# Patient Record
Sex: FEMALE | Race: BLACK OR AFRICAN AMERICAN | NOT HISPANIC OR LATINO | Employment: UNEMPLOYED | URBAN - METROPOLITAN AREA
[De-identification: names, ages, dates, MRNs, and addresses within clinical notes are randomized per-mention and may not be internally consistent; named-entity substitution may affect disease eponyms.]

---

## 2018-07-17 ENCOUNTER — HISTORICAL (OUTPATIENT)
Dept: INTERNAL MEDICINE | Facility: CLINIC | Age: 63
End: 2018-07-17

## 2018-07-17 LAB
ABS NEUT (OLG): 1.2 X10(3)/MCL (ref 2.1–9.2)
ALBUMIN SERPL-MCNC: 2.3 GM/DL (ref 3.4–5)
ALBUMIN/GLOB SERPL: 0 RATIO (ref 1–2)
ALP SERPL-CCNC: 233 UNIT/L (ref 45–117)
ALT SERPL-CCNC: 61 UNIT/L (ref 12–78)
APPEARANCE, UA: ABNORMAL
AST SERPL-CCNC: 78 UNIT/L (ref 15–37)
BACTERIA #/AREA URNS AUTO: ABNORMAL /[HPF]
BASOPHILS # BLD AUTO: 0.03 X10(3)/MCL
BASOPHILS NFR BLD AUTO: 1 %
BILIRUB SERPL-MCNC: 1.2 MG/DL (ref 0.2–1)
BILIRUB UR QL STRIP: 0.5 MG/DL
BILIRUBIN DIRECT+TOT PNL SERPL-MCNC: 0.6 MG/DL
BILIRUBIN DIRECT+TOT PNL SERPL-MCNC: 0.6 MG/DL
BUN SERPL-MCNC: 10 MG/DL (ref 7–18)
BUN SERPL-MCNC: 10 MG/DL (ref 7–18)
CALCIUM SERPL-MCNC: 7.8 MG/DL (ref 8.5–10.1)
CALCIUM SERPL-MCNC: 7.8 MG/DL (ref 8.5–10.1)
CHLORIDE SERPL-SCNC: 109 MMOL/L (ref 98–107)
CHLORIDE SERPL-SCNC: 109 MMOL/L (ref 98–107)
CHOLEST SERPL-MCNC: 122 MG/DL
CHOLEST/HDLC SERPL: 7.2 {RATIO} (ref 0–4.4)
CO2 SERPL-SCNC: 25 MMOL/L (ref 21–32)
CO2 SERPL-SCNC: 25 MMOL/L (ref 21–32)
COLOR UR: ABNORMAL
CREAT SERPL-MCNC: 0.7 MG/DL (ref 0.6–1.3)
CREAT SERPL-MCNC: 0.7 MG/DL (ref 0.6–1.3)
CREAT UR-MCNC: 286 MG/DL
CREAT/UREA NIT SERPL: 14
EOSINOPHIL # BLD AUTO: 0.17 10*3/UL
EOSINOPHIL NFR BLD AUTO: 6 %
ERYTHROCYTE [DISTWIDTH] IN BLOOD BY AUTOMATED COUNT: 18.3 % (ref 11.5–14.5)
EST. AVERAGE GLUCOSE BLD GHB EST-MCNC: 74 MG/DL
GLOBULIN SER-MCNC: 5.2 GM/ML (ref 2.3–3.5)
GLUCOSE (UA): NORMAL
GLUCOSE SERPL-MCNC: 82 MG/DL (ref 74–106)
GLUCOSE SERPL-MCNC: 82 MG/DL (ref 74–106)
HAV IGM SERPL QL IA: NONREACTIVE
HBA1C MFR BLD: 4.2 % (ref 4.2–6.3)
HBV CORE IGM SERPL QL IA: NONREACTIVE
HBV SURFACE AG SERPL QL IA: NEGATIVE
HCT VFR BLD AUTO: 33.4 % (ref 35–46)
HCV AB SERPL QL IA: REACTIVE
HDLC SERPL-MCNC: 17 MG/DL
HGB BLD-MCNC: 11.4 GM/DL (ref 12–16)
HGB UR QL STRIP: ABNORMAL MG/DL
HYALINE CASTS #/AREA URNS LPF: ABNORMAL /[LPF]
IMM GRANULOCYTES # BLD AUTO: 0.01 10*3/UL
IMM GRANULOCYTES NFR BLD AUTO: 0 %
KETONES UR QL STRIP: ABNORMAL
LDLC SERPL CALC-MCNC: 86 MG/DL (ref 0–130)
LEUKOCYTE ESTERASE UR QL STRIP: 25 LEU/UL
LYMPHOCYTES # BLD AUTO: 1.23 X10(3)/MCL
LYMPHOCYTES NFR BLD AUTO: 42 % (ref 13–40)
MCH RBC QN AUTO: 30.7 PG (ref 26–34)
MCHC RBC AUTO-ENTMCNC: 34.1 GM/DL (ref 31–37)
MCV RBC AUTO: 90 FL (ref 80–100)
MICROALBUMIN UR-MCNC: 83 MG/L (ref 0–19)
MICROALBUMIN/CREAT RATIO PNL UR: 29 MCG/MG CR (ref 0–29)
MONOCYTES # BLD AUTO: 0.3 X10(3)/MCL
MONOCYTES NFR BLD AUTO: 10 % (ref 4–12)
MUCOUS THREADS URNS QL MICRO: SLIGHT
NEUTROPHILS # BLD AUTO: 1.2 X10(3)/MCL
NEUTROPHILS NFR BLD AUTO: 41 X10(3)/MCL
NITRITE UR QL STRIP: NEGATIVE
PH UR STRIP: 7 [PH] (ref 4.5–8)
PLATELET # BLD AUTO: 47 X10(3)/MCL (ref 130–400)
PMV BLD AUTO: 10.7 FL (ref 7.4–10.4)
POTASSIUM SERPL-SCNC: 4 MMOL/L (ref 3.5–5.1)
POTASSIUM SERPL-SCNC: 4 MMOL/L (ref 3.5–5.1)
PROT SERPL-MCNC: 7.5 GM/DL (ref 6.4–8.2)
PROT UR QL STRIP: 70 MG/DL
RBC # BLD AUTO: 3.71 X10(6)/MCL (ref 4–5.2)
RBC #/AREA URNS AUTO: >=100 /[HPF]
SODIUM SERPL-SCNC: 141 MMOL/L (ref 136–145)
SODIUM SERPL-SCNC: 141 MMOL/L (ref 136–145)
SP GR UR STRIP: 1.02 (ref 1–1.03)
SQUAMOUS #/AREA URNS LPF: ABNORMAL /[LPF]
T4 FREE SERPL-MCNC: 0.9 NG/DL (ref 0.76–1.46)
TRIGL SERPL-MCNC: 94 MG/DL
TSH SERPL-ACNC: 5.84 MIU/L (ref 0.36–3.74)
UROBILINOGEN UR STRIP-ACNC: 12 MG/DL
VLDLC SERPL CALC-MCNC: 19 MG/DL
WBC # SPEC AUTO: 2.9 X10(3)/MCL (ref 4.5–11)
WBC #/AREA URNS AUTO: ABNORMAL /HPF

## 2018-07-26 ENCOUNTER — HISTORICAL (OUTPATIENT)
Dept: ADMINISTRATIVE | Facility: HOSPITAL | Age: 63
End: 2018-07-26

## 2018-07-26 LAB
APTT PPP: 34 SECOND(S) (ref 23.3–37)
FERRITIN SERPL-MCNC: 468.4 NG/ML (ref 8–388)
GGT SERPL-CCNC: 127 UNIT/L (ref 5–85)
INR PPP: 1.38 (ref 0.9–1.2)
LDH SERPL-CCNC: 236 UNIT/L (ref 84–246)
PROT SERPL-MCNC: 7.7 GM/DL
PROTHROMBIN TIME: 16.1 SECOND(S) (ref 11.9–14.4)

## 2018-08-06 ENCOUNTER — HISTORICAL (OUTPATIENT)
Dept: RADIOLOGY | Facility: HOSPITAL | Age: 63
End: 2018-08-06

## 2018-09-26 ENCOUNTER — HISTORICAL (OUTPATIENT)
Dept: ADMINISTRATIVE | Facility: HOSPITAL | Age: 63
End: 2018-09-26

## 2018-10-15 ENCOUNTER — HISTORICAL (OUTPATIENT)
Dept: LAB | Facility: HOSPITAL | Age: 63
End: 2018-10-15

## 2018-10-25 ENCOUNTER — HISTORICAL (OUTPATIENT)
Dept: LAB | Facility: HOSPITAL | Age: 63
End: 2018-10-25

## 2019-02-11 ENCOUNTER — HISTORICAL (OUTPATIENT)
Dept: RADIOLOGY | Facility: HOSPITAL | Age: 64
End: 2019-02-11

## 2019-03-06 ENCOUNTER — HISTORICAL (OUTPATIENT)
Dept: RADIOLOGY | Facility: HOSPITAL | Age: 64
End: 2019-03-06

## 2019-03-21 ENCOUNTER — HISTORICAL (OUTPATIENT)
Dept: RADIOLOGY | Facility: HOSPITAL | Age: 64
End: 2019-03-21

## 2019-03-26 ENCOUNTER — TELEPHONE (OUTPATIENT)
Dept: TRANSPLANT | Facility: CLINIC | Age: 64
End: 2019-03-26

## 2019-03-26 NOTE — TELEPHONE ENCOUNTER
----- Message from Kahlil Cassidy sent at 3/26/2019  5:53 PM CDT -----    We have the pt records and they are now pending review by the referral nurse.  By:Kahlil Cassidy

## 2019-04-01 ENCOUNTER — DOCUMENTATION ONLY (OUTPATIENT)
Dept: TRANSPLANT | Facility: CLINIC | Age: 64
End: 2019-04-01

## 2019-04-01 NOTE — NURSING
Pt records reviewed.  Pt will be referred to [facility] due to HEP C. Pt with MELD of  11. Too early for Txp. Will see HCV clinic for treatment recommendations.   Initial referral received from Ivett Lara NP.   Referral letter sent to provider and patient.

## 2019-04-01 NOTE — LETTER
April 1, 2019    Paula Vilchis  [address] [address]      Dear Paula Vilchis:    Your doctor has referred you to the [facility]. We are sending this letter to remind you to make an appointment with us to complete the referral process.     Please call us at [phone] to schedule an appointment. We look forward to seeing you soon.     If you received a call and have been scheduled, please disregard this letter.       Sincerely,        [facility]   [address] [place] [address]  [phone]

## 2019-04-01 NOTE — LETTER
April 1, 2019    Paula Vilchis  [address] [facility] [address]      Dear Paula Vilchis:    Your doctor has referred you to the [facility]. We are sending this letter to remind you to make an appointment with us to complete the referral process.     Please call us at [phone] to schedule an appointment. We look forward to seeing you soon.     If you received a call and have been scheduled, please disregard this letter.       Sincerely,        [facility]   [address] [place] [id number]  [phone]

## 2019-04-01 NOTE — LETTER
April 1, 2019    Ivett Lara, JOVANA  [address] [address]      Dear Dr. Lara    Patient: Paula Vilchis   MR Number: [id number]   YOB: 1955     Thank you for the referral of Paula Vilchis to the [facility] program. An initial appointment will be scheduled for your patient with one of our Hepatologists.      Thank you again for your trust in our program.  If there is anything we can do for you or your staff, please feel free to contact us.        Sincerely,        [facility]  [address] [place] [id number]  [phone]

## 2019-04-03 ENCOUNTER — TELEPHONE (OUTPATIENT)
Dept: HEPATOLOGY | Facility: CLINIC | Age: 64
End: 2019-04-03

## 2019-07-30 ENCOUNTER — TELEPHONE (OUTPATIENT)
Dept: HEPATOLOGY | Facility: CLINIC | Age: 64
End: 2019-07-30

## 2019-07-30 NOTE — TELEPHONE ENCOUNTER
----- Message from Kahlil Cassidy sent at 7/26/2019  2:43 PM CDT -----    Please call pt to [facility] 1st appt.

## 2019-11-25 ENCOUNTER — HISTORICAL (OUTPATIENT)
Dept: RADIOLOGY | Facility: HOSPITAL | Age: 64
End: 2019-11-25

## 2020-02-07 ENCOUNTER — HISTORICAL (OUTPATIENT)
Dept: INTERNAL MEDICINE | Facility: CLINIC | Age: 65
End: 2020-02-07

## 2020-02-17 ENCOUNTER — HISTORICAL (OUTPATIENT)
Dept: RADIOLOGY | Facility: HOSPITAL | Age: 65
End: 2020-02-17

## 2020-04-15 ENCOUNTER — HISTORICAL (OUTPATIENT)
Dept: LAB | Facility: HOSPITAL | Age: 65
End: 2020-04-15

## 2020-05-12 ENCOUNTER — HISTORICAL (OUTPATIENT)
Dept: RADIOLOGY | Facility: HOSPITAL | Age: 65
End: 2020-05-12

## 2020-05-21 ENCOUNTER — HISTORICAL (OUTPATIENT)
Dept: INTERNAL MEDICINE | Facility: CLINIC | Age: 65
End: 2020-05-21

## 2020-06-18 ENCOUNTER — HISTORICAL (OUTPATIENT)
Dept: LAB | Facility: HOSPITAL | Age: 65
End: 2020-06-18

## 2020-07-28 ENCOUNTER — HISTORICAL (OUTPATIENT)
Dept: LAB | Facility: HOSPITAL | Age: 65
End: 2020-07-28

## 2020-08-24 ENCOUNTER — HISTORICAL (OUTPATIENT)
Dept: LAB | Facility: HOSPITAL | Age: 65
End: 2020-08-24

## 2020-08-31 ENCOUNTER — HISTORICAL (OUTPATIENT)
Dept: LAB | Facility: HOSPITAL | Age: 65
End: 2020-08-31

## 2020-10-16 ENCOUNTER — HISTORICAL (OUTPATIENT)
Dept: GASTROENTEROLOGY | Facility: CLINIC | Age: 65
End: 2020-10-16

## 2020-12-22 ENCOUNTER — HISTORICAL (OUTPATIENT)
Dept: LAB | Facility: HOSPITAL | Age: 65
End: 2020-12-22

## 2021-02-26 ENCOUNTER — HISTORICAL (OUTPATIENT)
Dept: RADIOLOGY | Facility: HOSPITAL | Age: 66
End: 2021-02-26

## 2021-04-05 LAB — HEMOCCULT STL QL IA: NEGATIVE

## 2021-09-14 ENCOUNTER — HISTORICAL (OUTPATIENT)
Dept: LAB | Facility: HOSPITAL | Age: 66
End: 2021-09-14

## 2021-09-16 ENCOUNTER — HISTORICAL (OUTPATIENT)
Dept: RADIOLOGY | Facility: HOSPITAL | Age: 66
End: 2021-09-16

## 2021-11-08 ENCOUNTER — HISTORICAL (OUTPATIENT)
Dept: RADIOLOGY | Facility: HOSPITAL | Age: 66
End: 2021-11-08

## 2021-11-10 ENCOUNTER — HISTORICAL (OUTPATIENT)
Dept: RADIOLOGY | Facility: HOSPITAL | Age: 66
End: 2021-11-10

## 2022-01-03 ENCOUNTER — HISTORICAL (OUTPATIENT)
Dept: GASTROENTEROLOGY | Facility: CLINIC | Age: 67
End: 2022-01-03

## 2022-01-05 ENCOUNTER — HISTORICAL (OUTPATIENT)
Dept: ENDOSCOPY | Facility: HOSPITAL | Age: 67
End: 2022-01-05

## 2022-01-28 ENCOUNTER — HISTORICAL (OUTPATIENT)
Dept: LAB | Facility: HOSPITAL | Age: 67
End: 2022-01-28

## 2022-01-28 LAB
ABS NEUT (OLG): 1.1 (ref 1.5–6.9)
ALBUMIN SERPL-MCNC: 3.3 G/DL (ref 3.4–4.8)
ALBUMIN/GLOB SERPL: 0.7 {RATIO} (ref 1.1–2)
ALP SERPL-CCNC: 154 U/L (ref 40–150)
ALT SERPL-CCNC: 30 U/L (ref 0–55)
AST SERPL-CCNC: 58 U/L (ref 5–34)
BASOPHILS NFR BLD MANUAL: 0 % (ref 0–1)
BILIRUB SERPL-MCNC: 1.3 MG/DL
BILIRUBIN DIRECT+TOT PNL SERPL-MCNC: 0.6 (ref 0–0.5)
BILIRUBIN DIRECT+TOT PNL SERPL-MCNC: 0.7 (ref 0–0.8)
BUN SERPL-MCNC: 9 MG/DL (ref 9.8–20.1)
CALCIUM SERPL-MCNC: 9.7 MG/DL (ref 8.7–10.5)
CHLORIDE SERPL-SCNC: 105 MMOL/L (ref 98–107)
CHOLEST SERPL-MCNC: 135 MG/DL
CHOLEST/HDLC SERPL: 4 {RATIO} (ref 0–5)
CO2 SERPL-SCNC: 26 MMOL/L (ref 23–31)
CREAT SERPL-MCNC: 0.71 MG/DL (ref 0.55–1.02)
EOSINOPHIL NFR BLD MANUAL: 7 % (ref 0–5)
ERYTHROCYTE [DISTWIDTH] IN BLOOD BY AUTOMATED COUNT: 14.7 % (ref 11.5–17)
GLOBULIN SER-MCNC: 5 G/DL (ref 2.4–3.5)
GLUCOSE SERPL-MCNC: 81 MG/DL (ref 82–115)
GRANULOCYTES NFR BLD MANUAL: 32 % (ref 47–80)
HCT VFR BLD AUTO: 38.8 % (ref 36–48)
HDLC SERPL-MCNC: 38 MG/DL (ref 35–60)
HEMOLYSIS INTERF INDEX SERPL-ACNC: 2
HGB BLD-MCNC: 12.4 G/DL (ref 12–16)
ICTERIC INTERF INDEX SERPL-ACNC: 1
LDLC SERPL CALC-MCNC: 82 MG/DL (ref 50–140)
LIPEMIC INTERF INDEX SERPL-ACNC: <0
LYMPHOCYTES NFR BLD MANUAL: 57 % (ref 15–40)
MANUAL DIFF? (OHS): NO
MCH RBC QN AUTO: 27 PG (ref 27–34)
MCHC RBC AUTO-ENTMCNC: 32 G/DL (ref 31–36)
MCV RBC AUTO: 86 FL (ref 80–99)
MONOCYTES NFR BLD MANUAL: 3 % (ref 4–12)
NEUTS BAND NFR BLD MANUAL: 1 % (ref 1–5)
PLATELET # BLD AUTO: 130 10*3/UL (ref 140–400)
PLATELET # BLD EST: NORMAL 10*3/UL
PMV BLD AUTO: 10.5 FL (ref 6.8–10)
POTASSIUM SERPL-SCNC: 4.3 MMOL/L (ref 3.5–5.1)
PROT SERPL-MCNC: 8.3 G/DL (ref 5.8–7.6)
RBC # BLD AUTO: 4.52 10*6/UL (ref 4.2–5.4)
SODIUM SERPL-SCNC: 139 MMOL/L (ref 136–145)
TRIGL SERPL-MCNC: 75 MG/DL (ref 37–140)
TSH SERPL-ACNC: 2.26 M[IU]/L (ref 0.35–4.94)
VLDLC SERPL CALC-MCNC: 15 MG/DL
WBC # SPEC AUTO: 4 10*3/UL (ref 4.5–11.5)

## 2022-01-29 LAB — DEPRECATED CALCIDIOL+CALCIFEROL SERPL-MC: 55.1 NG/ML (ref 30–80)

## 2022-02-28 ENCOUNTER — HISTORICAL (OUTPATIENT)
Dept: RADIOLOGY | Facility: HOSPITAL | Age: 67
End: 2022-02-28

## 2022-02-28 ENCOUNTER — HISTORICAL (OUTPATIENT)
Dept: ADMINISTRATIVE | Facility: HOSPITAL | Age: 67
End: 2022-02-28

## 2022-03-14 ENCOUNTER — HISTORICAL (OUTPATIENT)
Dept: ADMINISTRATIVE | Facility: HOSPITAL | Age: 67
End: 2022-03-14

## 2022-04-07 ENCOUNTER — HISTORICAL (OUTPATIENT)
Dept: ADMINISTRATIVE | Facility: HOSPITAL | Age: 67
End: 2022-04-07
Payer: MEDICAID

## 2022-04-23 VITALS
SYSTOLIC BLOOD PRESSURE: 138 MMHG | BODY MASS INDEX: 25.88 KG/M2 | HEIGHT: 62 IN | WEIGHT: 140.63 LBS | DIASTOLIC BLOOD PRESSURE: 83 MMHG | OXYGEN SATURATION: 98 %

## 2022-05-02 NOTE — HISTORICAL [facility] CERNER
This is a historical note converted from [employer]. Formatting and pictures may have been removed.  Please reference [employer] for original formatting and attached multimedia. Chief Complaint  RESUME CARE,HX OF HTN,DEPRESSION.SWELLING TO LEFT CHEST AREA X 3 DAYS .INSOMNIA.NEUTROPENIA  History of Present Illness  62 year old AAF, here to re-establish PCP in [facility]. PMH HTN, pedal edema, tobacco user, HEP C, depression. Stopped taking?HCTZ x5 days due to recall letter from Insurance company. c/o sharp pains to Rt upper arm and Rt leg?x 4 days, states red rash to chest wall with left chest wall nodule noted 3-4 days ago.  Continued neutropenia, peripheral smear shows pancytopenia, RBC morphology WNL. Plt 47; hep c +. Pt denies easy bruising, bleeding,? epistaxis, gum bleeding, GI/ bleeding, bone pain, night sweats, N/V, fatigue, weakness, SOB abd pain, fever, icterus. Pt reports hx blood transfusion ~ 20-30 years ago, unsure of reason. Also reports recent death of spouse and father close together ~ 4 mo ago and is drinking 2- 16 oz wine?every other day d/t depression. Was started on fluoxetine 10mg qd at last [facility] OV with minimal relief. Denies HI/SI. Denies fever, chills, night sweats, N/V, HA, blurry vision, dizziness, CP, SOB, or any other problems or concerns this visit.  Review of Systems  Constitutional: negative except as stated in HPI  Eye: negative except as stated in HPI  ENMT: negative except as stated in HPI  Respiratory: negative except as stated in HPI  Cardiovascular: negative except as stated in HPI  Gastrointestinal: negative except as stated in HPI  Genitourinary: negative except as stated in HPI  Hema/Lymph: negative except as stated in HPI  Endocrine: negative except as stated in HPI  Immunologic: negative except as stated in HPI  Musculoskeletal: negative except as stated in HPI  Integumentary: negative except as stated in HPI  Neurologic: negative except as stated in HPI  ?   All Other ROS_ ?negative  except as stated in HPI  Physical Exam  Vitals & Measurements  T:?36.6? ?C (Oral)? HR:?66(Peripheral)? RR:?16? BP:?156/78?  HT:?158?cm? HT:?158?cm? WT:?68.9?kg? WT:?68.9?kg? BMI:?27.6?  General: Alert and oriented, No acute distress.  Eye: Pupils are equal, round and reactive to light, Extraocular movements are intact, Normal conjunctiva.  HENT: Normocephalic, Normal hearing, Oral mucosa is moist, No pharyngeal erythema.  Neck: Supple, Non-tender, No lymphadenopathy, No thyromegaly.  Respiratory: Lungs are clear to auscultation, Respirations are non-labored, Breath sounds are equal, Symmetrical chest wall expansion.  Cardiovascular: Normal rate, Regular rhythm, No murmur, Normal peripheral perfusion, No edema.  Gastrointestinal: Soft, Non-tender, Non-distended, Normal bowel sounds, No organomegaly.  Genitourinary: No costovertebral angle tenderness, No inguinal tenderness.  Lymphatics: No lymphadenopathy neck, axilla, groin.  Musculoskeletal: Normal range of motion, Normal strength, No tenderness, Normal gait.  Neurologic: No focal deficits, Cranial Nerves II-XII are grossly intact.  Integumentary: Warm, Dry, Intact.Nonblanching purple/red rash to chest wall, with nontender palpable 1cm oval area to left chest wall  Feet: 2+ pedal pulses bilaterally.  ?  Assessment/Plan  Breast cancer screening  ?MG  Ordered:  Clinic Follow up, *Est. 03/26/19 3:00:00 CDT, Order for future visit, Hepatitis C  Cirrhosis  HTN - Hypertension  Situational depression  Pancytopenia  Elevated TSH  Tobacco user  Colon cancer screening  Breast cancer screening  Wellness examination, [facility]...  MG Screening, Routine, *Est. 10/26/18 3:00:00 CDT, Screening, None, Ambulatory, Rad Type, Order for future visit, Breast cancer screening, Schedule this test, [facility], *Est. 10/26/18 3:00:00 CDT  Office/Outpatient Visit Level 4 Established [id number] PC, HTN - Hypertension  Situational depression  Cirrhosis   Pancytopenia  Elevated TSH  Hepatitis C  Tobacco user  Colon cancer screening  Breast cancer screening  Wellness examination, [facility], 09/26/18 13:46:00 CDT  ?  Cirrhosis  ?US reviewed with pt  Ordered:  Clinic Follow up, *Est. 03/26/19 3:00:00 CDT, Order for future visit, Hepatitis C  Cirrhosis  HTN - Hypertension  Situational depression  Pancytopenia  Elevated TSH  Tobacco user  Colon cancer screening  Breast cancer screening  Wellness examination, [facility]...  Office/Outpatient Visit Level 4 Established [id number] PC, HTN - Hypertension  Situational depression  Cirrhosis  Pancytopenia  Elevated TSH  Hepatitis C  Tobacco user  Colon cancer screening  Breast cancer screening  Wellness examination, [facility], 09/26/18 13:46:00 CDT  ?  Colon cancer screening  FIT  Ordered:  Clinic Follow up, *Est. 03/26/19 3:00:00 CDT, Order for future visit, Hepatitis C  Cirrhosis  HTN - Hypertension  Situational depression  Pancytopenia  Elevated TSH  Tobacco user  Colon cancer screening  Breast cancer screening  Wellness examination, [facility]...  Occult Blood Fecal Immunoassay, Routine collect, Stool, Order for future visit, *Est. 09/26/18 3:00:00 CDT, *Est. Stop date 09/26/18 3:00:00 CDT, Nurse collect, Colon cancer screening  Office/Outpatient Visit Level 4 Established [id number] PC, HTN - Hypertension  Situational depression  Cirrhosis  Pancytopenia  Elevated TSH  Hepatitis C  Tobacco user  Colon cancer screening  Breast cancer screening  Wellness examination, [facility], 09/26/18 13:46:00 CDT  ?  Elevated TSH  ?TSH 5.8 in 7/2018, will monitor, repeat level.  Ordered:  Clinic Follow up, *Est. 03/26/19 3:00:00 CDT, Order for future visit, Hepatitis C  Cirrhosis  HTN - Hypertension  Situational depression  Pancytopenia  Elevated TSH  Tobacco user  Colon cancer screening  Breast cancer screening  Wellness examination, [facility]...  Office/Outpatient Visit Level 4 Established  [id number] PC, HTN - Hypertension  Situational depression  Cirrhosis  Pancytopenia  Elevated TSH  Hepatitis C  Tobacco user  Colon cancer screening  Breast cancer screening  Wellness examination, [facility], 09/26/18 13:46:00 CDT  Thyroid Stimulating Hormone, Routine collect, *Est. 09/26/18 3:00:00 CDT, Blood, Order for future visit, *Est. Stop date 09/26/18 3:00:00 CDT, Lab Collect, Elevated TSH, 09/26/18 13:46:00 CDT  ?  Hepatitis C  ?Referral form for ID completed today, Order was in already by Dr. Jones.  Ordered:  CBC w/ Auto Diff, Routine collect, *Est. 09/26/18 3:00:00 CDT, Blood, Order for future visit, *Est. Stop date 09/26/18 3:00:00 CDT, Lab Collect, Pancytopenia  Hepatitis C, 09/26/18 13:46:00 CDT  Clinic Follow up, *Est. 03/26/19 3:00:00 CDT, Order for future visit, Hepatitis C  Cirrhosis  HTN - Hypertension  Situational depression  Pancytopenia  Elevated TSH  Tobacco user  Colon cancer screening  Breast cancer screening  Wellness examination, [facility] Cl...  Comprehensive Metabolic Panel, Routine collect, *Est. 09/26/18 3:00:00 CDT, Blood, Order for future visit, *Est. Stop date 09/26/18 3:00:00 CDT, Lab Collect, Pancytopenia  Hepatitis C, 09/26/18 13:46:00 CDT  Office/Outpatient Visit Level 4 Established 22456 PC, HTN - Hypertension  Situational depression  Cirrhosis  Pancytopenia  Elevated TSH  Hepatitis C  Tobacco user  Colon cancer screening  Breast cancer screening  Wellness examination, [facility], 09/26/18 13:46:00 CDT  ?  HTN - Hypertension  ?elevated today, not taking HCTZ for 5 days, advised to resume med. DASH diet: Eat more fruits, vegetables, and low fat dairy foods. Low sodium diet.  Ordered:  Clinic Follow up, *Est. 03/26/19 3:00:00 CDT, Order for future visit, Hepatitis C  Cirrhosis  HTN - Hypertension  Situational depression  Pancytopenia  Elevated TSH  Tobacco user  Colon cancer screening  Breast cancer screening  Wellness examination, [facility]  Cl...  Office/Outpatient Visit Level 4 Established [id number] PC, HTN - Hypertension  Situational depression  Cirrhosis  Pancytopenia  Elevated TSH  Hepatitis C  Tobacco user  Colon cancer screening  Breast cancer screening  Wellness examination, [facility], 09/26/18 13:46:00 CDT  Urinalysis with Microscopic if Indicated, Routine collect, Urine, Order for future visit, *Est. 09/26/18 3:00:00 CDT, *Est. Stop date 09/26/18 3:00:00 CDT, Nurse collect, HTN - Hypertension, 09/26/18 13:46:00 CDT  ?  Leg edema  ?HCTZ continued  Ordered:  hydrochlorothiazide, 12.5 mg = 1 tab(s), Oral, Daily, # 30 tab(s), 6 Refill(s), Pharmacy: [facility] [id number]  ?  Pancytopenia  ?CBC reordered. Referral to Hematology  Ordered:  CBC w/ Auto Diff, Routine collect, *Est. 09/26/18 3:00:00 CDT, Blood, Order for future visit, *Est. Stop date 09/26/18 3:00:00 CDT, Lab Collect, Pancytopenia  Hepatitis C, 09/26/18 13:46:00 CDT  Clinic Follow up, *Est. 03/26/19 3:00:00 CDT, Order for future visit, Hepatitis C  Cirrhosis  HTN - Hypertension  Situational depression  Pancytopenia  Elevated TSH  Tobacco user  Colon cancer screening  Breast cancer screening  Wellness examination, [facility] Cl...  Comprehensive Metabolic Panel, Routine collect, *Est. 09/26/18 3:00:00 CDT, Blood, Order for future visit, *Est. Stop date 09/26/18 3:00:00 CDT, Lab Collect, Pancytopenia  Hepatitis C, 09/26/18 13:46:00 CDT  Office/Outpatient Visit Level 4 Established [id number] PC, HTN - Hypertension  Situational depression  Cirrhosis  Pancytopenia  Elevated TSH  Hepatitis C  Tobacco user  Colon cancer screening  Breast cancer screening  Wellness examination, [facility], 09/26/18 13:46:00 CDT  ?  Single skin nodule  ?CXR, CBC, CMP  ?  Situational depression  ?increase fluoxetine to 20mg qd  Ordered:  Clinic Follow up, *Est. 03/26/19 3:00:00 CDT, Order for future visit, Hepatitis C  Cirrhosis  HTN - Hypertension  Situational depression   Pancytopenia  Elevated TSH  Tobacco user  Colon cancer screening  Breast cancer screening  Wellness examination, [facility] Cl...  Office/Outpatient Visit Level 4 Established [id number] PC, HTN - Hypertension  Situational depression  Cirrhosis  Pancytopenia  Elevated TSH  Hepatitis C  Tobacco user  Colon cancer screening  Breast cancer screening  Wellness examination, [facility], 09/26/18 13:46:00 CDT  ?  Tobacco user  ?smoking cessation advised  Ordered:  Clinic Follow up, *Est. 03/26/19 3:00:00 CDT, Order for future visit, Hepatitis C  Cirrhosis  HTN - Hypertension  Situational depression  Pancytopenia  Elevated TSH  Tobacco user  Colon cancer screening  Breast cancer screening  Wellness examination, [facility]...  Office/Outpatient Visit Level 4 Established 76554 PC, HTN - Hypertension  Situational depression  Cirrhosis  Pancytopenia  Elevated TSH  Hepatitis C  Tobacco user  Colon cancer screening  Breast cancer screening  Wellness examination, [facility], 09/26/18 13:46:00 CDT  ?  Wellness examination  ?labs today and will notify, GYN, FIT?and MG  Ordered:  Clinic Follow up, *Est. 03/26/19 3:00:00 CDT, Order for future visit, Hepatitis C  Cirrhosis  HTN - Hypertension  Situational depression  Pancytopenia  Elevated TSH  Tobacco user  Colon cancer screening  Breast cancer screening  Wellness examination, [facility]...  Internal Referral to Gynecology, wellness, *Est. 10/26/18 3:00:00 CDT, Future Visit?, Wellness examination  Office/Outpatient Visit Level 4 Established 06602 PC, HTN - Hypertension  Situational depression  Cirrhosis  Pancytopenia  Elevated TSH  Hepatitis C  Tobacco user  Colon cancer screening  Breast cancer screening  Wellness examination, [facility], 09/26/18 13:46:00 CDT  ?  Orders:  FLUoxetine, 20 mg = 1 cap(s), Oral, Daily, # 30 cap(s), 6 Refill(s), Pharmacy: [facility] [id number]  XR Chest 2 Views, Routine, *Est. 09/26/18 3:00:00 CDT, Other  (please specify), Left chest wall nodule, None, Ambulatory, Rad Type, Order for future visit, Nodule of chest wall, Not Scheduled, *Est. 18 3:00:00 CDT  RTC prn and NA (6 months)   Problem List/Past Medical History  Ongoing  Abscess of foot  Hepatitis C  HTN - Hypertension  Neutropenia  Situational depression  Thrombocytopenia  Tobacco user  Historical  No qualifying data  Procedure/Surgical History  Drainage of Right Foot Skin, External Approach (2017)  Incision and drainage of abscess (eg, carbuncle, suppurative hidradenitis, cutaneous or subcutaneous abscess, cyst, furuncle, or paronychia); complicated or multiple (2017)  Drainage of Right Foot Skin, External Approach (2017)  Incision and drainage of hematoma, seroma or fluid collection (2017)   section   section   section   section  hernia repair   Medications  hydrochlorothiazide 12.5 mg oral tablet, 12.5 mg= 1 tab(s), Oral, Daily, 3 refills  Prozac 10 mg oral capsule, 10 mg= 1 cap(s), Oral, Daily, 2 refills  Allergies  No Known Medication Allergies  Social History  Alcohol  Current, Wine, 1-2 times per week, 2017  Home/Environment  Lives with son. Living situation: Home/Independent. Alcohol abuse in household: No. Substance abuse in household: No. Smoker in household: Yes. Injuries/Abuse/Neglect in household: No. Feels unsafe at home: No. Safe place to go: Yes. Agency(s)/Others notified: No. Family/Friends available for support: Yes. Concern for family members at home: No. Major illness in household: No. Financial concerns: Yes. TV/Computer concerns: No., 2018  Nutrition/Health  Regular, 2017  Substance Abuse  Never, 2017  Tobacco  Current every day smoker Use:. Cigars Type:. 3 per day. Started age 5 Years., 2018  Family History  Diabetes mellitus type 2: Father.  Hypertension.: Mother and Father.  Stroke: Father.  Health Maintenance  Health Maintenance  ???Pending?(in  the next year)  ??? ??OverDue  ??? ? ? ?Diabetes Screening due??and every?  ??? ??Due?  ??? ? ? ?ADL Screening due??09/26/18??and every 1??year(s)  ??? ? ? ?Alcohol Misuse Screening due??09/26/18??and every 1??year(s)  ??? ? ? ?Aspirin Therapy for CVD Prevention due??09/26/18??and every 1??year(s)  ??? ? ? ?Breast Cancer Screening due??09/26/18??and every?  ??? ? ? ?Cervical Cancer Screening due??09/26/18??and every?  ??? ? ? ?Colorectal Screening due??09/26/18??and every?  ??? ? ? ?Hypertension Management-Education due??09/26/18??and every 1??year(s)  ??? ? ? ?Influenza Vaccine due??09/26/18??and every?  ??? ? ? ?Lung Cancer Screening due??09/26/18??and every 1??year(s)  ??? ? ? ?Tetanus Vaccine due??09/26/18??and every 10??year(s)  ??? ? ? ?Zoster Vaccine due??09/26/18??and every 100??year(s)  ??? ??Due In Future?  ??? ? ? ?Hypertension Management-BMP not due until??07/17/19??and every 1??year(s)  ???Satisfied?(in the past 1 year)  ??? ??Satisfied?  ??? ? ? ?Blood Pressure Screening on??09/26/18.??Satisfied by Pauly JONESN, Cheryl Raiza  ??? ? ? ?Body Mass Index Check on??09/26/18.??Satisfied by Pauly LPN, Cheryl Raiza  ??? ? ? ?Depression Screening on??09/26/18.??Satisfied by Pauly LPN, Cheryl Raiza  ??? ? ? ?Diabetes Screening on??07/17/18.??Satisfied by Teresa Horner NP  ??? ? ? ?Hypertension Management-Blood Pressure on??09/26/18.??Satisfied by Cheryl Coats LPN  ??? ? ? ?Influenza Vaccine on??09/26/18.??Satisfied by Cheryl Coats LPN  ??? ? ? ?Lipid Screening on??07/17/18.??Satisfied by Lidia Titus  ??? ? ? ?Obesity Screening on??09/26/18.??Satisfied by Cheryl Coats LPN  ??? ? ? ?Smoking Cessation on??09/26/18.??Satisfied by Cheryl Coats LPN  ?  ?

## 2022-05-02 NOTE — HISTORICAL [facility] CERNER
This is a historical note converted from [place]. Formatting and pictures may have been removed.  Please reference [place] for original formatting and attached multimedia. Chief Complaint  follow up (recent ED visit)  History of Present Illness  63 yo AA female presents today for ER f/up. Pt seen and tx in ER for right heel abscess. States wound was previously drained by [facility]. States is currently taking clindamycin and reports some improvement; swelling, pain reduced; no drainage. Pt states she thinks she was bitten by something. ER labs revealed neutropenia. HIV negative. Pt denies fever, chills, night sweats; Pt avoiding subject; does not want to talk about lab results.  ?   07/26/18 - Pt presents today for routine f/up appt. NS last 2 appts. At last visit labs were discussed but pt refused to listen. Reviewed labs today. Continued neutropenia, peripheral smear shows pancytopenia, RBC morphology WNL. Plt 47; hep c +. Pt denies easy bruising, bleeding, petechiae,? epitaxis, gum bleeding, GI/ bleeding, bone pain, night sweats, N/V, fatigue, weakness, SOB abd pain, fever, icterus. Pt reports hx blood transfusion ~ 20-30 years ago, unsure of reason. Also reports recent death of spouse and father close together ~ 4 mo ago and is drinking 2- 16 oz wine?every other day d/t depression. Denies HI/SI. Denies fever, chills, night sweats, N/V, HA, blurry vision, dizziness, CP, SOB, or any other problems or concerns this visit.  Review of Systems  Constitutional: Negative.  Ear/Nose/Mouth/Throat: Negative.  Respiratory: No shortness of breath, No cough, No sputum production, No hemoptysis, No wheezing.  Cardiovascular: No chest pain, No palpitations, No peripheral edema, No syncope.  Breast: Negative.  Gastrointestinal: No nausea, No vomiting, No diarrhea, No constipation, No hematemesis Abdominal pain: All quadrants.  Genitourinary: Negative.  Hematology/Lymphatics: Negative.  Endocrine: Negative.  Immunologic:  Negative.  Musculoskeletal: No back pain, No neck pain, No joint pain, No muscle pain, No decreased range of motion.  Integumentary: Negative.  Neurologic: Negative except as documented in history of present illness.  Physical Exam  Vitals & Measurements  T:?36.8? ?C (Oral)? HR:?76(Peripheral)? RR:?20? BP:?130/79?  HT:?158?cm? HT:?158?cm? WT:?67.9?kg? WT:?67.9?kg? BMI:?27.2?  General: Alert and oriented, No acute distress._  Eye: Pupils are equal, round and reactive to light, Extraocular movements are intact.  HENT: Normocephalic.  Neck: Supple, Non-tender, No carotid bruit, No lymphadenopathy.  Respiratory: Lungs are clear to auscultation, Respirations are non-labored, Breath sounds are equal, Symmetrical chest wall expansion.  Cardiovascular: Normal rate, Regular rhythm, No murmur. + 2 pedal pulses. No edema  Gastrointestinal: Soft, Non-tender, Non-distended, Normal bowel sounds. No hepatosplenomegaly or lymphadenopathy.  Musculoskeletal: Normal range of motion.  Integumentary: Warm, Dry, Intact. Small area erythematous patch to chest.  Neurologic: No focal deficits, Cranial Nerves II-XII are grossly intact.  Assessment/Plan  1.?Neutropenia  ? Labs ordered  Ordered:  Antinuclear Antibodies by [facility] [id number], Routine collect, 07/26/18 15:09:00 CDT, Blood, Order for future visit, Stop date 07/26/18 15:09:00 CDT, Lab Collect, Hepatitis C  Neutropenia  Thrombocytopenia, 07/26/18 15:09:00 CDT  Clinic Follow up, *Est. 09/26/18 12:00:00 CDT, in 2 mos with ANTHONY Horner, Order for future visit, Leg edema, [facility]  Ferritin, Routine collect, 07/26/18 15:09:00 CDT, Blood, Order for future visit, Stop date 07/26/18 15:09:00 CDT, Lab Collect, Hepatitis C  Neutropenia  Thrombocytopenia, 07/26/18 15:09:00 CDT  GGT, Routine collect, 07/26/18 15:09:00 CDT, Blood, Order for future visit, Stop date 07/26/18 15:09:00 CDT, Lab Collect, Hepatitis C  Neutropenia  Thrombocytopenia, 07/26/18 15:09:00 CDT  JAY[facility] [place]  Quantitative Urine-[facility] [id number], Routine collect, Urine, Order for future visit, 07/26/18 15:07:00 CDT, Stop date 07/26/18 15:07:00 CDT, Nurse collect, Neutropenia  Lactate Dehydrogenase, Routine collect, 07/26/18 15:08:00 CDT, Blood, Order for future visit, Stop date 07/26/18 15:08:00 CDT, Lab Collect, Hepatitis C  Neutropenia, 07/26/18 15:08:00 CDT  Protein Electrophoresis-[facility] [id number], Routine collect, *Est. 07/26/18 3:00:00 CDT, Blood, Order for future visit, *Est. Stop date 07/26/18 3:00:00 CDT, Lab Collect, Neutropenia, 07/26/18 14:22:00 CDT  PT, Routine collect, 07/26/18 15:08:00 CDT, Blood, Order for future visit, Stop date 07/26/18 15:08:00 CDT, Lab Collect, Hepatitis C  Neutropenia, 07/26/18 15:08:00 CDT  PTT, Routine collect, *Est. 07/26/18 3:00:00 CDT, Blood, Order for future visit, *Est. Stop date 07/26/18 3:00:00 CDT, Lab Collect, No Charge, Hepatitis C  Neutropenia, 07/26/18 15:08:00 CDT  ?  2.?HTN - Hypertension  RX: Hctz 12.5?  Enc low sodium/DASH diet.  ?  3.?Tobacco user  Enc tobacco cessation. Not ready for TCI at this point  ?  4.?Hepatitis C  ?HCV RNA quant  US liver ordered  Ordered:  Antinuclear Antibodies by [facility] [id number], Routine collect, 07/26/18 15:09:00 CDT, Blood, Order for future visit, Stop date 07/26/18 15:09:00 CDT, Lab Collect, Hepatitis C  Neutropenia  Thrombocytopenia, 07/26/18 15:09:00 CDT  Clinic Follow up, *Est. 09/26/18 12:00:00 CDT, in 2 mos with ANTHONY Horner, Order for future visit, Leg edema, [facility]  Ferritin, Routine collect, 07/26/18 15:09:00 CDT, Blood, Order for future visit, Stop date 07/26/18 15:09:00 CDT, Lab Collect, Hepatitis C  Neutropenia  Thrombocytopenia, 07/26/18 15:09:00 CDT  GGT, Routine collect, 07/26/18 15:09:00 CDT, Blood, Order for future visit, Stop date 07/26/18 15:09:00 CDT, Lab Collect, Hepatitis C  Neutropenia  Thrombocytopenia, 07/26/18 15:09:00 CDT  HCV RNA PAL Qual rfx to Quant LC [id number], Routine collect, 07/26/18 14:31:00  CDT, Blood, Order for future visit, Stop date 07/26/18 14:31:00 CDT, Lab Collect, Hepatitis C, 07/26/18 14:31:00 CDT  Lactate Dehydrogenase, Routine collect, 07/26/18 15:08:00 CDT, Blood, Order for future visit, Stop date 07/26/18 15:08:00 CDT, Lab Collect, Hepatitis C  Neutropenia, 07/26/18 15:08:00 CDT  PT, Routine collect, 07/26/18 15:08:00 CDT, Blood, Order for future visit, Stop date 07/26/18 15:08:00 CDT, Lab Collect, Hepatitis C  Neutropenia, 07/26/18 15:08:00 CDT  PT, Routine collect, 07/26/18 14:32:00 CDT, Blood, Order for future visit, Stop date 07/26/18 14:32:00 CDT, Lab Collect, Hepatitis C, 07/26/18 14:32:00 CDT  PTT, Routine collect, *Est. 07/26/18 3:00:00 CDT, Blood, Order for future visit, *Est. Stop date 07/26/18 3:00:00 CDT, Lab Collect, No Charge, Hepatitis C  Neutropenia, 07/26/18 15:08:00 CDT  US Abdomen Limited, Routine, 07/26/18 14:31:00 CDT, Hepatitis, None, Ambulatory, Rad Type, Order for future visit, Hepatitis C, Schedule this test, [facility], 07/26/18 14:31:00 CDT  ?  5.?Situational depression  ?RX: Fluoxetine  RTC in 2 mo to evaluate  Ordered:  FLUoxetine, 10 mg = 1 cap(s), Oral, Daily, # 30 cap(s), 2 Refill(s), Pharmacy: [id number]  Clinic Follow up, *Est. 09/26/18 12:00:00 CDT, in 2 mos with ANTHONY Horner, Order for future visit, Leg edema, [facility]  ?  6.?Thrombocytopenia  ?ER precautions discussed with pt. Will c/s with Dr. Galdamez.  ?  Ordered:  Antinuclear Antibodies by [facility] [id number], Routine collect, 07/26/18 15:09:00 CDT, Blood, Order for future visit, Stop date 07/26/18 15:09:00 CDT, Lab Collect, Hepatitis C  Neutropenia  Thrombocytopenia, 07/26/18 15:09:00 CDT  Ferritin, Routine collect, 07/26/18 15:09:00 CDT, Blood, Order for future visit, Stop date 07/26/18 15:09:00 CDT, Lab Collect, Hepatitis C  Neutropenia  Thrombocytopenia, 07/26/18 15:09:00 CDT  GGT, Routine collect, 07/26/18 15:09:00 CDT, Blood, Order for future visit, Stop  date 07/26/18 15:09:00 CDT, Lab Collect, Hepatitis C  Neutropenia  Thrombocytopenia, 07/26/18 15:09:00 CDT  ?  Leg edema  RX: HCTZ 12.5  Ordered:  hydrochlorothiazide, 12.5 mg = 1 tab(s), Oral, Daily, # 30 tab(s), 3 Refill(s), Pharmacy: [id number]  Clinic Follow up, *Est. 09/26/18 12:00:00 CDT, in 2 mos with ANTHONY Horner, Order for future visit, Leg edema, [facility]  ?  RTC with PCP in?2 mo to evaluate, review labs and depression.   Problem List/Past Medical History  Ongoing  Abscess of foot  HTN - Hypertension  Neutropenia  Tobacco user  Historical  No qualifying data  Procedure/Surgical History  Drainage of Right Foot Skin, External Approach (12/08/2017)  Incision and drainage of abscess (eg, carbuncle, suppurative hidradenitis, cutaneous or subcutaneous abscess, cyst, furuncle, or paronychia); complicated or multiple (12/08/2017)  Drainage of Right Foot Skin, External Approach (11/28/2017)  Incision and drainage of hematoma, seroma or fluid collection (11/28/2017)  hernia repair   Medications  diclofenac sodium 75 mg oral delayed release tablet, 75 mg= 1 tab(s), Oral, BID, PRN  TRIAMT/HCTZ TAB 75-50MG, 1 tab(s), Oral, Daily,? ?Not taking  ZOLPIDEM TAB 10MG, 10 mg= 1 tab(s), Oral, qPM,? ?Not taking  Allergies  No Known Medication Allergies  Social History  Alcohol  Current, Wine, 1-2 times per week, 11/22/2017  Home/Environment  Lives with son. Living situation: Home/Independent. Alcohol abuse in household: No. Substance abuse in household: No. Smoker in household: Yes. Injuries/Abuse/Neglect in household: No. Feels unsafe at home: No. Safe place to go: Yes. Agency(s)/Others notified: No. Family/Friends available for support: Yes. Concern for family members at home: No. Major illness in household: No. Financial concerns: Yes. TV/Computer concerns: No., 07/26/2018  Nutrition/Health  Regular, 12/13/2017  Substance Abuse  Never, 11/22/2017  Tobacco  Current every day smoker Use:. Cigars Type:. 2 per  day., 07/26/2018  Family History  Diabetes mellitus type 2: Mother.  Hypertension.: Mother.  Health Maintenance  Health Maintenance  ???Pending?(in the next year)  ??? ??OverDue  ??? ? ? ?Diabetes Screening due??and every?  ??? ??Due?  ??? ? ? ?Alcohol Misuse Screening due??07/26/18??and every 1??year(s)  ??? ? ? ?Aspirin Therapy for CVD Prevention due??07/26/18??and every 1??year(s)  ??? ? ? ?Breast Cancer Screening due??07/26/18??and every?  ??? ? ? ?Cervical Cancer Screening due??07/26/18??and every?  ??? ? ? ?Colorectal Screening due??07/26/18??and every?  ??? ? ? ?Depression Screening due??07/26/18??and every?  ??? ? ? ?Hypertension Management-Education due??07/26/18??and every 1??year(s)  ??? ? ? ?Hypertension Maintenance-Medication Prescribed due??07/26/18??and every 1??year(s)  ??? ? ? ?Influenza Vaccine due??07/26/18??and every?  ??? ? ? ?Tetanus Vaccine due??07/26/18??and every 10??year(s)  ??? ? ? ?Zoster Vaccine due??07/26/18??and every 100??year(s)  ??? ??Due In Future?  ??? ? ? ?Blood Pressure Screening not due until??12/13/18??and every 1??year(s)  ??? ? ? ?Hypertension Management-Blood Pressure not due until??12/13/18??and every 1??year(s)  ??? ? ? ?Smoking Cessation not due until??12/13/18??and every 1??year(s)  ??? ? ? ?Body Mass Index Check not due until??07/10/19??and every 1??year(s)  ??? ? ? ?Obesity Screening not due until??07/10/19??and every 1??year(s)  ??? ? ? ?Hypertension Management-BMP not due until??07/17/19??and every 1??year(s)  ???Satisfied?(in the past 1 year)  ??? ??Satisfied?  ??? ? ? ?Blood Pressure Screening on??07/10/18.??Satisfied by Jose Antonio Landers Jr., RN  ??? ? ? ?Body Mass Index Check on??07/10/18.??Satisfied by Jose Antonio Landers Jr., RN  ??? ? ? ?Diabetes Screening on??07/17/18.??Satisfied by Teresa Horner NP  ??? ? ? ?Hypertension Management-BMP on??07/17/18.??Satisfied by Lidia Titus  ??? ? ? ?Lipid Screening on??07/17/18.??Satisfied by Lidia Titus  ??? ? ?  ?Obesity Screening on??07/10/18.??Satisfied by Anshul Alatorre RN, Jose Antonio  ??? ? ? ?Smoking Cessation on??12/13/17.??Satisfied by Chrissy Pena LPN  ?  ?

## 2022-05-03 DIAGNOSIS — E55.9 VITAMIN D DEFICIENCY: ICD-10-CM

## 2022-05-03 DIAGNOSIS — I10 HYPERTENSION, UNSPECIFIED TYPE: Primary | ICD-10-CM

## 2022-05-12 ENCOUNTER — OFFICE VISIT (OUTPATIENT)
Dept: INTERNAL MEDICINE | Facility: CLINIC | Age: 67
End: 2022-05-12
Payer: MEDICAID

## 2022-05-12 DIAGNOSIS — Z72.0 TOBACCO USER: Primary | ICD-10-CM

## 2022-05-12 DIAGNOSIS — F32.A DEPRESSION, UNSPECIFIED DEPRESSION TYPE: ICD-10-CM

## 2022-05-12 DIAGNOSIS — D69.6 THROMBOCYTOPENIA, UNSPECIFIED: ICD-10-CM

## 2022-05-12 DIAGNOSIS — I10 HYPERTENSION, UNSPECIFIED TYPE: ICD-10-CM

## 2022-05-12 PROBLEM — B19.20 COMPENSATED HCV CIRRHOSIS: Status: ACTIVE | Noted: 2022-05-12

## 2022-05-12 PROBLEM — K74.69 COMPENSATED HCV CIRRHOSIS: Status: ACTIVE | Noted: 2022-05-12

## 2022-05-12 PROCEDURE — 99213 OFFICE O/P EST LOW 20 MIN: CPT | Mod: 95,,, | Performed by: NURSE PRACTITIONER

## 2022-05-12 PROCEDURE — 1159F PR MEDICATION LIST DOCUMENTED IN MEDICAL RECORD: ICD-10-PCS | Mod: CPTII,95,, | Performed by: NURSE PRACTITIONER

## 2022-05-12 PROCEDURE — 1160F RVW MEDS BY RX/DR IN RCRD: CPT | Mod: CPTII,95,, | Performed by: NURSE PRACTITIONER

## 2022-05-12 PROCEDURE — 1160F PR REVIEW ALL MEDS BY PRESCRIBER/CLIN PHARMACIST DOCUMENTED: ICD-10-PCS | Mod: CPTII,95,, | Performed by: NURSE PRACTITIONER

## 2022-05-12 PROCEDURE — 1159F MED LIST DOCD IN RCRD: CPT | Mod: CPTII,95,, | Performed by: NURSE PRACTITIONER

## 2022-05-12 PROCEDURE — 99213 PR OFFICE/OUTPT VISIT, EST, LEVL III, 20-29 MIN: ICD-10-PCS | Mod: 95,,, | Performed by: NURSE PRACTITIONER

## 2022-05-12 RX ORDER — DICLOFENAC SODIUM 10 MG/G
2 GEL TOPICAL
COMMUNITY
Start: 2022-01-31 | End: 2022-08-29

## 2022-05-12 RX ORDER — POTASSIUM CHLORIDE 750 MG/1
10 TABLET, EXTENDED RELEASE ORAL DAILY
Qty: 90 TABLET | Refills: 0 | Status: SHIPPED | OUTPATIENT
Start: 2022-05-12 | End: 2022-08-03 | Stop reason: SDUPTHER

## 2022-05-12 RX ORDER — FLUOXETINE HYDROCHLORIDE 40 MG/1
40 CAPSULE ORAL DAILY
Qty: 90 CAPSULE | Refills: 0 | Status: SHIPPED | OUTPATIENT
Start: 2022-05-12 | End: 2022-08-03 | Stop reason: SDUPTHER

## 2022-05-12 RX ORDER — HYDROCHLOROTHIAZIDE 12.5 MG/1
12.5 TABLET ORAL
COMMUNITY
Start: 2022-01-31 | End: 2022-05-12

## 2022-05-12 RX ORDER — HYDROCHLOROTHIAZIDE 12.5 MG/1
12.5 TABLET ORAL DAILY
Qty: 90 TABLET | Refills: 0 | Status: SHIPPED | OUTPATIENT
Start: 2022-05-12 | End: 2022-08-03 | Stop reason: SDUPTHER

## 2022-05-12 NOTE — PROGRESS NOTES
The patient location is: home  The chief complaint leading to consultation is: routine visit    Visit type: audio only      30+ minutes of total time spent on the encounter, which includes face to face time and non-face to face time preparing to see the patient (eg, review of tests), Obtaining and/or reviewing separately obtained history, Documenting clinical information in the electronic or other health record, Independently interpreting results (not separately reported) and communicating results to the patient/family/caregiver, or Care coordination (not separately reported).         Each patient to whom he or she provides medical services by telemedicine is:  (1) informed of the relationship between the physician and patient and the respective role of any other health care provider with respect to management of the patient; and (2) notified that he or she may decline to receive medical services by telemedicine and may withdraw from such care at any time.    Notes:   Internal Medicine Clinic  [facility]ATHARINE Cooney     Patient Name: Paula Vilchis   : 1955  MRN:[id number]     CC:  Chief Complaint   Patient presents with    Follow-up        HPI  66 year old AAF, presents TM for lab review but unfortunately she did not have blood work done prior to the apt. PMH HTN, HEP C Treated , Liver Cirrhosis, depression with anxiety, Neutropenia, thrombocytopenia (likely from hep Cirrhosis), tobacco user (Smokes 2-4 cigars per day), past ETOH use, daily marijuana use, stuttering. Previously with ID clinic, finished 12 week course of Epclusa on 2020, undetectable viral load. Following [facility] GI clinic for MELD-Na score 12  Child-Layton Class B (score 7). Pt is completely asymptomatic at this time. ABD US 2021; Heterogenicity and nodular contour to the liver parenchyma  suggestive of cirrhotic changes. Status post cholecystectomy.    Prior ECHO (abd US 2020; displayed Prominent inferior vena cava  measuring up to 2.9 cm in AP diameter at the level of the liver.  Repeat ECHO performed on 9/16/2021, and displayed EF 55%, grade 1 diastolic dysfunction, Aortic valve sclerosis without stenosis, mild to moderate concentric left ventricular hypertrophy, mild mitral and tricuspid regurgitation. Referred to [facility] clinic 10/26/2021, apt pending. Referred to Dr. Nuñez Rheum for +RF, +CCP and multiple joint pain, apt pending; referral faxed 11/4/2021.  Denies CP, SOB, HA, easy bruising, bleeding, epistaxis, gum bleeding, GI/ bleeding, bone pain, night sweats, Vomiting, diarrhea, constipation, tammy colored stools, fatigue, weakness, abd pain, fever, icterus.    EGD normal 1/5/2022 Dr. Cavazos.  Following [facility] GI clinic, Dr. Segovia, next apt scheduled 7/2022  Stopped alcohol use since Feb 2020.  Reports occasionally using Tylenol and IBU.  HEP C treatment; 12 weeks of Epclusa. She completed tx 5/11/2020.   DEXA 11/10/2021 normal.  MMG 2/26/2021; BIRADS 1; f/u mmg scheduled 2/28/2022  FIT neg 4/5/2021  [facility] 4/5/2021; PAP; NIL; scheduled 7/6/2022          ROS  Review of Systems   Constitutional: Negative.    HENT: Negative.    Eyes: Negative.    Respiratory: Negative.    Cardiovascular: Negative.    Gastrointestinal: Negative.    Endocrine: Negative.    Genitourinary: Negative.    Musculoskeletal: Negative.    Integumentary:  Negative.   Allergic/Immunologic: Negative.    Neurological: Negative.    Hematological: Negative.    Psychiatric/Behavioral: Negative.    All other systems reviewed and are negative.       Physical Examination:  There were no vitals filed for this visit.       Physical Exam  Nursing note reviewed.   Neurological:      Mental Status: She is alert.   Psychiatric:         Mood and Affect: Mood normal.         Behavior: Behavior normal.            Labs/Imaging:  Reviewed    Assessment/Plan:  1. Tobacco user  Smoking cessation advised. Instructed on smoking cessation program through [facility] and  pharmacological interventions to aid in cessation.    2. Thrombocytopenia, unspecified  R/T Cirrhosis of liver   Following [facility] GI clinic.  EGD normal 1/5/2022  ABD US 11/8/2021; Heterogenicity and nodular contour to the liver parenchyma  suggestive of cirrhotic changes. Status post cholecystectomy.      3. Hypertension, unspecified type  DASH diet: Eat more fruits, vegetables, and low fat dairy foods.  (Less than 2 grams of sodium per day).  Maintain healthy weight with goal BMI <30.   Exercise 30 minutes per day 5 days per week.  Home medications refilled and continued.   Home BP monitoring encouraged with BP parameters given.     4. Depression, unspecified depression type  Prozac 40 qd continued.  Practice deep breathing or abdominal breathing exercises when anxiety occurs.  Exercise daily. Get sunlight daily.  Avoid caffeine, alcohol and stimulants.  Practice positive phrases and repeat throughout the day, yoga, lavender scents or Chamomile tea will help anxiety.  Set healthy boundaries, avoid people and conversations that increase stress.  Reports any symptoms of suicidal or homicidal ideations immediately, if clinic is closed go to nearest emergency room.    Medication List with Changes/Refills   Current Medications    ALBUTEROL (PROVENTIL/VENTOLIN HFA) 90 MCG/ACTUATION INHALER    Inhale 2 puffs into the lungs every 4 (four) hours.    CETIRIZINE (ZYRTEC) 10 MG TABLET    Take 10 mg by mouth once daily. For 10 days    DICLOFENAC SODIUM (VOLTAREN) 1 % GEL    Apply 2 g topically.    DOXYCYCLINE (VIBRAMYCIN) 100 MG CAP    TAKE 1 CAPSULE BY MOUTH TWICE DAILY FOR 10 DAYS FOR INFECTION.    FAMOTIDINE (PEPCID) 20 MG TABLET    TAKE ONE TABLET BY MOUTH ONCE A DAY AT BEDTIME.    FLUTICASONE PROPIONATE (FLONASE) 50 MCG/ACTUATION NASAL SPRAY    USE 2 SPRAYS IN EACH NOSTRIL ONCE A DAY FOR ALLERGIES    HYDROXYZINE PAMOATE (VISTARIL) 25 MG CAP    TAKE ONE CAPSULE BY MOUTH 4 TIMES A DAY AS NEEDED FOR ANXIETY    LEVOCETIRIZINE  (XYZAL) 5 MG TABLET    Take 5 mg by mouth once daily.    MELOXICAM (MOBIC) 7.5 MG TABLET    TAKE 1 TABLET BY MOUTH ONCE DAILY AS NEEDED FOR INFLAMMATION AND PAIN WITH FOOD.    NEOMYCIN-POLYMYXIN-HYDROCORTISONE (CORTISPORIN) 3.5-10,000-1 MG/ML-UNIT/ML-% OTIC SUSPENSION    PUT 2 DROPS IN AFFECTED EAR(S) 4 TIMES A DAY.    OMEPRAZOLE (PRILOSEC) 40 MG CAPSULE    TAKE 1 CAPSULE BY MOUTH ONCE DAY FOR STOMACH    ONDANSETRON (ZOFRAN-ODT) 8 MG TBDL    DISSOLVE 1 TABLET ON THE TONGUE EVERY 6 HOURS AS NEEDED FOR NAUSEA OR VOMITING    PANTOPRAZOLE (PROTONIX) 40 MG TABLET    Take 40 mg by mouth.    PREDNISONE (DELTASONE) 20 MG TABLET    TAKE 1 TABLET BY MOUTH TWICE DAILY WITH FOOD FOR 5 DAYS.    PREDNISONE (DELTASONE) 50 MG TAB    Take 50 mg by mouth once daily.   Changed and/or Refilled Medications    Modified Medication Previous Medication    FLUOXETINE 40 MG CAPSULE FLUoxetine 40 MG capsule       Take 1 capsule (40 mg total) by mouth once daily.    Take 40 mg by mouth.    HYDROCHLOROTHIAZIDE (HYDRODIURIL) 12.5 MG TAB hydroCHLOROthiazide (HYDRODIURIL) 12.5 MG Tab       Take 1 tablet (12.5 mg total) by mouth once daily.    Take 12.5 mg by mouth.    POTASSIUM CHLORIDE SA (K-DUR,KLOR-CON) 10 MEQ TABLET potassium chloride SA (K-DUR,KLOR-CON) 10 MEQ tablet       Take 1 tablet (10 mEq total) by mouth once daily.    Take 10 mEq by mouth once daily.   Discontinued Medications    AMOXICILLIN (AMOXIL) 875 MG TABLET    Take 1 tablet by mouth 2 (two) times a day. For 10 days    AZITHROMYCIN (Z-OSKAR) 250 MG TABLET    TK 2 TS PO ON DAY 1, THEN TK 1 T PO D FOR 4 DAYS    BENZONATATE (TESSALON) 100 MG CAPSULE    Take 1 capsule by mouth 3 (three) times daily. For 7 days    DICLOFENAC SODIUM (VOLTAREN) 1 % GEL    APPLY 2 GRAMS TOPICALLY 4 TIMES DAILY AS NEEDED FOR PAIN    HYDROCHLOROTHIAZIDE (HYDRODIURIL) 12.5 MG TAB    TAKE 1 TABLET BY MOUTH ONCE A DAY FOR FLUID AND BLOOD PRESSURE.     Future Appointments   Date Time Provider Department Center    7/6/2022 10:10 AM JEREMY [facility] G[facility] [place] [facility]   7/11/2022  1:30 PM Aime Segovia MD [facility]STR[facility]            Labs thoroughly reviewed with patient. Medication refills addressed today.  RTC prn and 2 months, with labs 1 week prior to the apt.  COVID 19 precautions given to patient.  Patient voices understanding of all discharge instructions.

## 2022-05-16 ENCOUNTER — HOSPITAL ENCOUNTER (EMERGENCY)
Facility: HOSPITAL | Age: 67
Discharge: HOME | End: 2022-05-16
Attending: EMERGENCY MEDICINE
Payer: MEDICAID

## 2022-05-16 VITALS
SYSTOLIC BLOOD PRESSURE: 122 MMHG | RESPIRATION RATE: 16 BRPM | HEIGHT: 62 IN | OXYGEN SATURATION: 97 % | BODY MASS INDEX: 24.3 KG/M2 | DIASTOLIC BLOOD PRESSURE: 71 MMHG | HEART RATE: 77 BPM | TEMPERATURE: 98 F | WEIGHT: 132.06 LBS

## 2022-05-16 DIAGNOSIS — G89.29 CHRONIC RIGHT SHOULDER PAIN: Primary | ICD-10-CM

## 2022-05-16 DIAGNOSIS — M25.511 CHRONIC RIGHT SHOULDER PAIN: Primary | ICD-10-CM

## 2022-05-16 LAB
APPEARANCE UR: CLEAR
BACTERIA #/AREA URNS AUTO: ABNORMAL /HPF
BILIRUB UR QL STRIP.AUTO: ABNORMAL MG/DL
CAOX CRY URNS QL MICRO: ABNORMAL /HPF
COLOR UR AUTO: ABNORMAL
GLUCOSE UR QL STRIP.AUTO: NORMAL MG/DL
HYALINE CASTS #/AREA URNS LPF: ABNORMAL /LPF
KETONES UR QL STRIP.AUTO: NEGATIVE MG/DL
LEUKOCYTE ESTERASE UR QL STRIP.AUTO: NEGATIVE UNIT/L
NITRITE UR QL STRIP.AUTO: NEGATIVE
PH UR STRIP.AUTO: 6.5 [PH]
PROT UR QL STRIP.AUTO: ABNORMAL MG/DL
RBC #/AREA URNS AUTO: ABNORMAL /HPF
RBC UR QL AUTO: NEGATIVE UNIT/L
SP GR UR STRIP.AUTO: 1.03
UROBILINOGEN UR STRIP-ACNC: ABNORMAL MG/DL
WBC #/AREA URNS AUTO: ABNORMAL /HPF

## 2022-05-16 PROCEDURE — 99283 EMERGENCY DEPT VISIT LOW MDM: CPT | Mod: 25

## 2022-05-16 RX ORDER — ACETAMINOPHEN AND CODEINE PHOSPHATE 300; 30 MG/1; MG/1
1 TABLET ORAL EVERY 6 HOURS PRN
Qty: 12 TABLET | Refills: 0 | Status: SHIPPED | OUTPATIENT
Start: 2022-05-16 | End: 2022-05-26

## 2022-05-16 NOTE — ED PROVIDER NOTES
Encounter Date: 2022       History     Chief Complaint   Patient presents with    Shoulder Pain     R. Shoulder pain since Friday. Denies injury, hx arthritis     R shoulder pain for 3 days, no known injury but freq lifting at work, hx of chronic shoulder pain, increased pain with movement and decreased pain with immobilization        Review of patient's allergies indicates:  No Known Allergies  Past Medical History:   Diagnosis Date    Compensated HCV cirrhosis     Depression     HTN (hypertension)     Hx of colonic polyp     Situational depression     Thrombocytopenia, unspecified     Tobacco user      Past Surgical History:   Procedure Laterality Date     SECTION       SECTION       SECTION       SECTION      Drainage of Foot Skin, External Approach Right 2017    Draingage of Foot Skin, External Approach Right 2017    HERNIA REPAIR      Incision and Drainage of Abscess; complicated or multiple N/A 2017    Incision and Drainage of Hematoma, seroma or fluid collection N/A 2017     History reviewed. No pertinent family history.  Social History     Tobacco Use    Smoking status: Current Every Day Smoker     Types: Cigars    Smokeless tobacco: Never Used    Tobacco comment: 3 Black N Mild cigar a day   Substance Use Topics    Alcohol use: Yes     Comment: wine 2 times a month    Drug use: Yes     Frequency: 4.0 times per week     Types: Marijuana     Review of Systems   Constitutional: Negative for fever.   HENT: Negative for sore throat.    Respiratory: Negative for shortness of breath.    Cardiovascular: Negative for chest pain.   Gastrointestinal: Negative for nausea.   Genitourinary: Negative for dysuria.   Musculoskeletal: Negative for back pain.   Skin: Negative for rash.   Neurological: Negative for weakness.   Hematological: Does not bruise/bleed easily.   All other systems reviewed and are negative.      Physical Exam      Initial Vitals [05/16/22 1325]   BP Pulse Resp Temp SpO2   122/71 77 16 98.4 °F (36.9 °C) 97 %      MAP       --         Physical Exam    Nursing note and vitals reviewed.  Constitutional: She appears well-developed and well-nourished.   HENT:   Head: Normocephalic and atraumatic.   Neck: Neck supple.   Normal range of motion.  Cardiovascular: Normal rate and regular rhythm.   Pulmonary/Chest: Breath sounds normal.   Abdominal: Abdomen is soft. Bowel sounds are normal.   Musculoskeletal:      Cervical back: Normal range of motion and neck supple.      Comments: Mild ttp R shoulder, decreased rom d/t pain, good distal pulses, NVI     Neurological: She is alert and oriented to person, place, and time. She has normal strength.   Skin: Skin is warm and dry.   Psychiatric: She has a normal mood and affect.         ED Course   Procedures  Labs Reviewed - No data to display       Imaging Results          X-ray Shoulder 2 or More Views Right (Final result)  Result time 05/16/22 15:26:50    Final result by Don Arias MD (05/16/22 15:26:50)                 Impression:      No acute findings.      Electronically signed by: Don Arias  Date:    05/16/2022  Time:    15:26             Narrative:    EXAMINATION:  XR SHOULDER COMPLETE 2 OR MORE VIEWS RIGHT    CLINICAL HISTORY:  shoulder pain;    COMPARISON:  26 November 2019    FINDINGS:  Three views of the right shoulder demonstrate no fracture or dislocation.  Mild glenohumeral degenerative change.                                 Medications - No data to display  Medical Decision Making:   History:   Old Records Summarized: records from clinic visits and records from previous admission(s).                      Clinical Impression:   Final diagnoses:  [M25.511, G89.29] Chronic right shoulder pain (Primary)          ED Disposition Condition    Discharge Stable        ED Prescriptions     Medication Sig Dispense Start Date End Date Auth. Provider    acetaminophen-codeine  300-30mg (TYLENOL #3) 300-30 mg Tab Take 1 tablet by mouth every 6 (six) hours as needed (pain). 12 tablet 5/16/2022 5/26/2022 KEVIN Navarro        Follow-up Information     Follow up With Specialties Details Why Contact Info    follow up with pcp in 3-5 days        [facility] - Emergency Dept Emergency Medicine  If symptoms worsen [address] [place] [address] [phone]           KEVIN Navarro  05/16/22 3946

## 2022-05-16 NOTE — Clinical Note
"Paula"Ivonne Vilchis was seen and treated in our emergency department on 5/16/2022.  She may return to work on 05/18/2022.       If you have any questions or concerns, please don't hesitate to call.      USAMA NavarroP"

## 2022-06-20 ENCOUNTER — TELEPHONE (OUTPATIENT)
Dept: INTERNAL MEDICINE | Facility: CLINIC | Age: 67
End: 2022-06-20

## 2022-07-06 ENCOUNTER — OFFICE VISIT (OUTPATIENT)
Dept: GYNECOLOGY | Facility: CLINIC | Age: 67
End: 2022-07-06
Payer: MEDICAID

## 2022-07-06 VITALS
HEIGHT: 62 IN | TEMPERATURE: 98 F | OXYGEN SATURATION: 100 % | RESPIRATION RATE: 18 BRPM | BODY MASS INDEX: 24.48 KG/M2 | HEART RATE: 70 BPM | WEIGHT: 133 LBS | SYSTOLIC BLOOD PRESSURE: 127 MMHG | DIASTOLIC BLOOD PRESSURE: 75 MMHG

## 2022-07-06 DIAGNOSIS — Z01.419 ENCOUNTER FOR ANNUAL ROUTINE GYNECOLOGICAL EXAMINATION: Primary | ICD-10-CM

## 2022-07-06 DIAGNOSIS — Z72.0 TOBACCO USE: ICD-10-CM

## 2022-07-06 PROCEDURE — 1159F PR MEDICATION LIST DOCUMENTED IN MEDICAL RECORD: ICD-10-PCS | Mod: CPTII,,, | Performed by: NURSE PRACTITIONER

## 2022-07-06 PROCEDURE — 1160F PR REVIEW ALL MEDS BY PRESCRIBER/CLIN PHARMACIST DOCUMENTED: ICD-10-PCS | Mod: CPTII,,, | Performed by: NURSE PRACTITIONER

## 2022-07-06 PROCEDURE — 3008F PR BODY MASS INDEX (BMI) DOCUMENTED: ICD-10-PCS | Mod: CPTII,,, | Performed by: NURSE PRACTITIONER

## 2022-07-06 PROCEDURE — 3078F PR MOST RECENT DIASTOLIC BLOOD PRESSURE < 80 MM HG: ICD-10-PCS | Mod: CPTII,,, | Performed by: NURSE PRACTITIONER

## 2022-07-06 PROCEDURE — 1159F MED LIST DOCD IN RCRD: CPT | Mod: CPTII,,, | Performed by: NURSE PRACTITIONER

## 2022-07-06 PROCEDURE — 99214 OFFICE O/P EST MOD 30 MIN: CPT | Mod: PBBFAC | Performed by: NURSE PRACTITIONER

## 2022-07-06 PROCEDURE — 99396 PREV VISIT EST AGE 40-64: CPT | Mod: S$PBB,,, | Performed by: NURSE PRACTITIONER

## 2022-07-06 PROCEDURE — 3078F DIAST BP <80 MM HG: CPT | Mod: CPTII,,, | Performed by: NURSE PRACTITIONER

## 2022-07-06 PROCEDURE — 3008F BODY MASS INDEX DOCD: CPT | Mod: CPTII,,, | Performed by: NURSE PRACTITIONER

## 2022-07-06 PROCEDURE — 3074F PR MOST RECENT SYSTOLIC BLOOD PRESSURE < 130 MM HG: ICD-10-PCS | Mod: CPTII,,, | Performed by: NURSE PRACTITIONER

## 2022-07-06 PROCEDURE — 1160F RVW MEDS BY RX/DR IN RCRD: CPT | Mod: CPTII,,, | Performed by: NURSE PRACTITIONER

## 2022-07-06 PROCEDURE — 3074F SYST BP LT 130 MM HG: CPT | Mod: CPTII,,, | Performed by: NURSE PRACTITIONER

## 2022-07-06 PROCEDURE — 99396 PR PREVENTIVE VISIT,EST,40-64: ICD-10-PCS | Mod: S$PBB,,, | Performed by: NURSE PRACTITIONER

## 2022-07-06 RX ORDER — TRIAMCINOLONE ACETONIDE 1 MG/G
CREAM TOPICAL
COMMUNITY
Start: 2022-07-05 | End: 2022-08-29

## 2022-07-06 NOTE — PROGRESS NOTES
"  Subjective:       Patient ID: Paula Vilchis is a 66 y.o. female.    Chief Complaint:  No chief complaint on file.      History of Present Illness  The patient  here for annual exam. Pt is postmenopausal since late 40s. Denies history of abnormal paps. Last pap -NIL and HPV neg.  MG-22 & BIRADS 1. Denies breast or urinary complaints. Denies pelvic pain, abnormal bleeding or discharge. Colonoscopy approx 5 years ago, hx of polyps. Pt followed by GI for Hep C. Admits tobacco use. DEXA 2021-WNL. Dep. screening 0. Denies fly hx of breast, ovarian, uterine or colon cancer.       GYN & OB History  No LMP recorded. Patient is postmenopausal.     Review of patient's allergies indicates:  No Known Allergies  Past Medical History:   Diagnosis Date    Compensated HCV cirrhosis     Depression     HTN (hypertension)     Hx of colonic polyp     Situational depression     Thrombocytopenia, unspecified     Tobacco user      OB History    Para Term  AB Living   4 4           SAB IAB Ectopic Multiple Live Births                  # Outcome Date GA Lbr Caden/2nd Weight Sex Delivery Anes PTL Lv   4 Para            3 Para            2 Para            1 Para                 Review of Systems  Review of Systems    Negative except for pertinent findings for positives per HPI     Objective:    Physical Exam    /75 (BP Location: Left arm, Patient Position: Sitting, BP Method: Medium (Automatic))   Pulse 70   Temp 97.6 °F (36.4 °C)   Resp 18   Ht 5' 2" (1.575 m)   Wt 60.3 kg (133 lb)   SpO2 100%   BMI 24.33 kg/m²   GENERAL: Well-developed female in no acute distress.  SKIN: Normal to inspection, warm and intact.  BREASTS: No masses, lumps, discharge, tenderness.  VULVA: General appearance normal; external genitalia with no lesions or erythema.  VAGINA: Mucosa normal, pale, no discharge or lesions.  CERVIX: Grossly normal, pale, no erythema or discharge.  BIMANUAL EXAM: reveals a 6 " week-sized uterus. The uterus is non tender. Dc adnexa reveal no evidence of masses, tenderness.  PSYCHIATRIC: Patient is oriented to person, place, and time. Mood and affect are normal.    Assessment:       1. Encounter for annual routine gynecological examination    2. Tobacco use       Plan:   Diagnoses and all orders for this visit:    Encounter for annual routine gynecological examination    Tobacco use    Pelvic today, Pap utd per ACOG.  Smoking cessation counseling provided. Instructed on smoking cessation program through [facility] and pharmacological interventions to aid in cessation.  Follow up in about 1 year (around 7/6/2023) for annual exam.

## 2022-07-11 NOTE — TELEPHONE ENCOUNTER
Received a refill request through the fax que for hydroxyzine pamote 25 mg    LOV: 03/07/2022  NOV: 07/14/2022  No Show: 06/29/2022

## 2022-07-13 ENCOUNTER — LAB VISIT (OUTPATIENT)
Dept: LAB | Facility: HOSPITAL | Age: 67
End: 2022-07-13
Attending: NURSE PRACTITIONER
Payer: MEDICAID

## 2022-07-13 DIAGNOSIS — I10 ESSENTIAL HYPERTENSION, MALIGNANT: Primary | ICD-10-CM

## 2022-07-13 DIAGNOSIS — E55.9 AVITAMINOSIS D: ICD-10-CM

## 2022-07-13 LAB
ABS NEUT CALC (OHS): 0.76 X10(3)/MCL (ref 2.1–9.2)
ALBUMIN SERPL-MCNC: 3 GM/DL (ref 3.4–4.8)
ALBUMIN/GLOB SERPL: 0.6 RATIO (ref 1.1–2)
ALP SERPL-CCNC: 170 UNIT/L (ref 40–150)
ALT SERPL-CCNC: 25 UNIT/L (ref 0–55)
APPEARANCE UR: ABNORMAL
AST SERPL-CCNC: 50 UNIT/L (ref 5–34)
BASOPHILS NFR BLD MANUAL: 0.03 X10(3)/MCL (ref 0–0.2)
BASOPHILS NFR BLD MANUAL: 1 % (ref 0–2)
BILIRUB UR QL STRIP.AUTO: NEGATIVE MG/DL
BILIRUBIN DIRECT+TOT PNL SERPL-MCNC: 0.9 MG/DL
BUN SERPL-MCNC: 9 MG/DL (ref 9.8–20.1)
CALCIUM SERPL-MCNC: 8.8 MG/DL (ref 8.4–10.2)
CHLORIDE SERPL-SCNC: 105 MMOL/L (ref 98–107)
CHOLEST SERPL-MCNC: 128 MG/DL
CHOLEST/HDLC SERPL: 3 {RATIO} (ref 0–5)
CO2 SERPL-SCNC: 26 MMOL/L (ref 23–31)
COLOR UR AUTO: YELLOW
CREAT SERPL-MCNC: 0.64 MG/DL (ref 0.55–1.02)
DEPRECATED CALCIDIOL+CALCIFEROL SERPL-MC: 26.6 NG/ML (ref 30–80)
EOSINOPHIL NFR BLD MANUAL: 0.3 X10(3)/MCL (ref 0–0.9)
EOSINOPHIL NFR BLD MANUAL: 11 % (ref 0–8)
ERYTHROCYTE [DISTWIDTH] IN BLOOD BY AUTOMATED COUNT: 16.1 % (ref 11.5–17)
GLOBULIN SER-MCNC: 4.9 GM/DL (ref 2.4–3.5)
GLUCOSE SERPL-MCNC: 77 MG/DL (ref 82–115)
GLUCOSE UR QL STRIP.AUTO: NEGATIVE MG/DL
HCT VFR BLD AUTO: 38.2 % (ref 37–47)
HDLC SERPL-MCNC: 40 MG/DL (ref 35–60)
HGB BLD-MCNC: 12.1 GM/DL (ref 12–16)
IMM GRANULOCYTES # BLD AUTO: 0 X10(3)/MCL (ref 0–0.04)
IMM GRANULOCYTES NFR BLD AUTO: 0 %
KETONES UR QL STRIP.AUTO: NEGATIVE MG/DL
LDLC SERPL CALC-MCNC: 76 MG/DL (ref 50–140)
LEUKOCYTE ESTERASE UR QL STRIP.AUTO: NEGATIVE UNIT/L
LYMPHOCYTES NFR BLD MANUAL: 1.13 X10(3)/MCL
LYMPHOCYTES NFR BLD MANUAL: 42 % (ref 13–40)
MCH RBC QN AUTO: 27.5 PG (ref 27–31)
MCHC RBC AUTO-ENTMCNC: 31.7 MG/DL (ref 33–36)
MCV RBC AUTO: 86.8 FL (ref 80–94)
MONOCYTES NFR BLD MANUAL: 0.49 X10(3)/MCL (ref 0.1–1.3)
MONOCYTES NFR BLD MANUAL: 18 % (ref 2–11)
NEUTROPHILS NFR BLD MANUAL: 27 % (ref 47–80)
NEUTS BAND NFR BLD MANUAL: 1 % (ref 0–11)
NITRITE UR QL STRIP.AUTO: NEGATIVE
PH UR STRIP.AUTO: 8.5 [PH]
PLATELET # BLD AUTO: 105 X10(3)/MCL (ref 130–400)
PLATELET # BLD EST: ABNORMAL 10*3/UL
PMV BLD AUTO: 10.4 FL (ref 7.4–10.4)
POTASSIUM SERPL-SCNC: 4.3 MMOL/L (ref 3.5–5.1)
PROT SERPL-MCNC: 7.9 GM/DL (ref 5.8–7.6)
PROT UR QL STRIP.AUTO: NEGATIVE MG/DL
RBC # BLD AUTO: 4.4 X10(6)/MCL (ref 4.2–5.4)
RBC UR QL AUTO: NEGATIVE UNIT/L
SODIUM SERPL-SCNC: 137 MMOL/L (ref 136–145)
SP GR UR STRIP.AUTO: 1.01
TRIGL SERPL-MCNC: 59 MG/DL (ref 37–140)
UROBILINOGEN UR STRIP-ACNC: 4 MG/DL
VLDLC SERPL CALC-MCNC: 12 MG/DL
WBC # SPEC AUTO: 2.7 X10(3)/MCL (ref 4.5–11.5)

## 2022-07-13 PROCEDURE — 82306 VITAMIN D 25 HYDROXY: CPT

## 2022-07-13 PROCEDURE — 80061 LIPID PANEL: CPT

## 2022-07-13 PROCEDURE — 81003 URINALYSIS AUTO W/O SCOPE: CPT

## 2022-07-13 PROCEDURE — 80053 COMPREHEN METABOLIC PANEL: CPT

## 2022-07-13 PROCEDURE — 36415 COLL VENOUS BLD VENIPUNCTURE: CPT

## 2022-07-13 PROCEDURE — 85025 COMPLETE CBC W/AUTO DIFF WBC: CPT

## 2022-07-19 RX ORDER — HYDROXYZINE PAMOATE 25 MG/1
CAPSULE ORAL
Qty: 45 CAPSULE | Refills: 2 | Status: SHIPPED | OUTPATIENT
Start: 2022-07-19 | End: 2022-08-03 | Stop reason: SDUPTHER

## 2022-07-27 ENCOUNTER — HOSPITAL ENCOUNTER (EMERGENCY)
Facility: HOSPITAL | Age: 67
Discharge: HOME | End: 2022-07-27
Attending: GENERAL ACUTE CARE HOSPITAL
Payer: MEDICAID

## 2022-07-27 VITALS
HEART RATE: 69 BPM | RESPIRATION RATE: 20 BRPM | OXYGEN SATURATION: 100 % | BODY MASS INDEX: 23.04 KG/M2 | HEIGHT: 63 IN | SYSTOLIC BLOOD PRESSURE: 149 MMHG | DIASTOLIC BLOOD PRESSURE: 86 MMHG | TEMPERATURE: 98 F | WEIGHT: 130 LBS

## 2022-07-27 DIAGNOSIS — B35.3 TINEA PEDIS OF RIGHT FOOT: ICD-10-CM

## 2022-07-27 DIAGNOSIS — M79.674 GREAT TOE PAIN, RIGHT: Primary | ICD-10-CM

## 2022-07-27 PROCEDURE — 99283 EMERGENCY DEPT VISIT LOW MDM: CPT

## 2022-07-27 RX ORDER — CLOTRIMAZOLE 1 %
CREAM (GRAM) TOPICAL 2 TIMES DAILY
Qty: 28 G | Refills: 0 | Status: SHIPPED | OUTPATIENT
Start: 2022-07-27 | End: 2023-08-21

## 2022-07-28 NOTE — ED PROVIDER NOTES
Source of History:  Patient    Chief complaint:  Toe Pain (Pt states she had pedicure  3 weeks ago and started having pain to rt big toe 2 weeks ago, pt states when she took off her polish toenail and toenail is cracked and discolored. Pt states referred to podiatrist in [place] but office closed.)      HPI:  Paula Vilchis is a 66 y.o. female presenting with right toenail pain.  Patient denies fevers chills.  There is no significant redness, erythema, discharge.  There is no significant skin tissue swelling.  The toenail is cracked and yellow discoloration.    This is the extent to the patients complaints today here in the emergency department.    ROS: As per HPI and below:  General: No fever.  No chills.  Eyes: No visual changes.  ENT: No sore throat. No ear pain  Head: No headache.    Chest: No shortness of breath. No cough.  Cardiovascular: No chest pain.  Abdomen: No abdominal pain.  No nausea or vomiting.  Genito-Urinary: No abnormal urination.  Neurologic: No focal weakness.  No numbness.  MSK: No myalgias. No arthralgias.   Integument: No rashes or lesions.  Left great toe pain.  Psych: No confusion      Review of patient's allergies indicates:  No Known Allergies    PMH:  As per HPI and below:  Past Medical History:   Diagnosis Date    Compensated HCV cirrhosis     Depression     HTN (hypertension)     Hx of colonic polyp     Situational depression     Thrombocytopenia, unspecified     Tobacco user      Past Surgical History:   Procedure Laterality Date     SECTION       SECTION       SECTION       SECTION      Drainage of Foot Skin, External Approach Right 2017    Draingage of Foot Skin, External Approach Right 2017    HERNIA REPAIR      Incision and Drainage of Abscess; complicated or multiple N/A 2017    Incision and Drainage of Hematoma, seroma or fluid collection N/A 2017       Social History     Tobacco Use    Smoking  "status: Current Every Day Smoker     Types: Cigars    Smokeless tobacco: Never Used    Tobacco comment: 3 Black N Mild cigar a day   Substance Use Topics    Alcohol use: Yes     Comment: wine 2 times a month    Drug use: Yes     Frequency: 4.0 times per week     Types: Marijuana       Physical Exam:    BP (!) 149/86   Pulse 69   Temp 98.4 °F (36.9 °C)   Resp 20   Ht 5' 3" (1.6 m)   Wt 59 kg (130 lb)   SpO2 100%   BMI 23.03 kg/m²   Nursing note and vital signs reviewed.  Appearance: Afebrile. Not toxic appearing. No acute distress.  Head: Atraumatic  Eyes: No conjunctival injection. No scleral icterus  ENT: Normal phonation  Chest/ Respiratory: No respiratory distress. No accessory muscle use.  Cardiovascular: Regular rate   Abdomen:  Not distended.    Musculoskeletal: Good range of motion all joints.  No deformities.  Neck supple.  No meningismus.  Skin: No rashes seen.  Good turgor.  No ecchymoses.  Neurologic: GCS 15. Ambulates with a steady gait.   Mental Status:  Alert and oriented x 3.  Appropriate, conversant    Labs that have been ordered have been independently reviewed and interpreted by myself.    I decided to obtain the patient's medical records.  Summary of Medical Records:  Nursing documentation    Initial Impression/ Differential Dx:  Tinea pedis    MDM:    66 y.o. female with dry, cracked, yellow discolored toe presents to the emergency department for evaluation.  Patient denies history of tinea pedis.  Patient was referred to podiatrist by whom is unknown however she states the podiatrist office was closed.  We will treat the patient for tinea pedis and have her follow-up with podiatry.                   Diagnostic Impression:    1. Great toe pain, right    2. Tinea pedis of right foot         ED Disposition Condition    Discharge Stable          ED Prescriptions     Medication Sig Dispense Start Date End Date Auth. Provider    clotrimazole (LOTRIMIN) 1 % cream Apply topically 2 (two) " times daily. for 14 days 28 g 7/27/2022 8/10/2022 KATHARINE Nova        Follow-up Information     Follow up With Specialties Details Why Contact Info    KATHARINE Cooney Nurse Practitioner Call in 1 day For ER Follow Up. [address] [facility] [address]  [phone]             KATHARINE Nova  07/27/22 2626

## 2022-08-03 ENCOUNTER — OFFICE VISIT (OUTPATIENT)
Dept: INTERNAL MEDICINE | Facility: CLINIC | Age: 67
End: 2022-08-03
Attending: NURSE PRACTITIONER
Payer: MEDICAID

## 2022-08-03 DIAGNOSIS — K74.69 COMPENSATED HCV CIRRHOSIS: ICD-10-CM

## 2022-08-03 DIAGNOSIS — B19.20 COMPENSATED HCV CIRRHOSIS: ICD-10-CM

## 2022-08-03 DIAGNOSIS — F41.9 ANXIETY AND DEPRESSION: ICD-10-CM

## 2022-08-03 DIAGNOSIS — Z72.0 TOBACCO USER: ICD-10-CM

## 2022-08-03 DIAGNOSIS — F32.A ANXIETY AND DEPRESSION: ICD-10-CM

## 2022-08-03 DIAGNOSIS — F12.90 USES MARIJUANA: ICD-10-CM

## 2022-08-03 DIAGNOSIS — F10.10 ALCOHOL ABUSE: ICD-10-CM

## 2022-08-03 DIAGNOSIS — E55.9 VITAMIN D DEFICIENCY: ICD-10-CM

## 2022-08-03 DIAGNOSIS — D70.9 NEUTROPENIA, UNSPECIFIED TYPE: ICD-10-CM

## 2022-08-03 DIAGNOSIS — D69.6 THROMBOCYTOPENIA, UNSPECIFIED: ICD-10-CM

## 2022-08-03 DIAGNOSIS — I10 HYPERTENSION, UNSPECIFIED TYPE: ICD-10-CM

## 2022-08-03 PROCEDURE — 1160F PR REVIEW ALL MEDS BY PRESCRIBER/CLIN PHARMACIST DOCUMENTED: ICD-10-PCS | Mod: CPTII,95,, | Performed by: NURSE PRACTITIONER

## 2022-08-03 PROCEDURE — 99214 PR OFFICE/OUTPT VISIT, EST, LEVL IV, 30-39 MIN: ICD-10-PCS | Mod: 95,25,, | Performed by: NURSE PRACTITIONER

## 2022-08-03 PROCEDURE — 1159F PR MEDICATION LIST DOCUMENTED IN MEDICAL RECORD: ICD-10-PCS | Mod: CPTII,95,, | Performed by: NURSE PRACTITIONER

## 2022-08-03 PROCEDURE — 99214 OFFICE O/P EST MOD 30 MIN: CPT | Mod: 95,25,, | Performed by: NURSE PRACTITIONER

## 2022-08-03 PROCEDURE — 99406 PR TOBACCO USE CESSATION INTERMEDIATE 3-10 MINUTES: ICD-10-PCS | Mod: 95,,, | Performed by: NURSE PRACTITIONER

## 2022-08-03 PROCEDURE — 1159F MED LIST DOCD IN RCRD: CPT | Mod: CPTII,95,, | Performed by: NURSE PRACTITIONER

## 2022-08-03 PROCEDURE — 1160F RVW MEDS BY RX/DR IN RCRD: CPT | Mod: CPTII,95,, | Performed by: NURSE PRACTITIONER

## 2022-08-03 PROCEDURE — 99406 BEHAV CHNG SMOKING 3-10 MIN: CPT | Mod: 95,,, | Performed by: NURSE PRACTITIONER

## 2022-08-03 RX ORDER — NAPROXEN 500 MG/1
500 TABLET ORAL 2 TIMES DAILY
COMMUNITY
Start: 2022-07-26 | End: 2022-08-03

## 2022-08-03 RX ORDER — HYDROCHLOROTHIAZIDE 12.5 MG/1
12.5 TABLET ORAL DAILY
Qty: 90 TABLET | Refills: 1 | Status: SHIPPED | OUTPATIENT
Start: 2022-08-03 | End: 2022-08-29

## 2022-08-03 RX ORDER — POTASSIUM CHLORIDE 750 MG/1
10 TABLET, EXTENDED RELEASE ORAL DAILY
Qty: 90 TABLET | Refills: 1 | Status: SHIPPED | OUTPATIENT
Start: 2022-08-03 | End: 2023-02-14 | Stop reason: SDUPTHER

## 2022-08-03 RX ORDER — ASPIRIN 325 MG
50000 TABLET, DELAYED RELEASE (ENTERIC COATED) ORAL WEEKLY
Qty: 12 CAPSULE | Refills: 0 | Status: SHIPPED | OUTPATIENT
Start: 2022-08-03 | End: 2022-08-29

## 2022-08-03 RX ORDER — FLUOXETINE HYDROCHLORIDE 40 MG/1
40 CAPSULE ORAL DAILY
Qty: 90 CAPSULE | Refills: 1 | Status: SHIPPED | OUTPATIENT
Start: 2022-08-03 | End: 2023-02-27 | Stop reason: SDUPTHER

## 2022-08-03 RX ORDER — HYDROXYZINE PAMOATE 25 MG/1
CAPSULE ORAL
Qty: 45 CAPSULE | Refills: 2 | Status: SHIPPED | OUTPATIENT
Start: 2022-08-03 | End: 2022-12-02 | Stop reason: SDUPTHER

## 2022-08-03 NOTE — PROGRESS NOTES
Established Patient - Audio Only Telehealth Visit     The patient location is: home  The chief complaint leading to consultation is: lab review  Visit type: Virtual visit with audio only (telephone)  Total time spent with patient: 40       The reason for the audio only service rather than synchronous audio and video virtual visit was related to technical difficulties or patient preference/necessity.     Each patient to whom I provide medical services by telemedicine is:  (1) informed of the relationship between the physician and patient and the respective role of any other health care provider with respect to management of the patient; and (2) notified that they may decline to receive medical services by telemedicine and may withdraw from such care at any time. Patient verbally consented to receive this service via voice-only telephone call.       [facility]  KATHARINE Cooney     Patient Name: Paula Vilchis   : 1955  MRN:[id number]     CC:  Chief Complaint   Patient presents with    Follow-up     Lab results        HPI  66 year old AAF, presents TM for lab review. PMH HTN, HEP C Treated , Liver Cirrhosis, Neutropenia, thrombocytopenia (likely from hep Cirrhosis), mixed hearing loss, positive RF & CCP, tobacco user (Smokes 2-4 cigars per day), alcohol use, daily marijuana use, stuttering.   Previously with ID clinic, finished 12 week course of Epclusa on 2020, undetectable viral load. Following [facility] GI clinic for MELD-Na score 12 Child-Layton Class B (score 7). Pt is completely asymptomatic at this time. ABD US 2021; Heterogenicity and nodular contour to the liver parenchyma suggestive of cirrhotic changes. Status post cholecystectomy. Reports occasionally using Tylenol and IBU. States she quit drinking alcohol but is drinking wine again, ~2 times per week.     Prior ECHO (abd US 2020; displayed Prominent inferior vena cava measuring up to 2.9 cm in AP diameter at the  level of the liver.  Repeat ECHO performed on 9/16/2021, and displayed EF 55%, grade 1 diastolic dysfunction, Aortic valve sclerosis without stenosis, mild to moderate concentric left ventricular hypertrophy, mild mitral and tricuspid regurgitation. Referred to [facility] Cardio clinic 10/26/2021, apt pending. Referred to Dr. Nuñez Rheum for +RF, +CCP and multiple joint pain, apt pending; referral faxed 11/4/2021.  Denies CP, SOB, HA, easy bruising, bleeding, epistaxis, gum bleeding, GI/ bleeding, bone pain, night sweats, Vomiting, diarrhea, constipation, tammy colored stools, fatigue, weakness, abd pain, fever, icterus.     EGD normal 1/5/2022 Dr. Cavazos.  Following [facility] GI clinic, Dr. Segovia, next apt scheduled 8/29/2022 (pt missed several apts)  HEP C treatment; 12 weeks of Epclusa. She completed tx 5/11/2020.   DEXA 11/10/2021 normal.  MMG 2/28/2022: BIRADS 1  FIT neg 4/5/2021  [facility] 4/5/2021; PAP; NIL               ROS  Review of Systems   Constitutional: Negative.    HENT: Negative.    Eyes: Negative.    Respiratory: Negative.    Cardiovascular: Negative.    Gastrointestinal: Negative.    Endocrine: Negative.    Genitourinary: Negative.    Musculoskeletal: Negative.    Integumentary:  Negative.   Allergic/Immunologic: Negative.    Neurological: Negative.    Hematological: Negative.    Psychiatric/Behavioral: Negative.    All other systems reviewed and are negative.       Physical Examination:  There were no vitals filed for this visit.       Physical Exam  Nursing note reviewed.   Neurological:      Mental Status: She is alert.   Psychiatric:         Mood and Affect: Mood normal.         Behavior: Behavior normal.         Thought Content: Thought content normal.            Labs/Imaging:  Reviewed    Assessment/Plan:  1. Hypertension, unspecified type  - CBC Auto Differential; Future  - Comprehensive Metabolic Panel; Future  - Lipid Panel; Future    HCTZ and KDUR refilled  DASH diet: Eat more fruits, vegetables,  and low fat dairy foods.  (Less than 2 grams of sodium per day).  Maintain healthy weight with goal BMI <30.   Exercise 30 minutes per day 5 days per week.  Home medications refilled and continued.   Home BP monitoring encouraged with BP parameters given.       Sodium Level   Date Value Ref Range Status   07/13/2022 137 136 - 145 mmol/L Final     Potassium Level   Date Value Ref Range Status   07/13/2022 4.3 3.5 - 5.1 mmol/L Final     Blood Urea Nitrogen   Date Value Ref Range Status   07/13/2022 9.0 (L) 9.8 - 20.1 mg/dL Final     Creatinine   Date Value Ref Range Status   07/13/2022 0.64 0.55 - 1.02 mg/dL Final     Estimated GFR-Non    Date Value Ref Range Status   01/28/2022 >60         2. Compensated HCV cirrhosis    Following [facility] GI clinic; apt this month.  EGD normal 1/5/2022  ABD US 11/8/2021; Heterogenicity and nodular contour to the liver parenchyma  suggestive of cirrhotic changes.  DEXA normal 11/2021  Albumin Level   Date Value Ref Range Status   07/13/2022 3.0 (L) 3.4 - 4.8 gm/dL Final     Bilirubin Total   Date Value Ref Range Status   07/13/2022 0.9 <=1.5 mg/dL Final     Alkaline Phosphatase   Date Value Ref Range Status   07/13/2022 170 (H) 40 - 150 unit/L Final     Aspartate Aminotransferase   Date Value Ref Range Status   07/13/2022 50 (H) 5 - 34 unit/L Final     Alanine Aminotransferase   Date Value Ref Range Status   07/13/2022 25 0 - 55 unit/L Final     CIRRHOSIS COUNSELING:  - strict abstinence of alcohol use  - low sodium (salt) 2000mg per day  - Nutrition: 25-30kcal (calorie per body weight in kilogram) per day  - No need to restrict protein in diet  - High protein diet: 1.2-1.5 gram/kg (protein per body weight in kg) per day to prevent muscle mass loss  - Resistance exercises for muscle strength  - Avoid raw seafoods due to risk of fatal Vibrio vulnificus infection  - Avoid Non-steroidal anti-inflammatory drugs (NSAIDs) such as ibuprofen, Motrin, naproxen, Aleve due to risk  of kidney damage  - Can take acetaminophen (tylenol), no more than 2000mg per day  - Compliance with all medications  - Ultrasound or MRI of the liver every 6 months for liver cancer screening  - Upper endoscopy every 1-2 years to screen for varices      3. Thrombocytopenia, unspecified  R/T Cirrhosis of liver   Following [facility] GI clinic.  EGD normal 1/5/2022  ABD US 11/8/2021; Heterogenicity and nodular contour to the liver parenchyma  suggestive of cirrhotic changes.    4. Neutropenia, unspecified type  R/T Cirrhosis of liver   Following [facility] GI clinic.  EGD normal 1/5/2022  ABD US 11/8/2021; Heterogenicity and nodular contour to the liver parenchyma  suggestive of cirrhotic changes.    5. Anxiety and depression  Stable. Refill on Prozac 40 and Vistaril prn.   Practice deep breathing or abdominal breathing exercises when anxiety occurs.  Exercise daily. Get sunlight daily.  Avoid caffeine, alcohol and stimulants.  Practice positive phrases and repeat throughout the day, yoga, lavender scents or Chamomile tea will help anxiety.  Set healthy boundaries, avoid people and conversations that increase stress.  Reports any symptoms of suicidal or homicidal ideations immediately, if clinic is closed go to nearest emergency room.    6. Vitamin D deficiency  - Vitamin D; Future  Vitamin D level is low at 26. Will start patient on a prescription of vitamin D3 97371 IU once a week for 12 weeks. Once this prescription is completed, patient advised to purchase OTC vitamin D3 2000 IU and take this once a day thereafter or unless notified otherwise. Repeat Vitamin D level at follow up.    7. Tobacco user  Smoking cessation advised. Instructed on smoking cessation program through [facility] and pharmacological interventions to aid in cessation.  >5 minutes tobacco cessation education    8. Alcohol abuse  Cessation encouraged in the setting of liver disease    9. Uses marijuana  Cessation encouraged      Medication List with  Changes/Refills   New Medications    CHOLECALCIFEROL, VITAMIN D3, 1,250 MCG (50,000 UNIT) CAPSULE    Take 1 capsule (50,000 Units total) by mouth once a week.   Current Medications    ALBUTEROL (PROVENTIL/VENTOLIN HFA) 90 MCG/ACTUATION INHALER    Inhale 2 puffs into the lungs every 4 (four) hours.    CETIRIZINE (ZYRTEC) 10 MG TABLET    Take 10 mg by mouth once daily. For 10 days    CLOTRIMAZOLE (LOTRIMIN) 1 % CREAM    Apply topically 2 (two) times daily. for 14 days    DICLOFENAC SODIUM (VOLTAREN) 1 % GEL    Apply 2 g topically.    DOXYCYCLINE (VIBRAMYCIN) 100 MG CAP    TAKE 1 CAPSULE BY MOUTH TWICE DAILY FOR 10 DAYS FOR INFECTION.    FLUTICASONE PROPIONATE (FLONASE) 50 MCG/ACTUATION NASAL SPRAY    USE 2 SPRAYS IN EACH NOSTRIL ONCE A DAY FOR ALLERGIES    NEOMYCIN-POLYMYXIN-HYDROCORTISONE (CORTISPORIN) 3.5-10,000-1 MG/ML-UNIT/ML-% OTIC SUSPENSION    PUT 2 DROPS IN AFFECTED EAR(S) 4 TIMES A DAY.    PANTOPRAZOLE (PROTONIX) 40 MG TABLET    Take 40 mg by mouth.    PREDNISONE (DELTASONE) 20 MG TABLET    TAKE 1 TABLET BY MOUTH TWICE DAILY WITH FOOD FOR 5 DAYS.    PREDNISONE (DELTASONE) 50 MG TAB    Take 50 mg by mouth once daily.    TRIAMCINOLONE ACETONIDE 0.1% (KENALOG) 0.1 % CREAM    Apply topically.   Changed and/or Refilled Medications    Modified Medication Previous Medication    FLUOXETINE 40 MG CAPSULE FLUoxetine 40 MG capsule       Take 1 capsule (40 mg total) by mouth once daily.    Take 1 capsule (40 mg total) by mouth once daily.    HYDROCHLOROTHIAZIDE (HYDRODIURIL) 12.5 MG TAB hydroCHLOROthiazide (HYDRODIURIL) 12.5 MG Tab       Take 1 tablet (12.5 mg total) by mouth once daily.    Take 1 tablet (12.5 mg total) by mouth once daily.    HYDROXYZINE PAMOATE (VISTARIL) 25 MG CAP hydrOXYzine pamoate (VISTARIL) 25 MG Cap       TAKE ONE CAPSULE BY MOUTH 4 TIMES A DAY AS NEEDED FOR ANXIETY    TAKE ONE CAPSULE BY MOUTH 4 TIMES A DAY AS NEEDED FOR ANXIETY    POTASSIUM CHLORIDE SA (K-DUR,KLOR-CON M) 10 MEQ TABLET potassium  chloride SA (K-DUR,KLOR-CON) 10 MEQ tablet       Take 1 tablet (10 mEq total) by mouth once daily.    Take 1 tablet (10 mEq total) by mouth once daily.   Discontinued Medications    FAMOTIDINE (PEPCID) 20 MG TABLET    TAKE ONE TABLET BY MOUTH ONCE A DAY AT BEDTIME.    LEVOCETIRIZINE (XYZAL) 5 MG TABLET    Take 5 mg by mouth once daily.    MELOXICAM (MOBIC) 7.5 MG TABLET    TAKE 1 TABLET BY MOUTH ONCE DAILY AS NEEDED FOR INFLAMMATION AND PAIN WITH FOOD.    NAPROXEN (NAPROSYN) 500 MG TABLET    Take 500 mg by mouth 2 (two) times daily.    OMEPRAZOLE (PRILOSEC) 40 MG CAPSULE    TAKE 1 CAPSULE BY MOUTH ONCE DAY FOR STOMACH    ONDANSETRON (ZOFRAN-ODT) 8 MG TBDL    DISSOLVE 1 TABLET ON THE TONGUE EVERY 6 HOURS AS NEEDED FOR NAUSEA OR VOMITING        Orders Placed This Encounter   Procedures    CBC Auto Differential    Comprehensive Metabolic Panel    Lipid Panel    Vitamin D         Future Appointments   Date Time Provider Department Center   8/29/2022  1:30 PM Aime Segovia MD [facility] GASTRO Last    7/10/2023  8:30 AM JEREMY [facility] GYN Last Un        Labs thoroughly reviewed with patient. Medication refills addressed today.  RTC prn and 3-4 months, with labs 1 week prior to the apt.  COVID 19 precautions given to patient.  Patient voices understanding of all discharge instructions.                            This service was not originating from a related E/M service provided within the previous 7 days nor will  to an E/M service or procedure within the next 24 hours or my soonest available appointment.  Prevailing standard of care was able to be met in this audio-only visit.

## 2022-08-04 ENCOUNTER — TELEPHONE (OUTPATIENT)
Dept: INTERNAL MEDICINE | Facility: CLINIC | Age: 67
End: 2022-08-04

## 2022-08-04 NOTE — TELEPHONE ENCOUNTER
Contacted Cardio and talked to Carol and she stated that she will give Ms. Mitchell's information to the referral nurse and the nurse will contact the pt and get her scheduled. I also called Dr. Nuñez's office they did not answer but, I did leave a VM to call back to [facility].

## 2022-08-04 NOTE — TELEPHONE ENCOUNTER
----- Message from KATHARINE Cooney sent at 8/3/2022  2:41 PM CDT -----  Pt was referred in the past to Cardio and Rheum and has not been seen. Please follow up.    ECHO performed on 9/16/2021, and displayed EF 55%, grade 1 diastolic dysfunction, Aortic valve sclerosis without stenosis, mild to moderate concentric left ventricular hypertrophy, mild mitral and tricuspid regurgitation. Referred to [facility] Cardio clinic 10/26/2021, apt pending.    Referred to Dr. Nuñez Rheum for +RF, +CCP and multiple joint pain, apt pending; referral faxed 11/4/2021.

## 2022-08-15 NOTE — TELEPHONE ENCOUNTER
Contact Dr. Nuñez's office no answered left a VM to [place] [facility] [place] If I do not hear anything back by Wednesday I will call to f/u again.

## 2022-08-15 NOTE — TELEPHONE ENCOUNTER
They were able to schedule her for 02/01 at 2:30 here at [facility]. They also placed her on a waiting list if they have a cancellation.

## 2022-08-29 ENCOUNTER — OFFICE VISIT (OUTPATIENT)
Dept: GASTROENTEROLOGY | Facility: CLINIC | Age: 67
End: 2022-08-29
Payer: MEDICAID

## 2022-08-29 VITALS
OXYGEN SATURATION: 100 % | SYSTOLIC BLOOD PRESSURE: 144 MMHG | HEART RATE: 76 BPM | RESPIRATION RATE: 20 BRPM | DIASTOLIC BLOOD PRESSURE: 83 MMHG | HEIGHT: 63 IN | BODY MASS INDEX: 23.5 KG/M2 | WEIGHT: 132.63 LBS | TEMPERATURE: 98 F

## 2022-08-29 DIAGNOSIS — B19.20 COMPENSATED HCV CIRRHOSIS: ICD-10-CM

## 2022-08-29 DIAGNOSIS — K29.70 GASTRITIS, PRESENCE OF BLEEDING UNSPECIFIED, UNSPECIFIED CHRONICITY, UNSPECIFIED GASTRITIS TYPE: Primary | ICD-10-CM

## 2022-08-29 DIAGNOSIS — K74.69 COMPENSATED HCV CIRRHOSIS: ICD-10-CM

## 2022-08-29 PROCEDURE — 99214 OFFICE O/P EST MOD 30 MIN: CPT | Mod: PBBFAC | Performed by: STUDENT IN AN ORGANIZED HEALTH CARE EDUCATION/TRAINING PROGRAM

## 2022-08-29 RX ORDER — PANTOPRAZOLE SODIUM 40 MG/1
40 TABLET, DELAYED RELEASE ORAL DAILY
Qty: 90 TABLET | Refills: 3 | Status: SHIPPED | OUTPATIENT
Start: 2022-08-29 | End: 2023-05-30

## 2022-08-29 NOTE — PROGRESS NOTES
Subjective:       Patient ID: Paula Vilchis is a 66 y.o. female.    Chief Complaint: HCV Cirrohsis (Pt states when she wakes up in the morning and takes her medication her stomach starts to cramp up states feeling sharp pain, the pain last for a while then goes away states that when she is eating she can feel pressure.)    66-year-old female, history of hepatitis-C treated with Epclusa achieved SVR12 in September of 2020, presenting to clinic today for follow-up appointment.  She had originally been referred to transplant in 2019 when 1st diagnosed with hepatitis-C had F4 disease at that time as well but did not attend that appointment.  Initial ultrasound had revealed heterogeneous appearing liver that showed probable cirrhosis, her risk factors include tattoos and a blood transfusion before 1992 and a long-term partner with hepatitis-C.  Patient also smoker.  Most recent EGD was performed January 2022 that revealed no varices, normal stomach and duodenum.  Most up-to-date ultrasound the abdomen was November 8, 2021 that revealed no liver lesions present.  Is not on PPI therapy however she has been complaining of acid reflux for many years and a chronic cough.  I prescribed her PPI therapy back in November with 3 refills.  Patient requesting PPI therapy again at this time she is currently out, is complaining of similar symptoms she had a previous visit of nonspecific they abdominal pain.  She does admit recently she has been using NSAIDs, most notably Aleve has been using it more frequently sometimes 3 or 4 times per day due to hip and back pain.  Advised her to discontinue Aleve she can use Tylenol as needed but no more than 2000 mg per day.  She denies any melena, hematochezia, or hematemesis.  Denies any jaundice, lower extremity swelling.  She is overdue for updated abdominal ultrasound.  Her DEXA scan was normal.  Review of Systems   Constitutional: Negative.    HENT: Negative.     Eyes: Negative.     Respiratory: Negative.     Cardiovascular: Negative.    Gastrointestinal:  Positive for abdominal pain and reflux.   Endocrine: Negative.    Genitourinary: Negative.    Allergic/Immunologic: Negative.    Neurological: Negative.    Hematological: Negative.    Psychiatric/Behavioral: Negative.         Objective:   Vitals:    08/29/22 1342   BP: (!) 144/83   Pulse: 76   Resp: 20   Temp: 98.4 °F (36.9 °C)           Physical Exam  Constitutional:       Appearance: Normal appearance. She is normal weight.   HENT:      Nose: Nose normal.      Mouth/Throat:      Mouth: Mucous membranes are moist.      Pharynx: Oropharynx is clear.   Eyes:      Extraocular Movements: Extraocular movements intact.      Conjunctiva/sclera: Conjunctivae normal.      Pupils: Pupils are equal, round, and reactive to light.   Cardiovascular:      Rate and Rhythm: Normal rate and regular rhythm.   Pulmonary:      Effort: Pulmonary effort is normal.      Breath sounds: Normal breath sounds.   Abdominal:      General: Abdomen is flat. Bowel sounds are normal.      Palpations: Abdomen is soft.   Musculoskeletal:         General: Normal range of motion.   Skin:     General: Skin is warm and dry.   Neurological:      General: No focal deficit present.      Mental Status: She is alert and oriented to person, place, and time. Mental status is at baseline.   Psychiatric:         Mood and Affect: Mood normal.         Thought Content: Thought content normal.         Judgment: Judgment normal.       Assessment:       Problem List Items Addressed This Visit          GI    Compensated HCV cirrhosis    Relevant Orders    US Abdomen Limited_Liver     Other Visit Diagnoses       Gastritis, presence of bleeding unspecified, unspecified chronicity, unspecified gastritis type    -  Primary    Relevant Medications    pantoprazole (PROTONIX) 40 MG tablet    Other Relevant Orders    Helicobacter Pylori Antigen Fecal EIA              Plan:       Background:  Alcohol:  yes, in the past    Tobacco: yes    Liver disease: HCV    MELD-Na: 12  Child-Layton Class: A    Transplant: not under evaluation, low MELD    Cirrhosis is decompensated with:    Ascites: no  Spontaneous bacterial peritonitis: No  Hepatic Encephalopathy: No  Hepatocellular carcinoma: No  Hepatorenal syndrome: No  Hyponatremia: No  Muscle wasting: No  Portal vein thrombosis: No    Screening:  Last EGD:  January 2022, no varices, normal stomach and duodenum.      Last imaging study:  Abd US November 2021, no liver lesions present.    CIRRHOSIS COUNSELING:  - strict abstinence of alcohol use  - low sodium (salt) 2000mg per day  - Nutrition: 25-30kcal (calorie per body weight in kilogram) per day  - No need to restrict protein in diet  - High protein diet: 1.2-1.5 gram/kg (protein per body weight in kg) per day to prevent muscle mass loss  - Resistance exercises for muscle strength  - Avoid raw seafoods due to risk of fatal Vibrio vulnificus infection  - Avoid Non-steroidal anti-inflammatory drugs (NSAIDs) such as ibuprofen, Motrin, naproxen, Aleve due to risk of kidney damage  - Can take acetaminophen (tylenol), no more than 2000mg per day  - Compliance with all medications  - Ultrasound or MRI of the liver every 6 months for liver cancer screening  - Upper endoscopy every 1-2 years to screen for varices    Follow-up in 6 months, will need updated abdominal ultrasound.    Next EGD 2024.    Will refill her PPI therapy at this time, and send off for Helicobacter pylori stool antigen.    Discontinue NSAIDs.

## 2022-11-07 ENCOUNTER — TELEPHONE (OUTPATIENT)
Dept: GASTROENTEROLOGY | Facility: CLINIC | Age: 67
End: 2022-11-07
Payer: MEDICARE

## 2022-11-07 DIAGNOSIS — R10.9 ABDOMINAL PAIN, UNSPECIFIED ABDOMINAL LOCATION: Primary | ICD-10-CM

## 2022-11-07 NOTE — TELEPHONE ENCOUNTER
Spoke with pt. Still c/o abdominal pain/cramping in the mornings. She did confirm that she is taking her Pantoprazole every morning on an empty stomach. Requesting an appt to discuss this with Dr. Segovia. She is sched to see Dr. Segovia 3/2023. Please advise

## 2022-11-07 NOTE — TELEPHONE ENCOUNTER
----- Message from Divina Simmons sent at 11/4/2022  3:04 PM CDT -----  Regarding: Please call pt  Pt called and stated that in the mornings she has sharp pains and pressure pains in her stomach x2 weeks. She stated when the pain starts she begins sweating and feels like she will pass out. Pt can be reached @ [phone]          Thank You  Courtney CASAS

## 2022-11-11 NOTE — TELEPHONE ENCOUNTER
"Call from pt questioning what Dr. Segovia would like her to do. Still having abd pain/cramping every morning & intermittently throughout the day. She has not completed stool study for h-pylori. States, " I brought it to the lab in [place] and they told me they couldn't do it, that I had to mail it in." We discussed the importance of checking for H-pylori. Verbalized understanding. Advised her that I will call the lab @ [facility].  Spoke with the lab. They will process the specimen and ship it out for testing.  Spoke with the pt. Advised that she may bring her stool to [facility].. Pt verbalized understanding.     "

## 2022-11-16 ENCOUNTER — TELEPHONE (OUTPATIENT)
Dept: INTERNAL MEDICINE | Facility: CLINIC | Age: 67
End: 2022-11-16
Payer: MEDICARE

## 2022-11-16 NOTE — TELEPHONE ENCOUNTER
Please advise the patient that her PCP is currently unavailable but even so, a c/o sharp abdominal pain should be evaluated at the ED. Can inform the patient we can follow up from there.    Thanks.

## 2022-11-16 NOTE — TELEPHONE ENCOUNTER
----- Message from Samantha Schultz sent at 11/16/2022  3:07 PM CST -----  Regarding: appointment  Patient requesting an appointment, having sharp abdominal pain, patient states she is not constipated. Please advise

## 2022-11-18 ENCOUNTER — APPOINTMENT (OUTPATIENT)
Dept: LAB | Facility: HOSPITAL | Age: 67
End: 2022-11-18
Attending: STUDENT IN AN ORGANIZED HEALTH CARE EDUCATION/TRAINING PROGRAM
Payer: MEDICARE

## 2022-11-19 LAB — H. PYLORI STOOL: NEGATIVE

## 2022-11-22 ENCOUNTER — TELEPHONE (OUTPATIENT)
Dept: GASTROENTEROLOGY | Facility: CLINIC | Age: 67
End: 2022-11-22
Payer: MEDICARE

## 2022-11-22 NOTE — TELEPHONE ENCOUNTER
----- Message from Mariann Bay sent at 11/22/2022  8:49 AM CST -----  Patient requesting call back from [place].    Patient recently collected stool sample and awaiting results to determine if medications is needed,     per patient.      [phone]    Call sabi @ [phone]

## 2022-12-02 ENCOUNTER — TELEPHONE (OUTPATIENT)
Dept: GASTROENTEROLOGY | Facility: CLINIC | Age: 67
End: 2022-12-02
Payer: MEDICARE

## 2022-12-02 NOTE — TELEPHONE ENCOUNTER
----- Message from Mariann Bay sent at 12/2/2022  9:30 AM CST -----  Patient requesting call back from [place]    Still having stomach pains and would like to speak with nurse     [phone]    Called sabi @ [phone]     Thanks

## 2022-12-02 NOTE — TELEPHONE ENCOUNTER
Dr. Segovia texted and asked that I call scheduling and get her US scheduled soon.  Called scheduling and was able to her her in for 12/8 830.  I called to inform PT but she did not answer.  I left her a detailed voicemail with all of the information needed for that test.

## 2022-12-07 ENCOUNTER — TELEPHONE (OUTPATIENT)
Dept: GASTROENTEROLOGY | Facility: CLINIC | Age: 67
End: 2022-12-07
Payer: MEDICARE

## 2022-12-07 NOTE — TELEPHONE ENCOUNTER
Spoke to PT today. She called still with complaints of  ABD pain and new complaints of frequent urination especially at night.  PT states her urine is dark palomares color.  She did not receive my message that was left about her Ultrasound scheduled for tomorrow.  I gave her all instructions and advised when to show. She will have a ride tomorrow.  I also suggested that if her pain was worse and she felt the need to report to ED she should go there for evaluation.  She does not have a ride today so she will wait for her test tomorrow.  PT's speech was very different today.  She had trouble getting some of her words out.  PT insisted that she had a speech impairment. She denied feeling confused or foggy.  I spoke to Dr. Segovia.  He adivsed to order UA on PT and have her collect before coming in tomorrow for Ultrasound.  PT notified and verbalized understanding.

## 2022-12-08 ENCOUNTER — HOSPITAL ENCOUNTER (OUTPATIENT)
Dept: RADIOLOGY | Facility: HOSPITAL | Age: 67
Discharge: HOME | End: 2022-12-08
Attending: STUDENT IN AN ORGANIZED HEALTH CARE EDUCATION/TRAINING PROGRAM
Payer: MEDICARE

## 2022-12-08 DIAGNOSIS — B19.20 COMPENSATED HCV CIRRHOSIS: ICD-10-CM

## 2022-12-08 DIAGNOSIS — R35.0 FREQUENT URINATION: Primary | ICD-10-CM

## 2022-12-08 DIAGNOSIS — K74.69 COMPENSATED HCV CIRRHOSIS: ICD-10-CM

## 2022-12-08 PROCEDURE — 76705 ECHO EXAM OF ABDOMEN: CPT | Mod: TC

## 2022-12-20 ENCOUNTER — TELEPHONE (OUTPATIENT)
Dept: GASTROENTEROLOGY | Facility: CLINIC | Age: 67
End: 2022-12-20
Payer: MEDICARE

## 2022-12-20 NOTE — TELEPHONE ENCOUNTER
----- Message from Elena Fishman sent at 12/19/2022 11:45 AM CST -----  Pt called for ultra sound results . Please advise  Pt # [phone]

## 2023-01-09 ENCOUNTER — DOCUMENTATION ONLY (OUTPATIENT)
Dept: ADMINISTRATIVE | Facility: HOSPITAL | Age: 68
End: 2023-01-09
Payer: MEDICARE

## 2023-01-11 ENCOUNTER — TELEPHONE (OUTPATIENT)
Dept: GASTROENTEROLOGY | Facility: CLINIC | Age: 68
End: 2023-01-11
Payer: MEDICARE

## 2023-01-11 NOTE — TELEPHONE ENCOUNTER
----- Message from Rita Poole sent at 12/29/2022 11:38 AM CST -----  Regarding: Results  Pt called Clinic for Ultrasound results. Pt stated that she didn't speak to the Dr. About any results. Please advise [phone]    Thank you

## 2023-01-12 NOTE — TELEPHONE ENCOUNTER
Results to pt. Verbalized understanding. Still c/o occasional abdominal pain in the morning. Discussed how she is taking her Pantoprazole. Pt indicates that she is taking it with her other morning medications with breakfast. Advised pt that the Pantoprazole needs to be taken first thing in the morning by itself and that she needs to wait 30 min before ingesting anything else. Verbalized understanding

## 2023-01-31 PROBLEM — K21.9 GASTROESOPHAGEAL REFLUX DISEASE: Status: ACTIVE | Noted: 2023-01-31

## 2023-01-31 PROBLEM — B18.2 CHRONIC HEPATITIS C VIRUS INFECTION: Status: ACTIVE | Noted: 2023-01-31

## 2023-01-31 PROBLEM — F32.A ACUTE DEPRESSION: Status: ACTIVE | Noted: 2023-01-31

## 2023-01-31 PROBLEM — D69.6 LOW PLATELET COUNT: Status: ACTIVE | Noted: 2023-01-31

## 2023-01-31 PROBLEM — H90.5 SENSORINEURAL HEARING LOSS (SNHL) OF LEFT EAR: Status: ACTIVE | Noted: 2023-01-31

## 2023-01-31 NOTE — PROGRESS NOTES
Patient ID: [id number]     Chief Complaint: new appointment (Hand, palms, knee, shoulder blade, knee, back, feet all giving her trouble /Been going on for about a year/Middle finger on right hand has recently started locking up)      Referred By: KATHARINE Cooney    HPI:     Paula Vilchis is a 67 y.o. female here today for a new patient visit.      Referred here today by her PCP for + RF and CCP. C/o pain in hands, shoulders, lower back, legs, ankles and knees. Pain in right leg and palms of hands. Pain started approximately around 2018. Admits pain and swelling in hands, 2 days back she had swelling in hands and left ankle. Morning stiffness for 15 min or less.   History of hepatitis-C treated with Epclusa achieved SVR12 in 2020. Hep C qunt not detected in 2021.    Denies history of fevers, rashes, photosensitivity, oral or nasal ulcers, h/o MI, stroke, seizures, h/o PE or DVT, Raynaud's phenomenon, uveitis, malignancies.   Family history of autoimmune disease: none.  Pregnancies: 4  Miscarriages: none.    Smoking: Smokes black N mild cigar daily and marijuana.  Social History     Tobacco Use   Smoking Status Every Day    Types: Cigars   Smokeless Tobacco Never   Tobacco Comments    3 Black N Mild cigar a day        ----------------------------  Compensated HCV cirrhosis  Depression  HTN (hypertension)  Hx of colonic polyp  Situational depression  Thrombocytopenia, unspecified  Tobacco user     Past Surgical History:   Procedure Laterality Date     SECTION       SECTION       SECTION       SECTION      Drainage of Foot Skin, External Approach Right 2017    Draingage of Foot Skin, External Approach Right 2017    HERNIA REPAIR      Incision and Drainage of Abscess; complicated or multiple N/A 2017    Incision and Drainage of Hematoma, seroma or fluid collection N/A 2017       Review of patient's allergies indicates:  No Known  Allergies    Current Outpatient Medications   Medication Instructions    clotrimazole (LOTRIMIN) 1 % cream Topical (Top), 2 times daily    diclofenac sodium (VOLTAREN) 1 % Gel SMARTSI Gram(s) Topical 4 Times Daily PRN    diclofenac sodium 2 % SoPk Apply to area 2-3 times a day prn    FLUoxetine 40 mg, Oral, Daily    hydrOXYzine pamoate (VISTARIL) 25 MG Cap TAKE ONE CAPSULE BY MOUTH 4 TIMES A DAY AS NEEDED FOR ANXIETY    multivitamin (THERAGRAN) per tablet 1 tablet, Oral, Daily    pantoprazole (PROTONIX) 40 mg, Oral, Daily    potassium chloride SA (K-DUR,KLOR-CON M) 10 MEQ tablet 10 mEq, Oral, Daily       Social History     Socioeconomic History    Marital status: Single    Number of children: 4   Occupational History    Occupation: Unemployed   Tobacco Use    Smoking status: Every Day     Types: Cigars    Smokeless tobacco: Never    Tobacco comments:     3 Black N Mild cigar a day   Substance and Sexual Activity    Alcohol use: Yes     Alcohol/week: 3.0 standard drinks     Types: 3 Glasses of wine per week     Comment: Ocassionally    Drug use: Yes     Frequency: 4.0 times per week     Types: Marijuana    Sexual activity: Yes     Birth control/protection: Post-menopausal        Family History   Problem Relation Age of Onset    Diabetes Father     Hypertension Father     Cancer Mother     Hypertension Mother     Stroke Paternal Uncle         Immunization History   Administered Date(s) Administered    COVID-19, MRNA, LN-S, PF (Pfizer) (Gray Cap) 2022    COVID-19, MRNA, LN-S, PF (Pfizer) (Purple Cap) 2022    Hepatitis A, Adult 10/25/2018    Hepatitis B, Adult 2019, 2019, 10/29/2019    Influenza - Quadrivalent - PF *Preferred* (6 months and older) 10/29/2019    Tdap 2020       Patient Care Team:  KATHARINE Cooney as PCP - General (Nurse Practitioner)  KATHARINE Urrutia (Family Medicine)     Subjective:     ROS    Constitutional:  Denies chills. Denies fever. Denies night sweats.  "Denies weight loss.   Ophthalmology: Denies blurred vision. Denies dry eyes. Denies eye pain. Denies Itching and redness.   ENT: Denies oral ulcers. Denies epistaxis. Denies dry mouth. Denies swollen glands.   Endocrine: Denies diabetes. Denies thyroid Problems.   Respiratory: Denies cough. Denies shortness of breath. Denies shortness of breath with exertion. Denies hemoptysis.   Cardiovascular: Denies chest pain at rest. Denies chest pain with exertion. Denies palpitations.    Gastrointestinal: Denies abdominal pain. Denies diarrhea. Denies nausea. Denies vomiting. Denies hematemesis or hematochezia. Denies heartburn.  Genitourinary: Denies blood in urine.  Musculoskeletal: See HPI for details  Integumentary: Denies rash. Denies photosensitivity.   Peripheral Vascular: Denies Ulcers of hands and/or feet. Denies Cold extremities.   Neurologic: Denies dizziness. Denies headache.  Denies loss of strength. Denies numbness or tingling.   Psychiatric: Denies depression. Denies anxiety. Denies suicidal/homicidal ideations.      Objective:     Visit Vitals  /82 (BP Location: Left arm, Patient Position: Sitting)   Pulse 74   Temp 97.8 °F (36.6 °C) (Oral)   Resp 16   Ht 5' 3" (1.6 m)   Wt 56.8 kg (125 lb 3.2 oz)   SpO2 97%   BMI 22.18 kg/m²       Physical Exam    General Appearance: alert, pleasant, in no acute distress.  Skin: Skin color, texture, turgor normal. No rashes or lesions.  Eyes:  extraocular movement intact (EOMI), pupils equal, round, reactive to light and accommodation, conjunctiva clear.  ENT: No oral or nasal ulcers.  Neck:  Neck supple. No adenopathy.   Lungs: CTA bilaterally without crackles, rhonchi, or wheezes.   Heart: RRR w/o murmurs.  No edema. 2+ DP/TP pulse.  Abdomen: Soft, non-tender, no masses, rebound or guarding.  Neuro: Alert, oriented, CN II-XII GI, sensory and motor innervation intact.  Musculoskeletal: Bilateral hand deformity with degenerative changes, Heberden's nodules noted with " bilateral CMC squaring, no active synovitis noted to bilateral MCPs. No pain to bilateral MTPs. No swelling or pain to bilateral knees, FROM noted. No swelling or pain noted to bilateral ankles. Good ROM of shoulders and no tenderness with ROM.  Psych: Alert, oriented, normal eye contact.      Labs Reviewed:     Rheumatology:  No results found for: ANAHS, ANATIT, ANAPAT, C3, C4, DNADSAB, CCPANTIBODIE, ODYU23VS, INTER, HSCRP, SEDRATE, LABURIC     Chemistry:  Lab Results   Component Value Date     07/13/2022    K 4.3 07/13/2022    CHLORIDE 105 07/13/2022    BUN 9.0 (L) 07/13/2022    CREATININE 0.64 07/13/2022    GLUCOSE 77 (L) 07/13/2022    CALCIUM 8.8 07/13/2022    ALKPHOS 170 (H) 07/13/2022    LABPROT 7.9 (H) 07/13/2022    ALBUMIN 3.0 (L) 07/13/2022    BILIDIR 0.6 01/28/2022    IBILI 0.70 01/28/2022    AST 50 (H) 07/13/2022    ALT 25 07/13/2022    KDNMTSZO31HW 26.6 (L) 07/13/2022        Hematology:  Lab Results   Component Value Date    WBC 2.7 (L) 07/13/2022    HGB 12.1 07/13/2022    HCT 38.2 07/13/2022     (L) 07/13/2022        Urine:  Lab Results   Component Value Date    COLORUA Yellow 12/08/2022    APPEARANCEUA Turbid (A) 12/08/2022    SGUA 1.023 12/08/2022    PHUA 6.0 12/08/2022    PROTEINUA 1+ (A) 12/08/2022    GLUCOSEUA Normal 12/08/2022    KETONESUA Trace (A) 12/08/2022    BLOODUA 2+ (A) 12/08/2022    NITRITESUA Negative 12/08/2022    LEUKOCYTESUR 25 (A) 12/08/2022    RBCUA 6-10 (A) 12/08/2022    WBCUA 0-5 12/08/2022    BACTERIA None Seen 12/08/2022    SQEPUA Few (A) 12/08/2022    HYALINECASTS None Seen 12/08/2022    CREATRANDUR 286.0 07/17/2018        Lab Results   Component Value Date    CREATRANDUR 286.0 07/17/2018        Imaging:       Assessment:       ICD-10-CM ICD-9-CM   1. Hypertension, unspecified type  I10 401.9   2. Chronic hepatitis C without hepatic coma  B18.2 070.54   3. Compensated HCV cirrhosis  K74.69 571.5    B19.20 070.54   4. Gastroesophageal reflux disease, unspecified  whether esophagitis present  K21.9 530.81   5. Neutropenia, unspecified type  D70.9 288.00   6. Thrombocytopenia  D69.6 287.5   7. Bilateral hand pain  M79.641 729.5    M79.642    8. Chronic pain of both knees  M25.561 719.46    M25.562 338.29    G89.29    9. Pain in both feet  M79.671 729.5    M79.672    10. Other seasonal allergic rhinitis  J30.2 477.8        Plan:   1. Bilateral hand pain:   -Bilateral hand deformity with degenerative changes, Heberden's nodules noted with bilateral CMC squaring, no active synovitis noted to bilateral MCPs. No pain to bilateral MTPs. No swelling or pain to bilateral knees, FROM noted. No swelling or pain noted to bilateral ankles. Appears more OA as there is no inflammation noted on exam.   -She was referred here for multi-site joint pain and + RF and CCP. Positive serologies possibly due to history of Hep C along with history of tobacco use.   -Lab  today and Xrays for further eval.  -Voltaren 2% topically to areas 2-3 times a day prn   -Heat to area along with stretches and exercises.     2. Hypertension, unspecified type  -Followed by PCP, stable    3. Chronic pain of both knees, likely form DJD, normal exam.  -Xrays for further eval     4. Chronic hepatitis C without hepatic coma and liver cirrhosis  -Followed by GI. History of hepatitis-C treated with Epclusa achieved SVR12 in September of 2020. Hep C qunt not detected in 9/2021.    5. Gastroesophageal reflux disease, unspecified whether esophagitis present  -Continue meds as directed along with diet modifications    6. Neutropenia and thrombocytopenia. Likely secondary to cirrhosis.  -Lab monitoring      Follow up if symptoms worsen or fail to improve. In addition to their scheduled follow up, the patient has also been instructed to follow up on as needed basis.      Total time spent with patient and documentation is more than 60 minutes. All questions were answered to patient's satisfaction and patient verbalized  understanding.       Orders Placed This Encounter   Procedures    X-Ray Foot Complete Left     Standing Status:   Future     Standing Expiration Date:   2/1/2024     Order Specific Question:   May the Radiologist modify the order per protocol to meet the clinical needs of the patient?     Answer:   Yes     Order Specific Question:   Release to patient     Answer:   Immediate    X-Ray Foot Complete Right     Standing Status:   Future     Standing Expiration Date:   2/1/2024     Order Specific Question:   May the Radiologist modify the order per protocol to meet the clinical needs of the patient?     Answer:   Yes     Order Specific Question:   Release to patient     Answer:   Immediate    X-Ray Hand 3 view Left     Standing Status:   Future     Standing Expiration Date:   2/1/2024     Order Specific Question:   May the Radiologist modify the order per protocol to meet the clinical needs of the patient?     Answer:   Yes     Order Specific Question:   Release to patient     Answer:   Immediate    X-Ray Hand 3 view Right     Standing Status:   Future     Standing Expiration Date:   2/1/2024     Order Specific Question:   May the Radiologist modify the order per protocol to meet the clinical needs of the patient?     Answer:   Yes     Order Specific Question:   Release to patient     Answer:   Immediate    X-Ray Knee 1 or 2 View Left     Standing Status:   Future     Standing Expiration Date:   2/1/2024     Order Specific Question:   May the Radiologist modify the order per protocol to meet the clinical needs of the patient?     Answer:   Yes     Order Specific Question:   Release to patient     Answer:   Immediate    X-Ray Knee 1 or 2 View Right     Standing Status:   Future     Standing Expiration Date:   2/1/2024     Order Specific Question:   May the Radiologist modify the order per protocol to meet the clinical needs of the patient?     Answer:   Yes     Order Specific Question:   Release to patient     Answer:    Immediate    CBC Auto Differential     Standing Status:   Future     Standing Expiration Date:   4/1/2024    Comprehensive Metabolic Panel     Standing Status:   Future     Standing Expiration Date:   4/1/2024    C-Reactive Protein     Standing Status:   Future     Standing Expiration Date:   4/1/2024    Sedimentation rate     Standing Status:   Future     Standing Expiration Date:   4/1/2024    Rheumatoid Factors, IgA, IgG, IgM     Standing Status:   Future     Standing Expiration Date:   4/1/2024    CYCLIC CITRULLINATED PEPTIDE (CCP) ANTIBODY     Standing Status:   Future     Standing Expiration Date:   4/1/2024        KATHARINE Joseph

## 2023-02-01 ENCOUNTER — TELEPHONE (OUTPATIENT)
Dept: RHEUMATOLOGY | Facility: CLINIC | Age: 68
End: 2023-02-01

## 2023-02-01 ENCOUNTER — OFFICE VISIT (OUTPATIENT)
Dept: RHEUMATOLOGY | Facility: CLINIC | Age: 68
End: 2023-02-01
Payer: MEDICARE

## 2023-02-01 ENCOUNTER — HOSPITAL ENCOUNTER (OUTPATIENT)
Dept: RADIOLOGY | Facility: HOSPITAL | Age: 68
Discharge: HOME | End: 2023-02-01
Attending: INTERNAL MEDICINE
Payer: MEDICARE

## 2023-02-01 VITALS
TEMPERATURE: 98 F | OXYGEN SATURATION: 97 % | HEIGHT: 63 IN | WEIGHT: 125.19 LBS | SYSTOLIC BLOOD PRESSURE: 129 MMHG | HEART RATE: 74 BPM | RESPIRATION RATE: 16 BRPM | DIASTOLIC BLOOD PRESSURE: 82 MMHG | BODY MASS INDEX: 22.18 KG/M2

## 2023-02-01 DIAGNOSIS — M25.561 CHRONIC PAIN OF BOTH KNEES: ICD-10-CM

## 2023-02-01 DIAGNOSIS — M79.671 PAIN IN BOTH FEET: ICD-10-CM

## 2023-02-01 DIAGNOSIS — M79.641 BILATERAL HAND PAIN: ICD-10-CM

## 2023-02-01 DIAGNOSIS — G89.29 CHRONIC PAIN OF BOTH KNEES: ICD-10-CM

## 2023-02-01 DIAGNOSIS — R76.8 POSITIVE ANA (ANTINUCLEAR ANTIBODY): ICD-10-CM

## 2023-02-01 DIAGNOSIS — D69.6 THROMBOCYTOPENIA: ICD-10-CM

## 2023-02-01 DIAGNOSIS — M79.672 PAIN IN BOTH FEET: ICD-10-CM

## 2023-02-01 DIAGNOSIS — K74.69 COMPENSATED HCV CIRRHOSIS: ICD-10-CM

## 2023-02-01 DIAGNOSIS — M25.562 CHRONIC PAIN OF BOTH KNEES: ICD-10-CM

## 2023-02-01 DIAGNOSIS — M79.641 BILATERAL HAND PAIN: Primary | ICD-10-CM

## 2023-02-01 DIAGNOSIS — M79.642 BILATERAL HAND PAIN: ICD-10-CM

## 2023-02-01 DIAGNOSIS — M79.642 BILATERAL HAND PAIN: Primary | ICD-10-CM

## 2023-02-01 DIAGNOSIS — D70.9 NEUTROPENIA, UNSPECIFIED TYPE: ICD-10-CM

## 2023-02-01 DIAGNOSIS — J30.2 OTHER SEASONAL ALLERGIC RHINITIS: ICD-10-CM

## 2023-02-01 DIAGNOSIS — K21.9 GASTROESOPHAGEAL REFLUX DISEASE, UNSPECIFIED WHETHER ESOPHAGITIS PRESENT: ICD-10-CM

## 2023-02-01 DIAGNOSIS — B19.20 COMPENSATED HCV CIRRHOSIS: ICD-10-CM

## 2023-02-01 DIAGNOSIS — I10 HYPERTENSION, UNSPECIFIED TYPE: ICD-10-CM

## 2023-02-01 DIAGNOSIS — B18.2 CHRONIC HEPATITIS C WITHOUT HEPATIC COMA: ICD-10-CM

## 2023-02-01 PROCEDURE — 3008F PR BODY MASS INDEX (BMI) DOCUMENTED: ICD-10-PCS | Mod: CPTII,,, | Performed by: INTERNAL MEDICINE

## 2023-02-01 PROCEDURE — 73560 X-RAY EXAM OF KNEE 1 OR 2: CPT | Mod: TC,RT

## 2023-02-01 PROCEDURE — 3079F PR MOST RECENT DIASTOLIC BLOOD PRESSURE 80-89 MM HG: ICD-10-PCS | Mod: CPTII,,, | Performed by: INTERNAL MEDICINE

## 2023-02-01 PROCEDURE — 73630 X-RAY EXAM OF FOOT: CPT | Mod: TC,RT

## 2023-02-01 PROCEDURE — 1125F PR PAIN SEVERITY QUANTIFIED, PAIN PRESENT: ICD-10-PCS | Mod: CPTII,,, | Performed by: INTERNAL MEDICINE

## 2023-02-01 PROCEDURE — 1125F AMNT PAIN NOTED PAIN PRSNT: CPT | Mod: CPTII,,, | Performed by: INTERNAL MEDICINE

## 2023-02-01 PROCEDURE — 1101F PT FALLS ASSESS-DOCD LE1/YR: CPT | Mod: CPTII,,, | Performed by: INTERNAL MEDICINE

## 2023-02-01 PROCEDURE — 73130 X-RAY EXAM OF HAND: CPT | Mod: TC,LT

## 2023-02-01 PROCEDURE — 3008F BODY MASS INDEX DOCD: CPT | Mod: CPTII,,, | Performed by: INTERNAL MEDICINE

## 2023-02-01 PROCEDURE — 3074F PR MOST RECENT SYSTOLIC BLOOD PRESSURE < 130 MM HG: ICD-10-PCS | Mod: CPTII,,, | Performed by: INTERNAL MEDICINE

## 2023-02-01 PROCEDURE — 3288F PR FALLS RISK ASSESSMENT DOCUMENTED: ICD-10-PCS | Mod: CPTII,,, | Performed by: INTERNAL MEDICINE

## 2023-02-01 PROCEDURE — 99205 OFFICE O/P NEW HI 60 MIN: CPT | Mod: S$PBB,,, | Performed by: INTERNAL MEDICINE

## 2023-02-01 PROCEDURE — 1159F PR MEDICATION LIST DOCUMENTED IN MEDICAL RECORD: ICD-10-PCS | Mod: CPTII,,, | Performed by: INTERNAL MEDICINE

## 2023-02-01 PROCEDURE — 3288F FALL RISK ASSESSMENT DOCD: CPT | Mod: CPTII,,, | Performed by: INTERNAL MEDICINE

## 2023-02-01 PROCEDURE — 73630 X-RAY EXAM OF FOOT: CPT | Mod: TC,LT

## 2023-02-01 PROCEDURE — 1159F MED LIST DOCD IN RCRD: CPT | Mod: CPTII,,, | Performed by: INTERNAL MEDICINE

## 2023-02-01 PROCEDURE — 99215 OFFICE O/P EST HI 40 MIN: CPT | Mod: PBBFAC | Performed by: INTERNAL MEDICINE

## 2023-02-01 PROCEDURE — 1101F PR PT FALLS ASSESS DOC 0-1 FALLS W/OUT INJ PAST YR: ICD-10-PCS | Mod: CPTII,,, | Performed by: INTERNAL MEDICINE

## 2023-02-01 PROCEDURE — 99205 PR OFFICE/OUTPT VISIT, NEW, LEVL V, 60-74 MIN: ICD-10-PCS | Mod: S$PBB,,, | Performed by: INTERNAL MEDICINE

## 2023-02-01 PROCEDURE — 3074F SYST BP LT 130 MM HG: CPT | Mod: CPTII,,, | Performed by: INTERNAL MEDICINE

## 2023-02-01 PROCEDURE — 73560 X-RAY EXAM OF KNEE 1 OR 2: CPT | Mod: TC,LT

## 2023-02-01 PROCEDURE — 73130 X-RAY EXAM OF HAND: CPT | Mod: TC,RT

## 2023-02-01 PROCEDURE — 3079F DIAST BP 80-89 MM HG: CPT | Mod: CPTII,,, | Performed by: INTERNAL MEDICINE

## 2023-02-01 RX ORDER — DICLOFENAC SODIUM 10 MG/G
GEL TOPICAL
COMMUNITY
Start: 2022-12-28

## 2023-02-01 RX ORDER — IBUPROFEN 800 MG/1
TABLET ORAL
COMMUNITY
Start: 2022-09-07 | End: 2023-02-01 | Stop reason: ALTCHOICE

## 2023-02-01 RX ORDER — MULTIVITAMIN
1 TABLET ORAL DAILY
COMMUNITY

## 2023-02-01 RX ORDER — PREDNISONE 20 MG/1
20 TABLET ORAL
COMMUNITY
Start: 2022-10-13 | End: 2023-02-01 | Stop reason: ALTCHOICE

## 2023-02-01 RX ORDER — FAMOTIDINE 20 MG/1
20 TABLET, FILM COATED ORAL
COMMUNITY
Start: 2022-03-23 | End: 2023-02-01 | Stop reason: ALTCHOICE

## 2023-02-01 RX ORDER — LEVOCETIRIZINE DIHYDROCHLORIDE 5 MG/1
5 TABLET, FILM COATED ORAL
COMMUNITY
Start: 2022-09-09 | End: 2023-02-01 | Stop reason: ALTCHOICE

## 2023-02-01 RX ORDER — HYDROXYZINE HYDROCHLORIDE 50 MG/1
50 TABLET, FILM COATED ORAL EVERY 6 HOURS PRN
COMMUNITY
Start: 2022-10-13 | End: 2023-02-01 | Stop reason: ALTCHOICE

## 2023-02-01 RX ORDER — FLUTICASONE PROPIONATE 50 MCG
SPRAY, SUSPENSION (ML) NASAL
COMMUNITY
Start: 2022-03-23 | End: 2023-02-01 | Stop reason: ALTCHOICE

## 2023-02-01 RX ORDER — METHOCARBAMOL 750 MG/1
TABLET, FILM COATED ORAL
COMMUNITY
Start: 2022-09-07 | End: 2023-02-01 | Stop reason: ALTCHOICE

## 2023-02-14 NOTE — TELEPHONE ENCOUNTER
Received a refill request through the fax que for potassium    LOV: 08/03/22  NOV: 02/27/23  No Shows: 01/31/23  Cancellations: 02/02/23, 12/12/22, 11/21/22

## 2023-02-15 RX ORDER — POTASSIUM CHLORIDE 750 MG/1
10 TABLET, EXTENDED RELEASE ORAL DAILY
Qty: 90 TABLET | Refills: 0 | Status: SHIPPED | OUTPATIENT
Start: 2023-02-15 | End: 2023-02-27 | Stop reason: SDUPTHER

## 2023-02-27 ENCOUNTER — OFFICE VISIT (OUTPATIENT)
Dept: INTERNAL MEDICINE | Facility: CLINIC | Age: 68
End: 2023-02-27
Payer: MEDICARE

## 2023-02-27 VITALS
TEMPERATURE: 98 F | SYSTOLIC BLOOD PRESSURE: 122 MMHG | HEART RATE: 75 BPM | WEIGHT: 129.38 LBS | DIASTOLIC BLOOD PRESSURE: 74 MMHG | BODY MASS INDEX: 22.93 KG/M2 | HEIGHT: 63 IN | RESPIRATION RATE: 16 BRPM

## 2023-02-27 DIAGNOSIS — Z12.31 BREAST CANCER SCREENING BY MAMMOGRAM: ICD-10-CM

## 2023-02-27 DIAGNOSIS — E55.9 VITAMIN D DEFICIENCY: ICD-10-CM

## 2023-02-27 DIAGNOSIS — F32.A ANXIETY AND DEPRESSION: ICD-10-CM

## 2023-02-27 DIAGNOSIS — Z12.11 COLON CANCER SCREENING: ICD-10-CM

## 2023-02-27 DIAGNOSIS — Z11.4 ENCOUNTER FOR SCREENING FOR HIV: ICD-10-CM

## 2023-02-27 DIAGNOSIS — I10 HYPERTENSION, UNSPECIFIED TYPE: ICD-10-CM

## 2023-02-27 DIAGNOSIS — D69.6 THROMBOCYTOPENIA, UNSPECIFIED: ICD-10-CM

## 2023-02-27 DIAGNOSIS — B19.20 COMPENSATED HCV CIRRHOSIS: ICD-10-CM

## 2023-02-27 DIAGNOSIS — F41.9 ANXIETY AND DEPRESSION: ICD-10-CM

## 2023-02-27 DIAGNOSIS — F17.200 NICOTINE DEPENDENCE, UNCOMPLICATED, UNSPECIFIED NICOTINE PRODUCT TYPE: ICD-10-CM

## 2023-02-27 DIAGNOSIS — K74.69 COMPENSATED HCV CIRRHOSIS: ICD-10-CM

## 2023-02-27 PROCEDURE — 3288F FALL RISK ASSESSMENT DOCD: CPT | Mod: CPTII,,, | Performed by: NURSE PRACTITIONER

## 2023-02-27 PROCEDURE — 1160F PR REVIEW ALL MEDS BY PRESCRIBER/CLIN PHARMACIST DOCUMENTED: ICD-10-PCS | Mod: CPTII,,, | Performed by: NURSE PRACTITIONER

## 2023-02-27 PROCEDURE — 1126F PR PAIN SEVERITY QUANTIFIED, NO PAIN PRESENT: ICD-10-PCS | Mod: CPTII,,, | Performed by: NURSE PRACTITIONER

## 2023-02-27 PROCEDURE — 99214 PR OFFICE/OUTPT VISIT, EST, LEVL IV, 30-39 MIN: ICD-10-PCS | Mod: S$PBB,25,, | Performed by: NURSE PRACTITIONER

## 2023-02-27 PROCEDURE — 3078F PR MOST RECENT DIASTOLIC BLOOD PRESSURE < 80 MM HG: ICD-10-PCS | Mod: CPTII,,, | Performed by: NURSE PRACTITIONER

## 2023-02-27 PROCEDURE — 3078F DIAST BP <80 MM HG: CPT | Mod: CPTII,,, | Performed by: NURSE PRACTITIONER

## 2023-02-27 PROCEDURE — 1126F AMNT PAIN NOTED NONE PRSNT: CPT | Mod: CPTII,,, | Performed by: NURSE PRACTITIONER

## 2023-02-27 PROCEDURE — 1159F MED LIST DOCD IN RCRD: CPT | Mod: CPTII,,, | Performed by: NURSE PRACTITIONER

## 2023-02-27 PROCEDURE — 3008F BODY MASS INDEX DOCD: CPT | Mod: CPTII,,, | Performed by: NURSE PRACTITIONER

## 2023-02-27 PROCEDURE — 3074F SYST BP LT 130 MM HG: CPT | Mod: CPTII,,, | Performed by: NURSE PRACTITIONER

## 2023-02-27 PROCEDURE — 1101F PR PT FALLS ASSESS DOC 0-1 FALLS W/OUT INJ PAST YR: ICD-10-PCS | Mod: CPTII,,, | Performed by: NURSE PRACTITIONER

## 2023-02-27 PROCEDURE — 1101F PT FALLS ASSESS-DOCD LE1/YR: CPT | Mod: CPTII,,, | Performed by: NURSE PRACTITIONER

## 2023-02-27 PROCEDURE — 99214 OFFICE O/P EST MOD 30 MIN: CPT | Mod: S$PBB,25,, | Performed by: NURSE PRACTITIONER

## 2023-02-27 PROCEDURE — 3074F PR MOST RECENT SYSTOLIC BLOOD PRESSURE < 130 MM HG: ICD-10-PCS | Mod: CPTII,,, | Performed by: NURSE PRACTITIONER

## 2023-02-27 PROCEDURE — 1159F PR MEDICATION LIST DOCUMENTED IN MEDICAL RECORD: ICD-10-PCS | Mod: CPTII,,, | Performed by: NURSE PRACTITIONER

## 2023-02-27 PROCEDURE — 99214 OFFICE O/P EST MOD 30 MIN: CPT | Mod: PBBFAC | Performed by: NURSE PRACTITIONER

## 2023-02-27 PROCEDURE — 99406 PR TOBACCO USE CESSATION INTERMEDIATE 3-10 MINUTES: ICD-10-PCS | Mod: S$PBB,,, | Performed by: NURSE PRACTITIONER

## 2023-02-27 PROCEDURE — 3008F PR BODY MASS INDEX (BMI) DOCUMENTED: ICD-10-PCS | Mod: CPTII,,, | Performed by: NURSE PRACTITIONER

## 2023-02-27 PROCEDURE — 3288F PR FALLS RISK ASSESSMENT DOCUMENTED: ICD-10-PCS | Mod: CPTII,,, | Performed by: NURSE PRACTITIONER

## 2023-02-27 PROCEDURE — 99406 BEHAV CHNG SMOKING 3-10 MIN: CPT | Mod: S$PBB,,, | Performed by: NURSE PRACTITIONER

## 2023-02-27 PROCEDURE — 1160F RVW MEDS BY RX/DR IN RCRD: CPT | Mod: CPTII,,, | Performed by: NURSE PRACTITIONER

## 2023-02-27 RX ORDER — HYDROXYZINE PAMOATE 25 MG/1
CAPSULE ORAL
Qty: 45 CAPSULE | Refills: 2 | Status: SHIPPED | OUTPATIENT
Start: 2023-02-27 | End: 2023-06-02 | Stop reason: SDUPTHER

## 2023-02-27 RX ORDER — POTASSIUM CHLORIDE 750 MG/1
10 TABLET, EXTENDED RELEASE ORAL DAILY
Qty: 90 TABLET | Refills: 1 | Status: SHIPPED | OUTPATIENT
Start: 2023-02-27 | End: 2023-08-21 | Stop reason: SDUPTHER

## 2023-02-27 RX ORDER — FLUOXETINE HYDROCHLORIDE 40 MG/1
40 CAPSULE ORAL DAILY
Qty: 90 CAPSULE | Refills: 1 | Status: SHIPPED | OUTPATIENT
Start: 2023-02-27 | End: 2023-08-21 | Stop reason: SDUPTHER

## 2023-02-27 NOTE — PROGRESS NOTES
Internal Medicine Clinic  KATHARINE Cooney     Patient Name: Paula Vilchis   : 1955  MRN:[id number]     Chief Complaint     Chief Complaint   Patient presents with    Follow-up     Lab results        History of Present Illness     67 year old AAF, presents in clinic for lab review. PMH HTN, HEP C Treated , Liver Cirrhosis, Neutropenia, thrombocytopenia (likely from hep Cirrhosis), mixed hearing loss, positive RF & CCP, tobacco user (Smokes 1-2 cigars per day), alcohol use, daily marijuana use, stuttering.   Previously with ID clinic, finished 12 week course of Epclusa on 2020, undetectable viral load. Following [facility] GI clinic for MELD-Na score 12 Child-Layton Class B (score 7). Pt is completely asymptomatic at this time. ABD US 2022 Coarse heterogeneous and nodular appearing liver consistent the patient's history of cirrhosis. Status post cholecystectomy  Reports occasionally using Aleve for joint pain. She is drinking wine again, ~2 times per week.     Prior ECHO (abd US 2020; displayed Prominent inferior vena cava measuring up to 2.9 cm in AP diameter at the level of the liver.  Repeat ECHO performed on 2021, and displayed EF 55%, grade 1 diastolic dysfunction, Aortic valve sclerosis without stenosis, mild to moderate concentric left ventricular hypertrophy, mild mitral and tricuspid regurgitation. Referred to [facility] Cardio clinic 10/26/2021, apt pending. Following Dr. Mon Rheum for +RF, +CCP and multiple joint pain. Denies CP, SOB, HA, easy bruising, bleeding, epistaxis, gum bleeding, GI/ bleeding, bone pain, night sweats, Vomiting, diarrhea, constipation, tammy colored stools, fatigue, weakness, abd pain, fever, icterus.     EGD normal 2022 Dr. Cavazos. Repeat EGD .  Following [facility] GI clinic, Dr. Segovia, next apt scheduled 3/6/2023  HEP C treatment; 12 weeks of Epclusa. She completed tx 2020.   DEXA 11/10/2021 normal.  MMG 2022: BIRADS 1  FIT neg  "4/5/2021  [facility] 4/5/2021; PAP; NIL               Review of Systems     Review of Systems   Constitutional: Negative.    HENT: Negative.     Eyes: Negative.    Respiratory: Negative.     Cardiovascular: Negative.    Gastrointestinal: Negative.    Endocrine: Negative.    Genitourinary: Negative.    Musculoskeletal: Negative.    Integumentary:  Negative.   Allergic/Immunologic: Negative.    Neurological: Negative.    Hematological: Negative.    Psychiatric/Behavioral: Negative.     All other systems reviewed and are negative.     Physical Examination     Visit Vitals  /74 (BP Location: Left arm, Patient Position: Sitting, BP Method: Medium (Automatic))   Pulse 75   Temp 97.8 °F (36.6 °C)   Resp 16   Ht 5' 3" (1.6 m)   Wt 58.7 kg (129 lb 6.4 oz)   BMI 22.92 kg/m²      Wt Readings from Last 3 Encounters:   02/27/23 0823 58.7 kg (129 lb 6.4 oz)   02/01/23 0813 56.8 kg (125 lb 3.2 oz)   08/29/22 1342 60.1 kg (132 lb 9.6 oz)          Physical Exam  Vitals and nursing note reviewed.   Constitutional:       Appearance: Normal appearance.   HENT:      Head: Normocephalic and atraumatic.      Right Ear: Tympanic membrane, ear canal and external ear normal.      Left Ear: Tympanic membrane, ear canal and external ear normal.      Nose: Nose normal.      Mouth/Throat:      Mouth: Mucous membranes are moist.      Pharynx: Oropharynx is clear.   Eyes:      Extraocular Movements: Extraocular movements intact.      Conjunctiva/sclera: Conjunctivae normal.      Pupils: Pupils are equal, round, and reactive to light.   Cardiovascular:      Rate and Rhythm: Normal rate and regular rhythm.      Pulses: Normal pulses.      Heart sounds: Normal heart sounds.   Pulmonary:      Effort: Pulmonary effort is normal.      Breath sounds: Normal breath sounds.   Abdominal:      General: Abdomen is flat. Bowel sounds are normal.      Palpations: Abdomen is soft.   Musculoskeletal:         General: Normal range of motion.      Cervical " back: Normal range of motion and neck supple.   Skin:     General: Skin is warm and dry.      Capillary Refill: Capillary refill takes less than 2 seconds.   Neurological:      General: No focal deficit present.      Mental Status: She is alert and oriented to person, place, and time. Mental status is at baseline.   Psychiatric:         Mood and Affect: Mood normal.         Behavior: Behavior normal.         Thought Content: Thought content normal.         Judgment: Judgment normal.        Labs / Imaging     Chemistry:  Lab Results   Component Value Date     02/01/2023    K 4.2 02/01/2023    CHLORIDE 106 02/01/2023    BUN 10.7 02/01/2023    CREATININE 0.72 02/01/2023    EGFRNORACEVR >60 02/01/2023    GLUCOSE 83 02/01/2023    CALCIUM 9.6 02/01/2023    ALKPHOS 190 (H) 02/01/2023    LABPROT 8.6 (H) 02/01/2023    ALBUMIN 3.5 02/01/2023    BILIDIR 0.6 01/28/2022    IBILI 0.70 01/28/2022    AST 53 (H) 02/01/2023    ALT 30 02/01/2023    SWYFXITD39NB 34.8 02/01/2023        Lab Results   Component Value Date    HGBA1C 4.2 07/17/2018        Hematology:  Lab Results   Component Value Date    WBC 2.5 (L) 02/01/2023    RBC 4.54 02/01/2023    HGB 12.9 02/01/2023    HCT 39.1 02/01/2023    MCV 86.1 02/01/2023    MCH 28.4 02/01/2023    MCHC 33.0 02/01/2023    RDW 15.9 02/01/2023    PLT 94 (L) 02/01/2023    MPV 10.2 02/01/2023        Lipid Panel:  Lab Results   Component Value Date    CHOL 156 02/01/2023    HDL 53 02/01/2023    LDL 90.00 02/01/2023    TRIG 65 02/01/2023    TOTALCHOLEST 3 02/01/2023        Urine:  Lab Results   Component Value Date    COLORUA Yellow 02/01/2023    APPEARANCEUA Clear 02/01/2023    SGUA 1.016 02/01/2023    PHUA 7.0 02/01/2023    PROTEINUA Negative 02/01/2023    GLUCOSEUA Normal 02/01/2023    KETONESUA Negative 02/01/2023    BLOODUA Negative 02/01/2023    NITRITESUA Negative 02/01/2023    LEUKOCYTESUR 25 (A) 02/01/2023    RBCUA 0-5 02/01/2023    WBCUA 0-5 02/01/2023    BACTERIA None Seen 02/01/2023     SQEPUA Occ (A) 02/01/2023    HYALINECASTS None Seen 02/01/2023    CREATRANDUR 102.5 02/01/2023    PROTEINURINE 12.5 02/01/2023    UPROTCREA 0.1 02/01/2023          Assessment       ICD-10-CM ICD-9-CM   1. Hypertension, unspecified type  I10 401.9   2. Compensated HCV cirrhosis  K74.69 571.5    B19.20 070.54   3. Thrombocytopenia, unspecified  D69.6 287.5   4. Anxiety and depression  F41.9 300.00    F32.A 311   5. Vitamin D deficiency  E55.9 268.9   6. Breast cancer screening by mammogram  Z12.31 V76.12   7. Nicotine dependence, uncomplicated, unspecified nicotine product type  F17.200 305.1   8. BMI 22.0-22.9, adult  Z68.22 V85.1   9. Colon cancer screening  Z12.11 V76.51   10. Encounter for screening for HIV  Z11.4 V73.89        Plan   1. Hypertension, unspecified type  BP and HR stable.   DASH diet: Eat more fruits, vegetables, and low fat dairy foods.  (Less than 2 grams of sodium per day).  Maintain healthy weight with goal BMI <30.   Exercise 30 minutes per day 5 days per week.  Home medications refilled and continued.   Home BP monitoring encouraged with BP parameters given.    - CBC Auto Differential; Future  - Comprehensive Metabolic Panel; Future  - Lipid Panel; Future  - TSH; Future  - Urinalysis; Future  - Urinalysis    2. Compensated HCV cirrhosis  Following Dr. Segovia GI  - CBC Auto Differential; Future  - Comprehensive Metabolic Panel; Future  - Lipid Panel; Future  - Urinalysis; Future  - Urinalysis    3. Thrombocytopenia, unspecified  Likely secondary to cirrhosis.  -Lab monitoring  - CBC Auto Differential; Future  - Comprehensive Metabolic Panel; Future  - Lipid Panel; Future  - Urinalysis; Future  - Urinalysis    4. Anxiety and depression  Mood stable  Med refills  Practice deep breathing or abdominal breathing exercises when anxiety occurs.  Exercise daily. Get sunlight daily.  Avoid caffeine, alcohol and stimulants.  Practice positive phrases and repeat throughout the day, yoga, lavender  scents or Chamomile tea will help anxiety.  Set healthy boundaries, avoid people and conversations that increase stress.  Reports any symptoms of suicidal or homicidal ideations immediately, if clinic is closed go to nearest emergency room.     5. Vitamin D deficiency  Vitamin D level reviewed and is currently at goal, between 30-80 ng/mL. Continue OTC Vitamin D3 2000 IU daily.   - Vitamin D; Future    6. Breast cancer screening by mammogram    - Mammo Digital Screening Bilat w/ Russell; Future    7. Nicotine dependence, uncomplicated, unspecified nicotine product type  Smoking cessation advised. Instructed on smoking cessation program through [facility] and pharmacological interventions to aid in cessation.  >5 minutes allotted to educate patient on the harms of smoking, the urgency to quit, and the development of a plan for smoking cessation.     8. BMI 22.0-22.9, adult  Goal BMI <30.  Exercise 5 times a week for 30 minutes per day.  Avoid soda, simple sugars, excessive rice, potatoes or bread. Limit fast foods and fried foods.  Choose complex carbs in moderation (example: green vegetables, beans, oatmeal). Eat plenty of fresh fruits and vegetables with lean meats daily.  Do not skip meals. Eat a balanced portion size.  Avoid fad diets. Consider permanent healthy life style changes.       9. Colon cancer screening    - Cologuard Screening (Multitarget Stool DNA); Future  - Cologuard Screening (Multitarget Stool DNA)    10. Encounter for screening for HIV    - HIV 1/2 Ag/Ab (4th Gen); Future        Current Outpatient Medications   Medication Instructions    clotrimazole (LOTRIMIN) 1 % cream Topical (Top), 2 times daily    diclofenac sodium (VOLTAREN) 1 % Gel SMARTSI Gram(s) Topical 4 Times Daily PRN    FLUoxetine 40 mg, Oral, Daily    hydrOXYzine pamoate (VISTARIL) 25 MG Cap TAKE ONE CAPSULE BY MOUTH 4 TIMES A DAY AS NEEDED FOR ANXIETY    multivitamin (THERAGRAN) per tablet 1 tablet, Oral, Daily    pantoprazole (PROTONIX)  40 mg, Oral, Daily    potassium chloride SA (K-DUR,KLOR-CON M) 10 MEQ tablet 10 mEq, Oral, Daily       Future Appointments   Date Time Provider Department Center   3/1/2023 10:00 AM KATHARINE Cody [facility] ENT [place]   3/6/2023  1:00 PM Aime Segovia MD [facility] GASTRO [place]   4/6/2023  2:30 PM Colleen Mon MD [facility] RHEU [facility]   6/27/2023  7:45 AM KATHARINE Cooney [facility] INTMED [place]    7/10/2023  8:30 AM JEREMY Thomas [facility] GYN [place]        Follow up in about 4 months (around 6/27/2023) for lab review.    Labs thoroughly reviewed with patient. Medication refills addressed today.  RTC prn and 3-4 months, with labs 1 week prior to the apt.  COVID 19 precautions given to patient.  Patient voices understanding of all discharge instructions.      KATHARINE Cooney

## 2023-03-22 ENCOUNTER — OFFICE VISIT (OUTPATIENT)
Dept: OTOLARYNGOLOGY | Facility: CLINIC | Age: 68
End: 2023-03-22
Payer: MEDICARE

## 2023-03-22 VITALS
BODY MASS INDEX: 22.64 KG/M2 | SYSTOLIC BLOOD PRESSURE: 124 MMHG | HEART RATE: 73 BPM | TEMPERATURE: 98 F | WEIGHT: 127.81 LBS | HEIGHT: 63 IN | DIASTOLIC BLOOD PRESSURE: 76 MMHG

## 2023-03-22 DIAGNOSIS — H61.23 BILATERAL IMPACTED CERUMEN: ICD-10-CM

## 2023-03-22 DIAGNOSIS — H69.91 ETD (EUSTACHIAN TUBE DYSFUNCTION), RIGHT: ICD-10-CM

## 2023-03-22 DIAGNOSIS — H91.90 HEARING LOSS, UNSPECIFIED HEARING LOSS TYPE, UNSPECIFIED LATERALITY: Primary | ICD-10-CM

## 2023-03-22 PROCEDURE — 3288F PR FALLS RISK ASSESSMENT DOCUMENTED: ICD-10-PCS | Mod: CPTII,,, | Performed by: NURSE PRACTITIONER

## 2023-03-22 PROCEDURE — 3078F PR MOST RECENT DIASTOLIC BLOOD PRESSURE < 80 MM HG: ICD-10-PCS | Mod: CPTII,,, | Performed by: NURSE PRACTITIONER

## 2023-03-22 PROCEDURE — 99213 OFFICE O/P EST LOW 20 MIN: CPT | Mod: S$PBB,25,, | Performed by: NURSE PRACTITIONER

## 2023-03-22 PROCEDURE — 1126F PR PAIN SEVERITY QUANTIFIED, NO PAIN PRESENT: ICD-10-PCS | Mod: CPTII,,, | Performed by: NURSE PRACTITIONER

## 2023-03-22 PROCEDURE — 69210 REMOVE IMPACTED EAR WAX UNI: CPT | Mod: PBBFAC | Performed by: NURSE PRACTITIONER

## 2023-03-22 PROCEDURE — 3008F PR BODY MASS INDEX (BMI) DOCUMENTED: ICD-10-PCS | Mod: CPTII,,, | Performed by: NURSE PRACTITIONER

## 2023-03-22 PROCEDURE — 99213 PR OFFICE/OUTPT VISIT, EST, LEVL III, 20-29 MIN: ICD-10-PCS | Mod: S$PBB,25,, | Performed by: NURSE PRACTITIONER

## 2023-03-22 PROCEDURE — 99213 OFFICE O/P EST LOW 20 MIN: CPT | Mod: PBBFAC,25 | Performed by: NURSE PRACTITIONER

## 2023-03-22 PROCEDURE — 1126F AMNT PAIN NOTED NONE PRSNT: CPT | Mod: CPTII,,, | Performed by: NURSE PRACTITIONER

## 2023-03-22 PROCEDURE — 1101F PT FALLS ASSESS-DOCD LE1/YR: CPT | Mod: CPTII,,, | Performed by: NURSE PRACTITIONER

## 2023-03-22 PROCEDURE — 1159F PR MEDICATION LIST DOCUMENTED IN MEDICAL RECORD: ICD-10-PCS | Mod: CPTII,,, | Performed by: NURSE PRACTITIONER

## 2023-03-22 PROCEDURE — 3074F SYST BP LT 130 MM HG: CPT | Mod: CPTII,,, | Performed by: NURSE PRACTITIONER

## 2023-03-22 PROCEDURE — 69210 PR REMOVAL IMPACTED CERUMEN REQUIRING INSTRUMENTATION, UNILATERAL: ICD-10-PCS | Mod: S$PBB,,, | Performed by: NURSE PRACTITIONER

## 2023-03-22 PROCEDURE — 3078F DIAST BP <80 MM HG: CPT | Mod: CPTII,,, | Performed by: NURSE PRACTITIONER

## 2023-03-22 PROCEDURE — 3288F FALL RISK ASSESSMENT DOCD: CPT | Mod: CPTII,,, | Performed by: NURSE PRACTITIONER

## 2023-03-22 PROCEDURE — 1101F PR PT FALLS ASSESS DOC 0-1 FALLS W/OUT INJ PAST YR: ICD-10-PCS | Mod: CPTII,,, | Performed by: NURSE PRACTITIONER

## 2023-03-22 PROCEDURE — 69210 REMOVE IMPACTED EAR WAX UNI: CPT | Mod: S$PBB,,, | Performed by: NURSE PRACTITIONER

## 2023-03-22 PROCEDURE — 3008F BODY MASS INDEX DOCD: CPT | Mod: CPTII,,, | Performed by: NURSE PRACTITIONER

## 2023-03-22 PROCEDURE — 3074F PR MOST RECENT SYSTOLIC BLOOD PRESSURE < 130 MM HG: ICD-10-PCS | Mod: CPTII,,, | Performed by: NURSE PRACTITIONER

## 2023-03-22 PROCEDURE — 1159F MED LIST DOCD IN RCRD: CPT | Mod: CPTII,,, | Performed by: NURSE PRACTITIONER

## 2023-03-22 RX ORDER — FLUTICASONE PROPIONATE 50 MCG
2 SPRAY, SUSPENSION (ML) NASAL 2 TIMES DAILY
Qty: 16 G | Refills: 11 | Status: SHIPPED | OUTPATIENT
Start: 2023-03-22 | End: 2023-04-21

## 2023-03-22 NOTE — PROGRESS NOTES
[facility]  Otolaryngology Clinic Note    Paula Vilchis  YOB: 1955    Chief Complaint: HL    HPI: 3/23/22: Patient is here today as a referral for bilateral hearing loss, conductive component noted in the right. She has had it for over 2 weeks. No history of tinnitus, vertigo, prior ear surgery, acoustic trauma, head trauma, stroke, aural pressure, chronic middle ear disease, TM perforation. In addition she has a cold now for 2 weeks she has congestion and drainage. She also has a history of reflux which she takes no medicine, which she describes having a foreign body sensation in throat and constant clearing of her throat. She has no dysphagia, odynophagia, hoarseness or weight loss.    2023: 67yoF referred for hearing loss. Today she c/o tinnitus and she feels the her ears are clogged with wax. Denies otalgia or otorrhea. Her friend tells her she has trouble hearing.    ROS:   10-point review of systems negative except per HPI      Review of patient's allergies indicates:  No Known Allergies    Past Medical History:   Diagnosis Date    Compensated HCV cirrhosis     Depression     HTN (hypertension)     Hx of colonic polyp     Situational depression     Thrombocytopenia, unspecified     Tobacco user        Past Surgical History:   Procedure Laterality Date     SECTION       SECTION       SECTION       SECTION      Drainage of Foot Skin, External Approach Right 2017    Draingage of Foot Skin, External Approach Right 2017    HERNIA REPAIR      Incision and Drainage of Abscess; complicated or multiple N/A 2017    Incision and Drainage of Hematoma, seroma or fluid collection N/A 2017       Social History     Socioeconomic History    Marital status: Single    Number of children: 4   Occupational History    Occupation: Unemployed   Tobacco Use    Smoking status: Every Day     Types: Cigars    Smokeless tobacco: Never     "Tobacco comments:     3 Black N Mild cigar a day   Substance and Sexual Activity    Alcohol use: Yes     Alcohol/week: 3.0 standard drinks     Types: 3 Glasses of wine per week     Comment: Wine Occasionally    Drug use: Yes     Frequency: 4.0 times per week     Types: Marijuana    Sexual activity: Yes     Birth control/protection: Post-menopausal       Family History   Problem Relation Age of Onset    Diabetes Father     Hypertension Father     Cancer Mother     Hypertension Mother     Stroke Paternal Uncle        Outpatient Encounter Medications as of 3/22/2023   Medication Sig Dispense Refill    diclofenac sodium (VOLTAREN) 1 % Gel SMARTSI Gram(s) Topical 4 Times Daily PRN      FLUoxetine 40 MG capsule Take 1 capsule (40 mg total) by mouth once daily. 90 capsule 1    hydrOXYzine pamoate (VISTARIL) 25 MG Cap TAKE ONE CAPSULE BY MOUTH 4 TIMES A DAY AS NEEDED FOR ANXIETY 45 capsule 2    multivitamin (THERAGRAN) per tablet Take 1 tablet by mouth once daily.      pantoprazole (PROTONIX) 40 MG tablet Take 1 tablet (40 mg total) by mouth once daily. 90 tablet 3    potassium chloride SA (K-DUR,KLOR-CON M) 10 MEQ tablet Take 1 tablet (10 mEq total) by mouth once daily. 90 tablet 1    clotrimazole (LOTRIMIN) 1 % cream Apply topically 2 (two) times daily. for 14 days 28 g 0     No facility-administered encounter medications on file as of 3/22/2023.       Physical Exam:  Vitals:    23 1122   BP: 124/76   BP Location: Right arm   Patient Position: Sitting   BP Method: Medium (Automatic)   Pulse: 73   Temp: 97.8 °F (36.6 °C)   TempSrc: Oral   Weight: 58 kg (127 lb 12.8 oz)   Height: 5' 3" (1.6 m)       General: NAD, voice normal  Neuro: AAO, CN II - XII grossly intact  Head/ Face: NCAT, symmetric, sensations intact bilaterally  Eyes: EOMI, PERRL  Ears: externally normal with grossly normal hearing  AD: EAC with moderate amt of cerumen in lateral canal- atraumatically removed under microscopy with curette-  TM intact, " no middle ear effusion, + retracted  AS: EAC with moderate amt of cerumen in lateral canal- atraumatically removed under microscopy with curette- TM intact, no middle ear effusion, no retractions  Nose: bilateral nares patent, midline septum, no rhinorrhea, no external deformity, no turbinate hypertrophy  OC/OP: MMM, no intraoral lesions, FOM/BOT soft, no trismus, dentition is moderate, no uvular deviation, bilaterally symmetric soft palate elevation, palatoglossus and palatopharyngeal fold wnl; tonsils are symmetric and 1+  Indirect laryngoscopy: deferred due to patient intolerance  Neck: soft, supple, no LAD, normal ROM, no thyromegaly  Respiratory: nonlabored, no wheezing, bilateral chest rise  Cardiovascular: RRR  Gastrointestinal: S NT ND  Skin: warm, no lesions  Musculoskeletal: 5/5 strength  Psych: Appropriate affect/mood     Pertinent Data:  ? LABS:  ? AUDIO:             ? PATH:      Imaging:   I personally reviewed the following images:        Assessment/Plan:  67 y.o. female with cerumen impaction, PMH b/l HL, right ETD. Reinforced the cerumen in her canals was unlikely to be affecting her HL.  - Flonase BID  - Autoinsufflation  - Annual audio  - RTC 6 weeks     Iwona García NP

## 2023-03-24 LAB — NONINV COLON CA DNA+OCC BLD SCRN STL QL: NEGATIVE

## 2023-04-06 PROBLEM — R76.8 POSITIVE ANA (ANTINUCLEAR ANTIBODY): Status: ACTIVE | Noted: 2023-04-06

## 2023-04-06 PROBLEM — M05.79 RHEUMATOID ARTHRITIS INVOLVING MULTIPLE SITES WITH POSITIVE RHEUMATOID FACTOR: Status: ACTIVE | Noted: 2023-04-06

## 2023-04-06 PROBLEM — Z79.899 HIGH RISK MEDICATION USE: Status: ACTIVE | Noted: 2023-04-06

## 2023-04-19 ENCOUNTER — TELEPHONE (OUTPATIENT)
Dept: AUDIOLOGY | Facility: HOSPITAL | Age: 68
End: 2023-04-19
Payer: MEDICARE

## 2023-04-19 NOTE — TELEPHONE ENCOUNTER
Patient called to cancel audio appointment scheduled on 4/20/23. Patient stated she will call at a later date to reschedule appointment.

## 2023-05-22 ENCOUNTER — OFFICE VISIT (OUTPATIENT)
Dept: GASTROENTEROLOGY | Facility: CLINIC | Age: 68
End: 2023-05-22
Payer: MEDICARE

## 2023-05-22 VITALS
BODY MASS INDEX: 22.5 KG/M2 | TEMPERATURE: 97 F | HEART RATE: 95 BPM | DIASTOLIC BLOOD PRESSURE: 86 MMHG | HEIGHT: 63 IN | SYSTOLIC BLOOD PRESSURE: 157 MMHG | WEIGHT: 127 LBS | RESPIRATION RATE: 16 BRPM

## 2023-05-22 DIAGNOSIS — B19.20 COMPENSATED HCV CIRRHOSIS: Primary | ICD-10-CM

## 2023-05-22 DIAGNOSIS — K74.69 COMPENSATED HCV CIRRHOSIS: Primary | ICD-10-CM

## 2023-05-22 LAB — LIPASE SERPL-CCNC: 21 U/L

## 2023-05-22 PROCEDURE — 83690 ASSAY OF LIPASE: CPT | Performed by: STUDENT IN AN ORGANIZED HEALTH CARE EDUCATION/TRAINING PROGRAM

## 2023-05-22 PROCEDURE — 85610 PROTHROMBIN TIME: CPT | Performed by: STUDENT IN AN ORGANIZED HEALTH CARE EDUCATION/TRAINING PROGRAM

## 2023-05-22 PROCEDURE — 99214 OFFICE O/P EST MOD 30 MIN: CPT | Mod: PBBFAC | Performed by: STUDENT IN AN ORGANIZED HEALTH CARE EDUCATION/TRAINING PROGRAM

## 2023-05-22 PROCEDURE — 36415 COLL VENOUS BLD VENIPUNCTURE: CPT | Performed by: STUDENT IN AN ORGANIZED HEALTH CARE EDUCATION/TRAINING PROGRAM

## 2023-05-22 RX ORDER — DOCUSATE SODIUM 100 MG/1
100 CAPSULE, LIQUID FILLED ORAL DAILY
COMMUNITY

## 2023-05-22 NOTE — PROGRESS NOTES
Subjective     Patient ID: Paula Vilchis is a 67 y.o. female.    Chief Complaint: HCV Cirrhosis (No complaints.)    66-year-old female, history of hepatitis-C treated with Epclusa achieved SVR12 in September of 2020, presenting to clinic today for follow-up appointment.  She had originally been referred to transplant in 2019 when 1st diagnosed with hepatitis-C had F4 disease at that time as well but did not attend that appointment.  Initial ultrasound had revealed heterogeneous appearing liver that showed probable cirrhosis, her risk factors include tattoos and a blood transfusion before 1992 and a long-term partner with hepatitis-C.  Patient also smoker.  Most recent EGD was performed January 2022 that revealed no varices, normal stomach and duodenum.  Most up-to-date ultrasound the abdomen was November 8, 2021 that revealed no liver lesions present.  Is not on PPI therapy however she has been complaining of acid reflux for many years and a chronic cough.  I prescribed her PPI therapy back in November with 3 refills.  Patient requesting PPI therapy again at this time she is currently out, is complaining of similar symptoms she had a previous visit of nonspecific they abdominal pain.  She does admit recently she has been using NSAIDs, most notably Aleve has been using it more frequently sometimes 3 or 4 times per day due to hip and back pain.  Advised her to discontinue Aleve she can use Tylenol as needed but no more than 2000 mg per day.  She denies any melena, hematochezia, or hematemesis.  Denies any jaundice, lower extremity swelling.  She is overdue for updated abdominal ultrasound.  Her DEXA scan was normal.    Today's visit:  Only complaint today continues to be of epigastric abdominal pain we have extensively worked her up in the past since last appointment including Helicobacter pylori stool antigen which was negative.  Further probing today she has been taking naproxen at least 2 times per night, on a  relatively empty stomach.  Question whether or not she is been taking her Protonix and conjunction with this.  Counseled her extensively to avoid NSAIDs, and Tylenol is in fact okay as long as no more than 2000 mg per day.  She states she drinks wine occasionally but on further probing seems to be more frequently on weekends, states that her epigastric pain only flares when she is at home, but never when she actually comes to clinic.  Will check a lipase level today she has never had workup for pancreatitis.  Due for INR today to complete MELD workup.  Most up-to-date ultrasound was performed 12/08/2022 which only showed baseline cirrhotic liver disease no masses or new liver lesions found.  Up-to-date on EGD was performed January 2022 with normal esophagus, stomach, duodenum.  Review of Systems   Constitutional: Negative.    HENT: Negative.     Eyes: Negative.    Respiratory: Negative.     Cardiovascular: Negative.    Gastrointestinal:  Positive for abdominal pain.   Endocrine: Negative.    Genitourinary: Negative.    Musculoskeletal:  Positive for arthralgias and joint deformity.   Allergic/Immunologic: Negative.    Neurological: Negative.    Hematological: Negative.    Psychiatric/Behavioral: Negative.          Objective   Vitals:    05/22/23 1407   BP: (!) 157/86   Pulse:    Resp:    Temp:        Physical Exam  Constitutional:       Appearance: Normal appearance. She is normal weight.   HENT:      Head: Normocephalic and atraumatic.      Mouth/Throat:      Mouth: Mucous membranes are moist.      Pharynx: Oropharynx is clear.   Cardiovascular:      Rate and Rhythm: Normal rate and regular rhythm.      Pulses: Normal pulses.      Heart sounds: Normal heart sounds.   Pulmonary:      Effort: Pulmonary effort is normal.      Breath sounds: Normal breath sounds.   Abdominal:      General: Abdomen is flat. Bowel sounds are normal.      Palpations: Abdomen is soft.   Musculoskeletal:      Comments: Prominent Heberden  nodes in the D IP joints, Adrian's nodes in the PIP joints both hands.   Skin:     General: Skin is warm and dry.   Neurological:      General: No focal deficit present.      Mental Status: She is alert and oriented to person, place, and time. Mental status is at baseline.   Psychiatric:         Mood and Affect: Mood normal.         Thought Content: Thought content normal.         Judgment: Judgment normal.        Assessment and Plan     Problem List Items Addressed This Visit          GI    Compensated HCV cirrhosis - Primary    Relevant Orders    US Abdomen Limited_Liver    Protime-INR    Lipase       Background:  Alcohol: yes, drinks wine occasionally she states on weekends.    Tobacco: yes    Liver disease: HCV    MELD-Na: 12, need up to date today  Child-Layton Class: A    Transplant: not under evaluation, low MELD    Cirrhosis is decompensated with:    Ascites: no  Spontaneous bacterial peritonitis: No  Hepatic Encephalopathy: No  Hepatocellular carcinoma: No  Hepatorenal syndrome: No  Hyponatremia: No  Muscle wasting: No  Portal vein thrombosis: No    Screening:  Last EGD:  January 2022, no varices, normal stomach and duodenum.      Last imaging study:  Abd US 12/08/2022: Cirrhotic appearing liver no masses or lesions found.    CIRRHOSIS COUNSELING:  - strict abstinence of alcohol use  - low sodium (salt) 2000mg per day  - Nutrition: 25-30kcal (calorie per body weight in kilogram) per day  - No need to restrict protein in diet  - High protein diet: 1.2-1.5 gram/kg (protein per body weight in kg) per day to prevent muscle mass loss  - Resistance exercises for muscle strength  - Avoid raw seafoods due to risk of fatal Vibrio vulnificus infection  - Avoid Non-steroidal anti-inflammatory drugs (NSAIDs) such as ibuprofen, Motrin, naproxen, Aleve due to risk of kidney damage  - Can take acetaminophen (tylenol), no more than 2000mg per day  - Compliance with all medications  - Ultrasound or MRI of the liver every 6  months for liver cancer screening  - Upper endoscopy every 1-2 years to screen for varices      Up-to-date on EGD, HCC screening.  Due for repeat HCC screening next month June 2023 placed orders.    Needs an INR to complete most up-to-date MELD labs, previously had a CMP done at urgent care/rheumatology visit.    H pylori stool antigen was negative, will check lipase level today to further workup her epigastric pain.  However I suspect she is developed some form gastritis as she is been taking naproxen 2 times per night, on an empty stomach, counseled her to discontinue taking this medicine.    Continue follow-up with Rheumatology for severe OA bilateral hands.    Next EGD will be due in 2024.

## 2023-05-30 DIAGNOSIS — K29.70 GASTRITIS, PRESENCE OF BLEEDING UNSPECIFIED, UNSPECIFIED CHRONICITY, UNSPECIFIED GASTRITIS TYPE: ICD-10-CM

## 2023-05-30 RX ORDER — PANTOPRAZOLE SODIUM 40 MG/1
TABLET, DELAYED RELEASE ORAL
Qty: 90 TABLET | Refills: 3 | Status: SHIPPED | OUTPATIENT
Start: 2023-05-30

## 2023-06-03 RX ORDER — HYDROXYZINE PAMOATE 25 MG/1
CAPSULE ORAL
Qty: 45 CAPSULE | Refills: 2 | Status: SHIPPED | OUTPATIENT
Start: 2023-06-03 | End: 2023-08-21 | Stop reason: SDUPTHER

## 2023-06-30 ENCOUNTER — TELEPHONE (OUTPATIENT)
Dept: GASTROENTEROLOGY | Facility: CLINIC | Age: 68
End: 2023-06-30
Payer: MEDICARE

## 2023-06-30 NOTE — TELEPHONE ENCOUNTER
"Spoke with pt. Pt complaining of epigastric discomfort- states it is not pain, nausea and night sweats. Pt repeatedly requesting to see Paola. Informed pt that Paola is our Nurse and she is not a provider. Pt is requesting an appointment with GI. After review of Dr. Segovia's last note, it appears she was taking naproxen in the evening on an empty stomach- pt was advised to not take on empty stomach. Pt states she is not taking any naproxen, she only take Tylenol as needed. Pt is stating her stomach "just feels different" and is requesting to be seen. Please advise.    ----- Message from Divina Simmons sent at 6/27/2023  2:28 PM CDT -----  Regarding: Pt having issues  Pt called and stated she would like to be scheduled as she is having stomach issues. Pt is having sharp pain when she wakes up and has sweats. Pt can be reached @ [phone]        Thank You  Courtney CASAS"

## 2023-08-06 ENCOUNTER — HOSPITAL ENCOUNTER (EMERGENCY)
Facility: HOSPITAL | Age: 68
Discharge: HOME | End: 2023-08-06
Attending: INTERNAL MEDICINE
Payer: MEDICARE

## 2023-08-06 VITALS
OXYGEN SATURATION: 99 % | RESPIRATION RATE: 18 BRPM | BODY MASS INDEX: 24.6 KG/M2 | SYSTOLIC BLOOD PRESSURE: 123 MMHG | HEIGHT: 59 IN | TEMPERATURE: 99 F | DIASTOLIC BLOOD PRESSURE: 77 MMHG | WEIGHT: 122 LBS | HEART RATE: 76 BPM

## 2023-08-06 DIAGNOSIS — S76.211A INGUINAL STRAIN, RIGHT, INITIAL ENCOUNTER: ICD-10-CM

## 2023-08-06 DIAGNOSIS — M25.511 ACUTE PAIN OF RIGHT SHOULDER: Primary | ICD-10-CM

## 2023-08-06 PROCEDURE — 63600175 PHARM REV CODE 636 W HCPCS: Performed by: PHYSICIAN ASSISTANT

## 2023-08-06 PROCEDURE — 99284 EMERGENCY DEPT VISIT MOD MDM: CPT

## 2023-08-06 PROCEDURE — 96372 THER/PROPH/DIAG INJ SC/IM: CPT | Performed by: PHYSICIAN ASSISTANT

## 2023-08-06 RX ORDER — MELOXICAM 7.5 MG/1
7.5 TABLET ORAL 2 TIMES DAILY
Qty: 28 TABLET | Refills: 0 | Status: SHIPPED | OUTPATIENT
Start: 2023-08-06 | End: 2023-08-21

## 2023-08-06 RX ORDER — KETOROLAC TROMETHAMINE 30 MG/ML
INJECTION, SOLUTION INTRAMUSCULAR; INTRAVENOUS
Status: DISPENSED
Start: 2023-08-06 | End: 2023-08-07

## 2023-08-06 RX ORDER — KETOROLAC TROMETHAMINE 30 MG/ML
30 INJECTION, SOLUTION INTRAMUSCULAR; INTRAVENOUS
Status: COMPLETED | OUTPATIENT
Start: 2023-08-06 | End: 2023-08-06

## 2023-08-06 RX ORDER — METHOCARBAMOL 750 MG/1
1500 TABLET, FILM COATED ORAL 3 TIMES DAILY
Qty: 42 TABLET | Refills: 0 | Status: SHIPPED | OUTPATIENT
Start: 2023-08-06 | End: 2023-08-13

## 2023-08-06 RX ADMIN — KETOROLAC TROMETHAMINE 30 MG: 60 INJECTION, SOLUTION INTRAMUSCULAR at 12:08

## 2023-08-06 NOTE — DISCHARGE INSTRUCTIONS
Use ice and heat therapy, 20 min on and 20 min off.     You have been prescribed meloxicam for pain. This is an Non-Steroidal Anti-Inflammatory (NSAID) Medication. Please do not take any additional NSAIDs while you are taking this medication including (Advil, Aleve, Motrin, Ibuprofen, diclofenac, Naprosyn, Toradol, ketoralac, etc.). Please stop taking this medication if you experience: weakness, itching, yellow skin or eyes, joint pains, vomiting blood, blood or black stools, unusual weight gain, or swelling in your arms, legs, hands, or feet.     You have been prescribed Robaxin (Methocarbamol) for muscle spasms/pain. Please do not take this medication while working, drinking alcohol, swimming, or while driving/operating heavy machinery. This medication may cause drowsiness, dizziness, impair judgment, and reduce physical capabilities.You should not drive, operate heavy machinery, or make life changing decisions while taking this medication.

## 2023-08-06 NOTE — ED PROVIDER NOTES
Encounter Date: 2023       History     Chief Complaint   Patient presents with    Shoulder Pain     R shoulder and groin pain starting last night. States she was at a party last night and was dancing and the pain started while she was dancing.     67-year-old female presents to ED for evaluation right shoulder and groin pain since last night.  Patient reports that she was dancing when started to have pain in right groin and hip. Denies any fall or injury. States chronic pain in her right shoulder. Complains of pain worse with movements. Denies any SOB or CP. No meds taking.     The history is provided by the patient. No  was used.     Review of patient's allergies indicates:  No Known Allergies  Past Medical History:   Diagnosis Date    Compensated HCV cirrhosis     Depression     HTN (hypertension)     Hx of colonic polyp     Situational depression     Thrombocytopenia, unspecified     Tobacco user      Past Surgical History:   Procedure Laterality Date     SECTION       SECTION       SECTION       SECTION      Drainage of Foot Skin, External Approach Right 2017    Draingage of Foot Skin, External Approach Right 2017    HERNIA REPAIR      Incision and Drainage of Abscess; complicated or multiple N/A 2017    Incision and Drainage of Hematoma, seroma or fluid collection N/A 2017     Family History   Problem Relation Age of Onset    Diabetes Father     Hypertension Father     Cancer Mother     Hypertension Mother     Stroke Paternal Uncle      Social History     Tobacco Use    Smoking status: Every Day     Current packs/day: 0.00     Types: Cigars    Smokeless tobacco: Never    Tobacco comments:     3 Black N Mild cigar a day   Substance Use Topics    Alcohol use: Yes     Alcohol/week: 3.0 standard drinks of alcohol     Types: 3 Glasses of wine per week     Comment: Wine Occasionally    Drug use: Yes     Frequency: 4.0 times per week      Types: Marijuana     Review of Systems   Constitutional:  Negative for chills, fatigue and fever.   Respiratory:  Negative for shortness of breath.    Cardiovascular:  Negative for chest pain.   Gastrointestinal:  Negative for abdominal pain, diarrhea, nausea and vomiting.   Genitourinary:  Negative for dysuria, flank pain, frequency and urgency.   Musculoskeletal:  Positive for arthralgias and myalgias. Negative for back pain.   All other systems reviewed and are negative.      Physical Exam     Initial Vitals [08/06/23 1153]   BP Pulse Resp Temp SpO2   123/77 76 18 98.7 °F (37.1 °C) 99 %      MAP       --         Physical Exam    Vitals reviewed.  Constitutional: She appears well-developed.   HENT:   Head: Normocephalic and atraumatic.   Right Ear: Tympanic membrane and external ear normal.   Left Ear: Tympanic membrane and external ear normal.   Mouth/Throat: Uvula is midline, oropharynx is clear and moist and mucous membranes are normal. No trismus in the jaw. No uvula swelling. No oropharyngeal exudate, posterior oropharyngeal edema or posterior oropharyngeal erythema.   Eyes: Conjunctivae are normal. Pupils are equal, round, and reactive to light.   Neck: Neck supple.   Normal range of motion.  Cardiovascular:  Normal rate, regular rhythm and normal heart sounds.           Pulmonary/Chest: Breath sounds normal. She has no wheezes. She has no rhonchi. She has no rales.   Abdominal: Abdomen is soft. Bowel sounds are normal. There is no abdominal tenderness. There is no rebound and no guarding.   Musculoskeletal:         General: Normal range of motion.        Arms:       Cervical back: Normal, normal range of motion and neck supple.      Thoracic back: Normal.      Lumbar back: Normal.        Legs:       Comments: Tenderness noted to right groin. DP pulses 2+.      Neurological: She is alert and oriented to person, place, and time.   Skin: Skin is warm and dry.   Psychiatric: She has a normal mood and affect.          ED Course   Procedures  Labs Reviewed - No data to display       Imaging Results    None          Medications   ketorolac injection 30 mg (30 mg Intramuscular Given 8/6/23 1245)     Medical Decision Making:   Initial Assessment:   67-year-old female presents to ED for evaluation right shoulder and groin pain since last night.  Patient reports that she was dancing when started to have pain in right groin and hip. Denies any fall or injury. States chronic pain in her right shoulder. Complains of pain worse with movements. Denies any SOB or CP. No meds taking.   Differential Diagnosis:   Joint pain, contusion, muscle strain  ED Management:  Patient GCS 15 and neuro intact. Complains of msk pain after dancing last night. States has chronic right shoulder pain. Patient ambulatory with steady gait. Denies any trauma or injury. No indication for XR at this time. Will treat with short course of NSAIDS and muscle relaxer. Return ED precautions given.  I provided counseling to patient with regard to condition, the treatment plan, specific conditions for return, and the importance of follow up. Detailed written and verbal instructions provided to patient and she expressed a verbal understanding of the discharge instructions and overall management plan. Reiterated the importance of medication administration and safety. Advised patient to follow up with primary care provider in 3-5 days or sooner if needed.  Answered questions at this time. The patient is stable for discharge.                             Clinical Impression:   Final diagnoses:  [M25.511] Acute pain of right shoulder (Primary)  [S76.211A] Inguinal strain, right, initial encounter        ED Disposition Condition    Discharge Stable          ED Prescriptions       Medication Sig Dispense Start Date End Date Auth. Provider    meloxicam (MOBIC) 7.5 MG tablet Take 1 tablet (7.5 mg total) by mouth 2 (two) times a day. for 14 days 28 tablet 8/6/2023 8/20/2023  Ashley Ramirez PA    methocarbamoL (ROBAXIN) 750 MG Tab Take 2 tablets (1,500 mg total) by mouth 3 (three) times daily. for 7 days 42 tablet 8/6/2023 8/13/2023 Ashley Ramirez PA          Follow-up Information       Follow up With Specialties Details Why Contact Info    Nancy Rebolledo, NABILP Nurse Practitioner   [address] [facility] [address]  [phone]               Ashley Ramirez PA  08/06/23 1257

## 2023-08-10 ENCOUNTER — LAB VISIT (OUTPATIENT)
Dept: LAB | Facility: HOSPITAL | Age: 68
End: 2023-08-10
Attending: NURSE PRACTITIONER
Payer: MEDICARE

## 2023-08-10 DIAGNOSIS — I10 HYPERTENSION, UNSPECIFIED TYPE: ICD-10-CM

## 2023-08-10 DIAGNOSIS — K74.69 COMPENSATED HCV CIRRHOSIS: ICD-10-CM

## 2023-08-10 DIAGNOSIS — B19.20 COMPENSATED HCV CIRRHOSIS: ICD-10-CM

## 2023-08-10 DIAGNOSIS — E55.9 VITAMIN D DEFICIENCY: ICD-10-CM

## 2023-08-10 DIAGNOSIS — Z11.4 ENCOUNTER FOR SCREENING FOR HIV: ICD-10-CM

## 2023-08-10 DIAGNOSIS — D69.6 THROMBOCYTOPENIA, UNSPECIFIED: ICD-10-CM

## 2023-08-10 LAB
ABS NEUT CALC (OHS): 0.72 X10(3)/MCL (ref 2.1–9.2)
ALBUMIN SERPL-MCNC: 2.9 G/DL (ref 3.4–4.8)
ALBUMIN/GLOB SERPL: 0.6 RATIO (ref 1.1–2)
ALP SERPL-CCNC: 144 UNIT/L (ref 40–150)
ALT SERPL-CCNC: 26 UNIT/L (ref 0–55)
AST SERPL-CCNC: 51 UNIT/L (ref 5–34)
BILIRUB SERPL-MCNC: 0.5 MG/DL
BUN SERPL-MCNC: 9 MG/DL (ref 9.8–20.1)
CALCIUM SERPL-MCNC: 8.4 MG/DL (ref 8.4–10.2)
CHLORIDE SERPL-SCNC: 105 MMOL/L (ref 98–107)
CHOLEST SERPL-MCNC: 127 MG/DL
CHOLEST/HDLC SERPL: 3 {RATIO} (ref 0–5)
CO2 SERPL-SCNC: 26 MMOL/L (ref 23–31)
CREAT SERPL-MCNC: 0.58 MG/DL (ref 0.55–1.02)
DEPRECATED CALCIDIOL+CALCIFEROL SERPL-MC: 23.3 NG/ML (ref 30–80)
EOSINOPHIL NFR BLD MANUAL: 0.3 X10(3)/MCL (ref 0–0.9)
EOSINOPHIL NFR BLD MANUAL: 13 % (ref 0–8)
ERYTHROCYTE [DISTWIDTH] IN BLOOD BY AUTOMATED COUNT: 14.6 % (ref 11.5–17)
GFR SERPLBLD CREATININE-BSD FMLA CKD-EPI: >60 MLS/MIN/1.73/M2
GLOBULIN SER-MCNC: 4.7 GM/DL (ref 2.4–3.5)
GLUCOSE SERPL-MCNC: 88 MG/DL (ref 82–115)
HCT VFR BLD AUTO: 36.3 % (ref 37–47)
HDLC SERPL-MCNC: 37 MG/DL (ref 35–60)
HGB BLD-MCNC: 11.7 G/DL (ref 12–16)
HIV 1+2 AB+HIV1 P24 AG SERPL QL IA: NONREACTIVE
LDLC SERPL CALC-MCNC: 72 MG/DL (ref 50–140)
LYMPHOCYTES NFR BLD MANUAL: 1 X10(3)/MCL
LYMPHOCYTES NFR BLD MANUAL: 43 % (ref 13–40)
MCH RBC QN AUTO: 29.1 PG (ref 27–31)
MCHC RBC AUTO-ENTMCNC: 32.2 G/DL (ref 33–36)
MCV RBC AUTO: 90.3 FL (ref 80–94)
MONOCYTES NFR BLD MANUAL: 0.3 X10(3)/MCL (ref 0.1–1.3)
MONOCYTES NFR BLD MANUAL: 13 % (ref 2–11)
NEUTROPHILS NFR BLD MANUAL: 31 % (ref 47–80)
PLATELET # BLD AUTO: 121 X10(3)/MCL (ref 130–400)
PLATELET # BLD EST: ADEQUATE 10*3/UL
PMV BLD AUTO: 10.9 FL (ref 7.4–10.4)
POTASSIUM SERPL-SCNC: 4.3 MMOL/L (ref 3.5–5.1)
PROT SERPL-MCNC: 7.6 GM/DL (ref 5.8–7.6)
RBC # BLD AUTO: 4.02 X10(6)/MCL (ref 4.2–5.4)
RBC MORPH BLD: NORMAL
SODIUM SERPL-SCNC: 138 MMOL/L (ref 136–145)
TRIGL SERPL-MCNC: 88 MG/DL (ref 37–140)
TSH SERPL-ACNC: 2.13 UIU/ML (ref 0.35–4.94)
VLDLC SERPL CALC-MCNC: 18 MG/DL
WBC # SPEC AUTO: 2.33 X10(3)/MCL (ref 4.5–11.5)

## 2023-08-10 PROCEDURE — 84443 ASSAY THYROID STIM HORMONE: CPT

## 2023-08-10 PROCEDURE — 85027 COMPLETE CBC AUTOMATED: CPT

## 2023-08-10 PROCEDURE — 80061 LIPID PANEL: CPT

## 2023-08-10 PROCEDURE — 87389 HIV-1 AG W/HIV-1&-2 AB AG IA: CPT

## 2023-08-10 PROCEDURE — 36415 COLL VENOUS BLD VENIPUNCTURE: CPT

## 2023-08-10 PROCEDURE — 82306 VITAMIN D 25 HYDROXY: CPT

## 2023-08-10 PROCEDURE — 80053 COMPREHEN METABOLIC PANEL: CPT

## 2023-08-16 LAB
INR PPP: 1.2
PROTHROMBIN TIME: 14.4 SECONDS (ref 11.4–14)

## 2023-08-21 ENCOUNTER — OFFICE VISIT (OUTPATIENT)
Dept: INTERNAL MEDICINE | Facility: CLINIC | Age: 68
End: 2023-08-21
Payer: MEDICARE

## 2023-08-21 VITALS
BODY MASS INDEX: 24.8 KG/M2 | WEIGHT: 123 LBS | SYSTOLIC BLOOD PRESSURE: 152 MMHG | HEIGHT: 59 IN | TEMPERATURE: 98 F | DIASTOLIC BLOOD PRESSURE: 84 MMHG | HEART RATE: 69 BPM | RESPIRATION RATE: 16 BRPM

## 2023-08-21 DIAGNOSIS — E55.9 VITAMIN D DEFICIENCY: ICD-10-CM

## 2023-08-21 DIAGNOSIS — K74.69 COMPENSATED HCV CIRRHOSIS: ICD-10-CM

## 2023-08-21 DIAGNOSIS — F41.9 ANXIETY AND DEPRESSION: ICD-10-CM

## 2023-08-21 DIAGNOSIS — Z78.0 POSTMENOPAUSAL: ICD-10-CM

## 2023-08-21 DIAGNOSIS — Z23 NEED FOR PNEUMOCOCCAL 20-VALENT CONJUGATE VACCINATION: ICD-10-CM

## 2023-08-21 DIAGNOSIS — I10 HYPERTENSION, UNSPECIFIED TYPE: ICD-10-CM

## 2023-08-21 DIAGNOSIS — F17.210 CIGARETTE NICOTINE DEPENDENCE WITHOUT COMPLICATION: ICD-10-CM

## 2023-08-21 DIAGNOSIS — F32.A ANXIETY AND DEPRESSION: ICD-10-CM

## 2023-08-21 DIAGNOSIS — B19.20 COMPENSATED HCV CIRRHOSIS: ICD-10-CM

## 2023-08-21 PROCEDURE — G0009 ADMIN PNEUMOCOCCAL VACCINE: HCPCS | Mod: PBBFAC

## 2023-08-21 PROCEDURE — 99406 PR TOBACCO USE CESSATION INTERMEDIATE 3-10 MINUTES: ICD-10-PCS | Mod: S$PBB,,, | Performed by: NURSE PRACTITIONER

## 2023-08-21 PROCEDURE — 3008F PR BODY MASS INDEX (BMI) DOCUMENTED: ICD-10-PCS | Mod: CPTII,,, | Performed by: NURSE PRACTITIONER

## 2023-08-21 PROCEDURE — 1126F AMNT PAIN NOTED NONE PRSNT: CPT | Mod: CPTII,,, | Performed by: NURSE PRACTITIONER

## 2023-08-21 PROCEDURE — 1101F PT FALLS ASSESS-DOCD LE1/YR: CPT | Mod: CPTII,,, | Performed by: NURSE PRACTITIONER

## 2023-08-21 PROCEDURE — 99214 OFFICE O/P EST MOD 30 MIN: CPT | Mod: S$PBB,25,, | Performed by: NURSE PRACTITIONER

## 2023-08-21 PROCEDURE — 4010F ACE/ARB THERAPY RXD/TAKEN: CPT | Mod: CPTII,,, | Performed by: NURSE PRACTITIONER

## 2023-08-21 PROCEDURE — 1126F PR PAIN SEVERITY QUANTIFIED, NO PAIN PRESENT: ICD-10-PCS | Mod: CPTII,,, | Performed by: NURSE PRACTITIONER

## 2023-08-21 PROCEDURE — 3079F PR MOST RECENT DIASTOLIC BLOOD PRESSURE 80-89 MM HG: ICD-10-PCS | Mod: CPTII,,, | Performed by: NURSE PRACTITIONER

## 2023-08-21 PROCEDURE — 3077F PR MOST RECENT SYSTOLIC BLOOD PRESSURE >= 140 MM HG: ICD-10-PCS | Mod: CPTII,,, | Performed by: NURSE PRACTITIONER

## 2023-08-21 PROCEDURE — 1159F MED LIST DOCD IN RCRD: CPT | Mod: CPTII,,, | Performed by: NURSE PRACTITIONER

## 2023-08-21 PROCEDURE — 4010F PR ACE/ARB THEARPY RXD/TAKEN: ICD-10-PCS | Mod: CPTII,,, | Performed by: NURSE PRACTITIONER

## 2023-08-21 PROCEDURE — 3008F BODY MASS INDEX DOCD: CPT | Mod: CPTII,,, | Performed by: NURSE PRACTITIONER

## 2023-08-21 PROCEDURE — 1160F PR REVIEW ALL MEDS BY PRESCRIBER/CLIN PHARMACIST DOCUMENTED: ICD-10-PCS | Mod: CPTII,,, | Performed by: NURSE PRACTITIONER

## 2023-08-21 PROCEDURE — 3077F SYST BP >= 140 MM HG: CPT | Mod: CPTII,,, | Performed by: NURSE PRACTITIONER

## 2023-08-21 PROCEDURE — 1160F RVW MEDS BY RX/DR IN RCRD: CPT | Mod: CPTII,,, | Performed by: NURSE PRACTITIONER

## 2023-08-21 PROCEDURE — 3288F FALL RISK ASSESSMENT DOCD: CPT | Mod: CPTII,,, | Performed by: NURSE PRACTITIONER

## 2023-08-21 PROCEDURE — 99214 PR OFFICE/OUTPT VISIT, EST, LEVL IV, 30-39 MIN: ICD-10-PCS | Mod: S$PBB,25,, | Performed by: NURSE PRACTITIONER

## 2023-08-21 PROCEDURE — 1159F PR MEDICATION LIST DOCUMENTED IN MEDICAL RECORD: ICD-10-PCS | Mod: CPTII,,, | Performed by: NURSE PRACTITIONER

## 2023-08-21 PROCEDURE — 99215 OFFICE O/P EST HI 40 MIN: CPT | Mod: PBBFAC,25 | Performed by: NURSE PRACTITIONER

## 2023-08-21 PROCEDURE — 90677 PCV20 VACCINE IM: CPT | Mod: PBBFAC

## 2023-08-21 PROCEDURE — 3288F PR FALLS RISK ASSESSMENT DOCUMENTED: ICD-10-PCS | Mod: CPTII,,, | Performed by: NURSE PRACTITIONER

## 2023-08-21 PROCEDURE — 99406 BEHAV CHNG SMOKING 3-10 MIN: CPT | Mod: S$PBB,,, | Performed by: NURSE PRACTITIONER

## 2023-08-21 PROCEDURE — 1101F PR PT FALLS ASSESS DOC 0-1 FALLS W/OUT INJ PAST YR: ICD-10-PCS | Mod: CPTII,,, | Performed by: NURSE PRACTITIONER

## 2023-08-21 PROCEDURE — 3079F DIAST BP 80-89 MM HG: CPT | Mod: CPTII,,, | Performed by: NURSE PRACTITIONER

## 2023-08-21 RX ORDER — FAMOTIDINE 20 MG/1
20 TABLET, FILM COATED ORAL NIGHTLY PRN
Qty: 30 TABLET | Refills: 2 | Status: SHIPPED | OUTPATIENT
Start: 2023-08-21 | End: 2024-08-20

## 2023-08-21 RX ORDER — LOSARTAN POTASSIUM 25 MG/1
25 TABLET ORAL DAILY
Qty: 30 TABLET | Refills: 3 | Status: SHIPPED | OUTPATIENT
Start: 2023-08-21 | End: 2023-10-18 | Stop reason: SDUPTHER

## 2023-08-21 RX ORDER — HYDROXYZINE PAMOATE 25 MG/1
CAPSULE ORAL
Qty: 45 CAPSULE | Refills: 2 | Status: SHIPPED | OUTPATIENT
Start: 2023-08-21 | End: 2023-10-18 | Stop reason: SDUPTHER

## 2023-08-21 RX ORDER — POTASSIUM CHLORIDE 750 MG/1
10 TABLET, EXTENDED RELEASE ORAL DAILY
Qty: 90 TABLET | Refills: 1 | Status: SHIPPED | OUTPATIENT
Start: 2023-08-21 | End: 2023-10-18 | Stop reason: SDUPTHER

## 2023-08-21 RX ORDER — FLUOXETINE HYDROCHLORIDE 40 MG/1
40 CAPSULE ORAL DAILY
Qty: 90 CAPSULE | Refills: 1 | Status: SHIPPED | OUTPATIENT
Start: 2023-08-21 | End: 2023-10-18 | Stop reason: SDUPTHER

## 2023-08-21 RX ORDER — METHOCARBAMOL 750 MG/1
750 TABLET, FILM COATED ORAL 3 TIMES DAILY
COMMUNITY
End: 2023-10-18

## 2023-08-21 RX ORDER — ASPIRIN 325 MG
50000 TABLET, DELAYED RELEASE (ENTERIC COATED) ORAL
Qty: 12 CAPSULE | Refills: 0 | Status: SHIPPED | OUTPATIENT
Start: 2023-08-21 | End: 2023-10-18

## 2023-08-21 RX ADMIN — PNEUMOCOCCAL 20-VALENT CONJUGATE VACCINE 0.5 ML
2.2; 2.2; 2.2; 2.2; 2.2; 2.2; 2.2; 2.2; 2.2; 2.2; 2.2; 2.2; 2.2; 2.2; 2.2; 2.2; 4.4; 2.2; 2.2; 2.2 INJECTION, SUSPENSION INTRAMUSCULAR at 02:08

## 2023-08-21 NOTE — PROGRESS NOTES
Internal Medicine Clinic  KATHARINE Cooney     Patient Name: Paula Vilchis   : 1955  MRN:[id number]     Chief Complaint     Chief Complaint   Patient presents with    Follow-up     Lab review        History of Present Illness     67 year old AAF, presents in clinic for lab review. Request pneumo vaccine. BP elevated today. Asymptomatic. Take SSRI daily but states very anxious, and asks about BH.  PMH HTN, HEP C Treated , Liver Cirrhosis, Neutropenia, thrombocytopenia (likely from hep Cirrhosis), mixed hearing loss, positive RF & CCP, tobacco user (Smokes 1-2 cigars per day), alcohol use, daily marijuana use, stuttering.   Previously with ID clinic, finished 12 week course of Epclusa on 2020, undetectable viral load. Following [facility] GI clinic for MELD-Na score 12 Child-Layton Class B (score 7). Pt is completely asymptomatic at this time. ABD US 2022 Coarse heterogeneous and nodular appearing liver consistent the patient's history of cirrhosis. Status post cholecystectomy  Reports occasionally using Aleve for joint pain. She is drinking wine again, ~2 times per week.     Prior ECHO (abd US 2020; displayed Prominent inferior vena cava measuring up to 2.9 cm in AP diameter at the level of the liver.  Repeat ECHO performed on 2021, and displayed EF 55%, grade 1 diastolic dysfunction, Aortic valve sclerosis without stenosis, mild to moderate concentric left ventricular hypertrophy, mild mitral and tricuspid regurgitation. Referred to [facility] Cardio clinic 10/26/2021, apt pending. Following Dr. Mon Rheum for +RF, +CCP and multiple joint pain. Denies CP, SOB, HA, easy bruising, bleeding, epistaxis, gum bleeding, GI/ bleeding, bone pain, night sweats, Vomiting, diarrhea, constipation, tammy colored stools, fatigue, weakness, abd pain, fever, icterus.     EGD normal 2022 Dr. Cavazos. Repeat EGD .  Following [facility] GI clinic, Dr. Segovia,HEP C treatment; 12 weeks of Epclusa. She completed  "tx 5/11/2020.   DEXA 11/10/2021 normal.  MMG 2/28/2022: BIRADS 1  [facility] neg 3/15/23  [facility] C 4/5/2021; PAP; NIL; scheduled 1/31/23                 Review of Systems     Review of Systems   Constitutional: Negative.    HENT: Negative.     Eyes: Negative.    Respiratory: Negative.     Cardiovascular: Negative.    Gastrointestinal: Negative.    Endocrine: Negative.    Genitourinary: Negative.    Musculoskeletal: Negative.    Integumentary:  Negative.   Allergic/Immunologic: Negative.    Neurological: Negative.    Hematological: Negative.    Psychiatric/Behavioral: Negative.     All other systems reviewed and are negative.       Physical Examination     Visit Vitals  BP (!) 152/84 (BP Location: Left arm, Patient Position: Sitting, BP Method: Medium (Manual))   Pulse 69   Temp 97.8 °F (36.6 °C) (Oral)   Resp 16   Ht 4' 11" (1.499 m)   Wt 55.8 kg (123 lb)   BMI 24.84 kg/m²        BP Readings from Last 6 Encounters:   08/21/23 (!) 152/84   08/06/23 123/77   05/22/23 (!) 157/86   03/22/23 124/76   02/27/23 122/74   02/01/23 129/82   ]    Wt Readings from Last 6 Encounters:   08/21/23 55.8 kg (123 lb)   08/06/23 55.3 kg (122 lb)   05/22/23 57.6 kg (127 lb)   03/22/23 58 kg (127 lb 12.8 oz)   02/27/23 58.7 kg (129 lb 6.4 oz)   02/01/23 56.8 kg (125 lb 3.2 oz)   ]      Physical Exam  Vitals and nursing note reviewed.   Constitutional:       Appearance: Normal appearance.   HENT:      Head: Normocephalic and atraumatic.      Right Ear: Tympanic membrane, ear canal and external ear normal.      Left Ear: Tympanic membrane, ear canal and external ear normal.      Nose: Nose normal.      Mouth/Throat:      Mouth: Mucous membranes are moist.      Pharynx: Oropharynx is clear.   Eyes:      Extraocular Movements: Extraocular movements intact.      Conjunctiva/sclera: Conjunctivae normal.      Pupils: Pupils are equal, round, and reactive to light.   Cardiovascular:      Rate and Rhythm: Normal rate and regular rhythm.      " Pulses: Normal pulses.      Heart sounds: Normal heart sounds.   Pulmonary:      Effort: Pulmonary effort is normal.      Breath sounds: Normal breath sounds.   Abdominal:      General: Abdomen is flat. Bowel sounds are normal.      Palpations: Abdomen is soft.   Musculoskeletal:         General: Normal range of motion.      Cervical back: Normal range of motion and neck supple.   Skin:     General: Skin is warm and dry.      Capillary Refill: Capillary refill takes less than 2 seconds.   Neurological:      General: No focal deficit present.      Mental Status: She is alert and oriented to person, place, and time. Mental status is at baseline.   Psychiatric:         Mood and Affect: Mood normal.         Behavior: Behavior normal.         Thought Content: Thought content normal.         Judgment: Judgment normal.          Labs / Imaging     Chemistry:  Lab Results   Component Value Date     08/10/2023    K 4.3 08/10/2023    CHLORIDE 105 08/10/2023    BUN 9.0 (L) 08/10/2023    CREATININE 0.58 08/10/2023    EGFRNORACEVR >60 08/10/2023    GLUCOSE 88 08/10/2023    CALCIUM 8.4 08/10/2023    ALKPHOS 144 08/10/2023    LABPROT 7.6 08/10/2023    ALBUMIN 2.9 (L) 08/10/2023    BILIDIR 0.6 01/28/2022    IBILI 0.70 01/28/2022    AST 51 (H) 08/10/2023    ALT 26 08/10/2023    UTFQBKHV43IA 23.3 (L) 08/10/2023        Lab Results   Component Value Date    HGBA1C 4.2 07/17/2018        Hematology:  Lab Results   Component Value Date    WBC 2.33 (L) 08/10/2023    RBC 4.02 (L) 08/10/2023    HGB 11.7 (L) 08/10/2023    HCT 36.3 (L) 08/10/2023    MCV 90.3 08/10/2023    MCH 29.1 08/10/2023    MCHC 32.2 (L) 08/10/2023    RDW 14.6 08/10/2023     (L) 08/10/2023    MPV 10.9 (H) 08/10/2023        Lipid Panel:  Lab Results   Component Value Date    CHOL 127 08/10/2023    HDL 37 08/10/2023    LDL 72.00 08/10/2023    TRIG 88 08/10/2023    TOTALCHOLEST 3 08/10/2023        Urine:  Lab Results   Component Value Date    COLORUA Yellow  02/01/2023    APPEARANCEUA Clear 02/01/2023    SGUA 1.016 02/01/2023    PHUA 7.0 02/01/2023    PROTEINUA Negative 02/01/2023    GLUCOSEUA Normal 02/01/2023    KETONESUA Negative 02/01/2023    BLOODUA Negative 02/01/2023    NITRITESUA Negative 02/01/2023    LEUKOCYTESUR 25 (A) 02/01/2023    RBCUA 0-5 02/01/2023    WBCUA 0-5 02/01/2023    BACTERIA None Seen 02/01/2023    SQEPUA Occ (A) 02/01/2023    HYALINECASTS None Seen 02/01/2023    CREATRANDUR 102.5 02/01/2023    PROTEINURINE 12.5 02/01/2023    UPROTCREA 0.1 02/01/2023          Assessment       ICD-10-CM ICD-9-CM   1. Hypertension, unspecified type  I10 401.9   2. Compensated HCV cirrhosis  K74.69 571.5    B19.20 070.54   3. Vitamin D deficiency  E55.9 268.9   4. Anxiety and depression  F41.9 300.00    F32.A 311   5. Cigarette nicotine dependence without complication [F17.210]  F17.210 305.1   6. Postmenopausal  Z78.0 V49.81   7. Need for pneumococcal 20-valent conjugate vaccination  Z23 V03.82        Plan     1. Hypertension, unspecified type  BP elevated, start losartan 25. BP check 4 wks. DASH diet: Eat more fruits, vegetables, and low fat dairy foods.  (Less than 2 grams of sodium per day).  Maintain healthy weight with goal BMI <30.   Exercise 30 minutes per day 5 days per week.  Home medications refilled and continued.   Home BP monitoring encouraged with BP parameters given.      2. Compensated HCV cirrhosis  Following GI    3. Vitamin D deficiency  Vitamin D level is low. Will start patient on a prescription of vitamin D3 45808 IU once a week for 12 weeks. Once this prescription is completed, patient advised to purchase OTC vitamin D3 2000 IU and take this once a day thereafter or unless notified otherwise. Repeat Vitamin D level at follow up.     4. Anxiety and depression  Med refills  Refer to    Practice deep breathing or abdominal breathing exercises when anxiety occurs.  Exercise daily. Get sunlight daily.  Avoid caffeine, alcohol and  stimulants.  Practice positive phrases and repeat throughout the day, yoga, lavender scents or Chamomile tea will help anxiety.  Set healthy boundaries, avoid people and conversations that increase stress.  Reports any symptoms of suicidal or homicidal ideations immediately, if clinic is closed go to nearest emergency room.   - Ambulatory referral/consult to Psychiatry; Future    5. Cigarette nicotine dependence without complication [F17.210]  Smoking cessation advised. Instructed on smoking cessation program through [facility] and pharmacological interventions to aid in cessation.  >5 minutes allotted to educate patient on the harms of smoking, the urgency to quit, and the development of a plan for smoking cessation.     6. Postmenopausal    - DXA Bone Density Axial Skeleton 1 or more sites; Future    7. Need for pneumococcal 20-valent conjugate vaccination  Admin today in clinic  - pneumoc 20-angel conj-dip cr(PF) (PREVNAR-20 (PF)) injection Syrg 0.5 mL        Current Outpatient Medications   Medication Instructions    cholecalciferol (vitamin D3) 50,000 Units, Oral, Every 7 days    diclofenac sodium (VOLTAREN) 1 % Gel SMARTSI Gram(s) Topical 4 Times Daily PRN    docusate sodium (STOOL SOFTENER) 100 mg, Oral, Daily    famotidine (PEPCID) 20 mg, Oral, Nightly PRN    FLUoxetine 40 mg, Oral, Daily    hydrOXYzine pamoate (VISTARIL) 25 MG Cap TAKE ONE CAPSULE BY MOUTH 4 TIMES A DAY AS NEEDED FOR ANXIETY    losartan (COZAAR) 25 mg, Oral, Daily    methocarbamoL (ROBAXIN) 750 mg, Oral, 3 times daily    multivitamin (THERAGRAN) per tablet 1 tablet, Oral, Daily    pantoprazole (PROTONIX) 40 MG tablet TAKE ONE TABLET BY MOUTH ONCE DAILY FOR THE STOMACH.    potassium chloride SA (K-DUR,KLOR-CON M) 10 MEQ tablet 10 mEq, Oral, Daily       Orders Placed This Encounter   Procedures    DXA Bone Density Axial Skeleton 1 or more sites    Ambulatory referral/consult to Psychiatry         Future Appointments   Date Time Provider Department  [place]   9/19/2023  8:45 AM Nancy Rebolledo FNP [facility] INTMED [place]    11/27/2023  1:00 PM Aime Segovia MD [facility] GASTRO [place]    1/31/2024  9:50 AM Jennifer Rodas ANP [facility] GYN [facility]        Follow up in about 4 weeks (around 9/18/2023) for BP check.    Labs thoroughly reviewed with patient. Medication refills addressed today.  RTC prn and 4 wks, with labs 1 week prior to the apt.  COVID 19 precautions given to patient.  Patient voices understanding of all discharge instructions.      KATHARINE Cooney

## 2023-08-25 ENCOUNTER — TELEPHONE (OUTPATIENT)
Dept: GASTROENTEROLOGY | Facility: CLINIC | Age: 68
End: 2023-08-25
Payer: MEDICARE

## 2023-08-25 NOTE — TELEPHONE ENCOUNTER
"----- Message from Ashley Atwood sent at 8/21/2023  2:14 PM CDT -----  Regarding: Sooner Appoitment ?  Patient came up to window and stated she has a follow up with Dr. Segovia scheduled for 11/27/2023 for a 6 month follow up and wants to be seen sooner due to "having stomach issues" .    Patient can be reached at [phone] with any questions.     Please Advise     Thank You     "

## 2023-08-28 ENCOUNTER — TELEPHONE (OUTPATIENT)
Dept: INTERNAL MEDICINE | Facility: CLINIC | Age: 68
End: 2023-08-28
Payer: MEDICARE

## 2023-08-28 NOTE — TELEPHONE ENCOUNTER
Patient called and stated she is congested, wheezing, and having a bad cough. Says she took a COVID test and was negative. Requesting medication. Please advise.

## 2023-08-31 ENCOUNTER — TELEPHONE (OUTPATIENT)
Dept: INTERNAL MEDICINE | Facility: CLINIC | Age: 68
End: 2023-08-31
Payer: MEDICARE

## 2023-08-31 NOTE — TELEPHONE ENCOUNTER
----- Message from Nancy Rebolledo, KATHARINE sent at 8/26/2023  4:33 PM CDT -----  Pt missed last [facility] apt Dr Mon on 4/6/23, please assist her to reschedule with [facility]. thx

## 2023-10-11 ENCOUNTER — OFFICE VISIT (OUTPATIENT)
Dept: GYNECOLOGY | Facility: CLINIC | Age: 68
End: 2023-10-11
Payer: MEDICARE

## 2023-10-11 VITALS
SYSTOLIC BLOOD PRESSURE: 153 MMHG | HEIGHT: 59 IN | TEMPERATURE: 98 F | WEIGHT: 128.81 LBS | DIASTOLIC BLOOD PRESSURE: 83 MMHG | RESPIRATION RATE: 20 BRPM | OXYGEN SATURATION: 100 % | BODY MASS INDEX: 25.97 KG/M2 | HEART RATE: 75 BPM

## 2023-10-11 DIAGNOSIS — Z72.0 TOBACCO USE: ICD-10-CM

## 2023-10-11 DIAGNOSIS — N95.2 ATROPHIC VAGINITIS: ICD-10-CM

## 2023-10-11 DIAGNOSIS — Z01.419 ENCOUNTER FOR ANNUAL ROUTINE GYNECOLOGICAL EXAMINATION: Primary | ICD-10-CM

## 2023-10-11 PROCEDURE — 3077F PR MOST RECENT SYSTOLIC BLOOD PRESSURE >= 140 MM HG: ICD-10-PCS | Mod: CPTII,,, | Performed by: NURSE PRACTITIONER

## 2023-10-11 PROCEDURE — 1101F PT FALLS ASSESS-DOCD LE1/YR: CPT | Mod: CPTII,,, | Performed by: NURSE PRACTITIONER

## 2023-10-11 PROCEDURE — 1159F MED LIST DOCD IN RCRD: CPT | Mod: CPTII,,, | Performed by: NURSE PRACTITIONER

## 2023-10-11 PROCEDURE — 4010F PR ACE/ARB THEARPY RXD/TAKEN: ICD-10-PCS | Mod: CPTII,,, | Performed by: NURSE PRACTITIONER

## 2023-10-11 PROCEDURE — G0101 CA SCREEN;PELVIC/BREAST EXAM: HCPCS | Mod: S$PBB,,, | Performed by: NURSE PRACTITIONER

## 2023-10-11 PROCEDURE — G0101 CA SCREEN;PELVIC/BREAST EXAM: HCPCS | Mod: PBBFAC | Performed by: NURSE PRACTITIONER

## 2023-10-11 PROCEDURE — 4010F ACE/ARB THERAPY RXD/TAKEN: CPT | Mod: CPTII,,, | Performed by: NURSE PRACTITIONER

## 2023-10-11 PROCEDURE — 3079F PR MOST RECENT DIASTOLIC BLOOD PRESSURE 80-89 MM HG: ICD-10-PCS | Mod: CPTII,,, | Performed by: NURSE PRACTITIONER

## 2023-10-11 PROCEDURE — 99214 OFFICE O/P EST MOD 30 MIN: CPT | Mod: PBBFAC | Performed by: NURSE PRACTITIONER

## 2023-10-11 PROCEDURE — 3288F FALL RISK ASSESSMENT DOCD: CPT | Mod: CPTII,,, | Performed by: NURSE PRACTITIONER

## 2023-10-11 PROCEDURE — 3079F DIAST BP 80-89 MM HG: CPT | Mod: CPTII,,, | Performed by: NURSE PRACTITIONER

## 2023-10-11 PROCEDURE — 3288F PR FALLS RISK ASSESSMENT DOCUMENTED: ICD-10-PCS | Mod: CPTII,,, | Performed by: NURSE PRACTITIONER

## 2023-10-11 PROCEDURE — 1101F PR PT FALLS ASSESS DOC 0-1 FALLS W/OUT INJ PAST YR: ICD-10-PCS | Mod: CPTII,,, | Performed by: NURSE PRACTITIONER

## 2023-10-11 PROCEDURE — 3077F SYST BP >= 140 MM HG: CPT | Mod: CPTII,,, | Performed by: NURSE PRACTITIONER

## 2023-10-11 PROCEDURE — G0101 PR CA SCREEN;PELVIC/BREAST EXAM: ICD-10-PCS | Mod: S$PBB,,, | Performed by: NURSE PRACTITIONER

## 2023-10-11 PROCEDURE — 1159F PR MEDICATION LIST DOCUMENTED IN MEDICAL RECORD: ICD-10-PCS | Mod: CPTII,,, | Performed by: NURSE PRACTITIONER

## 2023-10-11 RX ORDER — MUPIROCIN 20 MG/G
OINTMENT TOPICAL 2 TIMES DAILY
COMMUNITY
Start: 2023-09-22 | End: 2023-10-18

## 2023-10-11 RX ORDER — HYDROCORTISONE 25 MG/G
CREAM TOPICAL 2 TIMES DAILY PRN
COMMUNITY
Start: 2023-09-22 | End: 2023-10-18

## 2023-10-11 NOTE — PROGRESS NOTES
"  Subjective:       Patient ID: Paula Vilchis is a 67 y.o. female.    Chief Complaint:  Gynecologic Exam    History of Present Illness  The patient  here for annual exam. Pt is postmenopausal since late 40s. Denies history of abnormal paps. Last pap -NIL and HPV neg. MG-22 & BIRADS 1. Denies breast or urinary complaints. Denies pelvic pain, abnormal bleeding or discharge. Pt does c/o vaginal dryness with intercourse. Colonoscopy approx 5+ years ago, hx of polyps. Cologuard neg in . Pt followed by GI for Hep C. Admits tobacco use. DEXA 2021-WNL. Dep. screening 0. Denies fly hx of breast, ovarian, uterine or colon cancer. /83, did not take medication.    GYN & OB History  No LMP recorded. Patient is postmenopausal.   Date of Last Pap: 10/4/2021    Review of patient's allergies indicates:  No Known Allergies  Past Medical History:   Diagnosis Date    Compensated HCV cirrhosis     Depression     HTN (hypertension)     Hx of colonic polyp     Situational depression     Thrombocytopenia, unspecified     Tobacco user      OB History    Para Term  AB Living   4 4           SAB IAB Ectopic Multiple Live Births                  # Outcome Date GA Lbr Caden/2nd Weight Sex Delivery Anes PTL Lv   4 Para            3 Para            2 Para            1 Para                 Review of Systems  Review of Systems    Negative except for pertinent findings for positives per HPI     Objective:    Physical Exam    BP (!) 153/83 (BP Location: Right arm, Patient Position: Sitting, BP Method: Medium (Automatic))   Pulse 75   Temp 97.9 °F (36.6 °C) (Oral)   Resp 20   Ht 4' 11" (1.499 m)   Wt 58.4 kg (128 lb 12.8 oz)   SpO2 100%   BMI 26.01 kg/m²   GENERAL: Well-developed female. No acute distress.    SKIN: Normal to inspection, warm and intact.  BREASTS: No rashes or erythema. No masses, lumps, discharge, tenderness.  ABDOMEN: Soft, non tender.  VULVA: General appearance normal; external " genitalia with no lesions or erythema.  BLADDER: No tenderness.  VAGINA: Mucosa/vaginal vault atrophic, no abnormal discharge or lesions.  CERVIX: Atrophic, nulliparous appearing os, no erythema or abnormal discharge.  BIMANUAL EXAM: reveals a 8 week-sized uterus. The uterus is non tender. Dc adnexa reveal no tenderness.  PSYCHIATRIC: Patient is oriented to person, place, and time. Mood and affect are normal.    Assessment:       1. Encounter for annual routine gynecological examination    2. Atrophic vaginitis    3. Tobacco use       Plan:   Paula was seen today for gynecologic exam.    Diagnoses and all orders for this visit:    Encounter for annual routine gynecological examination    Atrophic vaginitis  -     conjugated estrogens (PREMARIN) vaginal cream; Place 0.5 g vaginally twice a week. Apply dime size to gloved finger and apply to vaginal walls 2x/week.    Tobacco use    Pelvic today, pap utd per ACOG  Water base lubricant with all sexual encounters. Start Estrogen cream 2x/week for vaginal dryness.  Smoking cessation counseling provided. Instructed on smoking cessation program through [facility] and pharmacological interventions to aid in cessation  Follow up in about 1 year (around 10/11/2024) for annual exam.

## 2023-10-13 ENCOUNTER — HOSPITAL ENCOUNTER (OUTPATIENT)
Dept: RADIOLOGY | Facility: HOSPITAL | Age: 68
Discharge: HOME | End: 2023-10-13
Attending: NURSE PRACTITIONER
Payer: MEDICARE

## 2023-10-13 DIAGNOSIS — Z12.31 BREAST CANCER SCREENING BY MAMMOGRAM: ICD-10-CM

## 2023-10-13 DIAGNOSIS — Z78.0 POSTMENOPAUSAL: ICD-10-CM

## 2023-10-13 PROCEDURE — 77067 SCR MAMMO BI INCL CAD: CPT | Mod: TC

## 2023-10-13 PROCEDURE — 77063 MAMMO DIGITAL SCREENING BILAT WITH TOMO: ICD-10-PCS | Mod: 26,,, | Performed by: RADIOLOGY

## 2023-10-13 PROCEDURE — 77067 MAMMO DIGITAL SCREENING BILAT WITH TOMO: ICD-10-PCS | Mod: 26,,, | Performed by: RADIOLOGY

## 2023-10-13 PROCEDURE — 77080 DXA BONE DENSITY AXIAL: CPT | Mod: TC

## 2023-10-13 PROCEDURE — 77063 BREAST TOMOSYNTHESIS BI: CPT | Mod: 26,,, | Performed by: RADIOLOGY

## 2023-10-13 PROCEDURE — 77067 SCR MAMMO BI INCL CAD: CPT | Mod: 26,,, | Performed by: RADIOLOGY

## 2023-10-18 ENCOUNTER — OFFICE VISIT (OUTPATIENT)
Dept: INTERNAL MEDICINE | Facility: CLINIC | Age: 68
End: 2023-10-18
Payer: MEDICARE

## 2023-10-18 ENCOUNTER — HOSPITAL ENCOUNTER (OUTPATIENT)
Dept: RADIOLOGY | Facility: HOSPITAL | Age: 68
Discharge: HOME | End: 2023-10-18
Attending: NURSE PRACTITIONER
Payer: MEDICARE

## 2023-10-18 VITALS
HEIGHT: 59 IN | SYSTOLIC BLOOD PRESSURE: 130 MMHG | HEART RATE: 74 BPM | WEIGHT: 126 LBS | RESPIRATION RATE: 16 BRPM | TEMPERATURE: 98 F | BODY MASS INDEX: 25.4 KG/M2 | DIASTOLIC BLOOD PRESSURE: 74 MMHG

## 2023-10-18 DIAGNOSIS — K74.69 COMPENSATED HCV CIRRHOSIS: ICD-10-CM

## 2023-10-18 DIAGNOSIS — I10 HYPERTENSION, UNSPECIFIED TYPE: ICD-10-CM

## 2023-10-18 DIAGNOSIS — R21 RASH AND OTHER NONSPECIFIC SKIN ERUPTION: ICD-10-CM

## 2023-10-18 DIAGNOSIS — B19.20 COMPENSATED HCV CIRRHOSIS: ICD-10-CM

## 2023-10-18 DIAGNOSIS — Z23 NEED FOR INFLUENZA VACCINATION: ICD-10-CM

## 2023-10-18 DIAGNOSIS — E55.9 VITAMIN D DEFICIENCY: ICD-10-CM

## 2023-10-18 DIAGNOSIS — G89.29 CHRONIC RIGHT SHOULDER PAIN: ICD-10-CM

## 2023-10-18 DIAGNOSIS — M25.511 CHRONIC RIGHT SHOULDER PAIN: ICD-10-CM

## 2023-10-18 DIAGNOSIS — F17.210 CIGARETTE NICOTINE DEPENDENCE WITHOUT COMPLICATION: ICD-10-CM

## 2023-10-18 PROCEDURE — 1125F PR PAIN SEVERITY QUANTIFIED, PAIN PRESENT: ICD-10-PCS | Mod: CPTII,,, | Performed by: NURSE PRACTITIONER

## 2023-10-18 PROCEDURE — 3078F PR MOST RECENT DIASTOLIC BLOOD PRESSURE < 80 MM HG: ICD-10-PCS | Mod: CPTII,,, | Performed by: NURSE PRACTITIONER

## 2023-10-18 PROCEDURE — 99214 PR OFFICE/OUTPT VISIT, EST, LEVL IV, 30-39 MIN: ICD-10-PCS | Mod: S$PBB,25,, | Performed by: NURSE PRACTITIONER

## 2023-10-18 PROCEDURE — 3288F FALL RISK ASSESSMENT DOCD: CPT | Mod: CPTII,,, | Performed by: NURSE PRACTITIONER

## 2023-10-18 PROCEDURE — 3078F DIAST BP <80 MM HG: CPT | Mod: CPTII,,, | Performed by: NURSE PRACTITIONER

## 2023-10-18 PROCEDURE — 1101F PR PT FALLS ASSESS DOC 0-1 FALLS W/OUT INJ PAST YR: ICD-10-PCS | Mod: CPTII,,, | Performed by: NURSE PRACTITIONER

## 2023-10-18 PROCEDURE — 1160F PR REVIEW ALL MEDS BY PRESCRIBER/CLIN PHARMACIST DOCUMENTED: ICD-10-PCS | Mod: CPTII,,, | Performed by: NURSE PRACTITIONER

## 2023-10-18 PROCEDURE — 99406 BEHAV CHNG SMOKING 3-10 MIN: CPT | Mod: S$PBB,,, | Performed by: NURSE PRACTITIONER

## 2023-10-18 PROCEDURE — 99215 OFFICE O/P EST HI 40 MIN: CPT | Mod: PBBFAC | Performed by: NURSE PRACTITIONER

## 2023-10-18 PROCEDURE — 1160F RVW MEDS BY RX/DR IN RCRD: CPT | Mod: CPTII,,, | Performed by: NURSE PRACTITIONER

## 2023-10-18 PROCEDURE — 3008F BODY MASS INDEX DOCD: CPT | Mod: CPTII,,, | Performed by: NURSE PRACTITIONER

## 2023-10-18 PROCEDURE — 99214 OFFICE O/P EST MOD 30 MIN: CPT | Mod: S$PBB,25,, | Performed by: NURSE PRACTITIONER

## 2023-10-18 PROCEDURE — 3075F SYST BP GE 130 - 139MM HG: CPT | Mod: CPTII,,, | Performed by: NURSE PRACTITIONER

## 2023-10-18 PROCEDURE — 1125F AMNT PAIN NOTED PAIN PRSNT: CPT | Mod: CPTII,,, | Performed by: NURSE PRACTITIONER

## 2023-10-18 PROCEDURE — 90694 VACC AIIV4 NO PRSRV 0.5ML IM: CPT | Mod: PBBFAC

## 2023-10-18 PROCEDURE — 1159F MED LIST DOCD IN RCRD: CPT | Mod: CPTII,,, | Performed by: NURSE PRACTITIONER

## 2023-10-18 PROCEDURE — 1159F PR MEDICATION LIST DOCUMENTED IN MEDICAL RECORD: ICD-10-PCS | Mod: CPTII,,, | Performed by: NURSE PRACTITIONER

## 2023-10-18 PROCEDURE — 3288F PR FALLS RISK ASSESSMENT DOCUMENTED: ICD-10-PCS | Mod: CPTII,,, | Performed by: NURSE PRACTITIONER

## 2023-10-18 PROCEDURE — 99406 PR TOBACCO USE CESSATION INTERMEDIATE 3-10 MINUTES: ICD-10-PCS | Mod: S$PBB,,, | Performed by: NURSE PRACTITIONER

## 2023-10-18 PROCEDURE — 3008F PR BODY MASS INDEX (BMI) DOCUMENTED: ICD-10-PCS | Mod: CPTII,,, | Performed by: NURSE PRACTITIONER

## 2023-10-18 PROCEDURE — 3075F PR MOST RECENT SYSTOLIC BLOOD PRESS GE 130-139MM HG: ICD-10-PCS | Mod: CPTII,,, | Performed by: NURSE PRACTITIONER

## 2023-10-18 PROCEDURE — G0008 ADMIN INFLUENZA VIRUS VAC: HCPCS | Mod: PBBFAC

## 2023-10-18 PROCEDURE — 1101F PT FALLS ASSESS-DOCD LE1/YR: CPT | Mod: CPTII,,, | Performed by: NURSE PRACTITIONER

## 2023-10-18 PROCEDURE — 4010F ACE/ARB THERAPY RXD/TAKEN: CPT | Mod: CPTII,,, | Performed by: NURSE PRACTITIONER

## 2023-10-18 PROCEDURE — 4010F PR ACE/ARB THEARPY RXD/TAKEN: ICD-10-PCS | Mod: CPTII,,, | Performed by: NURSE PRACTITIONER

## 2023-10-18 PROCEDURE — 73030 X-RAY EXAM OF SHOULDER: CPT | Mod: TC,RT

## 2023-10-18 RX ORDER — FLUOXETINE HYDROCHLORIDE 40 MG/1
40 CAPSULE ORAL DAILY
Qty: 90 CAPSULE | Refills: 1 | Status: SHIPPED | OUTPATIENT
Start: 2023-10-18 | End: 2024-03-12 | Stop reason: SDUPTHER

## 2023-10-18 RX ORDER — BETAMETHASONE DIPROPIONATE 0.5 MG/G
CREAM TOPICAL 2 TIMES DAILY PRN
COMMUNITY
Start: 2023-10-12 | End: 2023-10-18

## 2023-10-18 RX ORDER — LOSARTAN POTASSIUM 25 MG/1
25 TABLET ORAL DAILY
Qty: 90 TABLET | Refills: 1 | Status: SHIPPED | OUTPATIENT
Start: 2023-10-18 | End: 2024-03-12 | Stop reason: SDUPTHER

## 2023-10-18 RX ORDER — BACLOFEN 10 MG/1
10 TABLET ORAL 3 TIMES DAILY
Qty: 42 TABLET | Refills: 0 | Status: SHIPPED | OUTPATIENT
Start: 2023-10-18 | End: 2024-03-12

## 2023-10-18 RX ORDER — TRIAMCINOLONE ACETONIDE 1 MG/G
OINTMENT TOPICAL 2 TIMES DAILY
Qty: 80 G | Refills: 1 | Status: SHIPPED | OUTPATIENT
Start: 2023-10-18

## 2023-10-18 RX ORDER — MELOXICAM 7.5 MG/1
7.5 TABLET ORAL EVERY 12 HOURS PRN
COMMUNITY
Start: 2023-10-12 | End: 2024-03-12

## 2023-10-18 RX ORDER — HYDROXYZINE PAMOATE 25 MG/1
CAPSULE ORAL
Qty: 45 CAPSULE | Refills: 2 | Status: SHIPPED | OUTPATIENT
Start: 2023-10-18 | End: 2024-01-25 | Stop reason: SDUPTHER

## 2023-10-18 RX ORDER — POTASSIUM CHLORIDE 750 MG/1
10 TABLET, EXTENDED RELEASE ORAL DAILY
Qty: 90 TABLET | Refills: 1 | Status: SHIPPED | OUTPATIENT
Start: 2023-10-18 | End: 2024-03-12 | Stop reason: SDUPTHER

## 2023-10-18 RX ADMIN — INFLUENZA A VIRUS A/VICTORIA/4897/2022 IVR-238 (H1N1) ANTIGEN (FORMALDEHYDE INACTIVATED), INFLUENZA A VIRUS A/DARWIN/6/2021 IVR-227 (H3N2) ANTIGEN (FORMALDEHYDE INACTIVATED), INFLUENZA B VIRUS B/AUSTRIA/1359417/2021 BVR-26 ANTIGEN (FORMALDEHYDE INACTIVATED), INFLUENZA B VIRUS B/PHUKET/3073/2013 BVR-1B ANTIGEN (FORMALDEHYDE INACTIVATED) 0.5 ML: 15; 15; 15; 15 INJECTION, SUSPENSION INTRAMUSCULAR at 01:10

## 2023-10-18 NOTE — PROGRESS NOTES
Internal Medicine Clinic  KATHARINE oConey     Patient Name: Paula Vilchis   : 1955  MRN:[id number]     Chief Complaint     Chief Complaint   Patient presents with    Follow-up     Right shoulder pain x 2 weeks  Rash to lower extremities x 3 weeks        History of Present Illness     67 year old AAF, presents in clinic for lab review. C/o RT shoulder pain, no relief with heat or massage. Onset 2 wks. C/o rash to phoebe lower legs x 3 wks with itching. Seen in [facility] twice for rash, given topical steroid and topical abx,plus oral steroids without relief.    PMH HTN, HEP C Treated , Liver Cirrhosis, Neutropenia, thrombocytopenia (likely from hep Cirrhosis), mixed hearing loss, positive RF & CCP, tobacco user (Smokes 1-2 cigars per day), alcohol use, daily marijuana use, stuttering.   Previously with ID clinic, finished 12 week course of Epclusa on 2020, undetectable viral load. Following [facility] GI clinic for MELD-Na score 12 Child-L[place] Class B (score 7). Pt is completely asymptomatic at this time. ABD US 2022 Coarse heterogeneous and nodular appearing liver consistent the patient's history of cirrhosis. Status post cholecystectomy       Prior ECHO (abd US 2020; displayed Prominent inferior vena cava measuring up to 2.9 cm in AP diameter at the level of the liver.  Repeat ECHO performed on 2021, and displayed EF 55%, grade 1 diastolic dysfunction, Aortic valve sclerosis without stenosis, mild to moderate concentric left ventricular hypertrophy, mild mitral and tricuspid regurgitation. Referred to [facility] Cardio clinic 10/26/2021, apt pending. Following Dr. Mon Rheum for +RF, +CCP and multiple joint pain. Denies CP, SOB, HA, easy bruising, bleeding, epistaxis, gum bleeding, GI/ bleeding, bone pain, night sweats, Vomiting, diarrhea, constipation, tammy colored stools, fatigue, weakness, abd pain, fever, icterus.     EGD normal 2022 Dr. Cavazos. Repeat EGD .  Following [facility] GI clinic,  "Dr. Segovia,HEP C treatment; 12 weeks of Epclusa. She completed tx 5/11/2020.   DEXA 11/10/2021 normal.  MMG 2/28/2022: BIRADS 1  [place]-guard neg 3/15/23            Review of Systems     Review of Systems   Constitutional: Negative.    HENT: Negative.     Eyes: Negative.    Respiratory: Negative.     Cardiovascular: Negative.    Gastrointestinal: Negative.    Endocrine: Negative.    Genitourinary: Negative.    Musculoskeletal:  Positive for arthralgias.        Right shoulder pain   Integumentary:  Positive for rash.        Dc legs   Allergic/Immunologic: Negative.    Neurological: Negative.    Hematological: Negative.    Psychiatric/Behavioral: Negative.     All other systems reviewed and are negative.       Physical Examination     Visit Vitals  /74 (BP Location: Left arm, Patient Position: Sitting, BP Method: Medium (Manual))   Pulse 74   Temp 97.9 °F (36.6 °C) (Oral)   Resp 16   Ht 4' 11" (1.499 m)   Wt 57.2 kg (126 lb)   BMI 25.45 kg/m²        BP Readings from Last 6 Encounters:   10/18/23 130/74   10/11/23 (!) 153/83   08/21/23 (!) 152/84   08/06/23 123/77   05/22/23 (!) 157/86   03/22/23 124/76   ]    Wt Readings from Last 6 Encounters:   10/18/23 57.2 kg (126 lb)   10/11/23 58.4 kg (128 lb 12.8 oz)   08/21/23 55.8 kg (123 lb)   08/06/23 55.3 kg (122 lb)   05/22/23 57.6 kg (127 lb)   03/22/23 58 kg (127 lb 12.8 oz)   ]      Physical Exam  Vitals and nursing note reviewed.   Constitutional:       Appearance: Normal appearance.   HENT:      Head: Normocephalic and atraumatic.      Right Ear: Tympanic membrane, ear canal and external ear normal.      Left Ear: Tympanic membrane, ear canal and external ear normal.      Nose: Nose normal.      Mouth/Throat:      Mouth: Mucous membranes are moist.      Pharynx: Oropharynx is clear.   Eyes:      Extraocular Movements: Extraocular movements intact.      Conjunctiva/sclera: Conjunctivae normal.      Pupils: Pupils are equal, round, and reactive to light. "   Cardiovascular:      Rate and Rhythm: Normal rate and regular rhythm.      Pulses: Normal pulses.      Heart sounds: Normal heart sounds.   Pulmonary:      Effort: Pulmonary effort is normal.      Breath sounds: Normal breath sounds.   Abdominal:      General: Abdomen is flat. Bowel sounds are normal.      Palpations: Abdomen is soft.   Musculoskeletal:         General: Tenderness present. Normal range of motion.      Cervical back: Normal range of motion and neck supple.      Comments: Left shoulder   Skin:     General: Skin is warm and dry.      Capillary Refill: Capillary refill takes less than 2 seconds.      Findings: Lesion and rash present.      Comments: Skin Plaque phoebe legs, see pictures in media   Neurological:      General: No focal deficit present.      Mental Status: She is alert and oriented to person, place, and time. Mental status is at baseline.   Psychiatric:         Mood and Affect: Mood normal.         Behavior: Behavior normal.         Thought Content: Thought content normal.         Judgment: Judgment normal.          Labs / Imaging     Chemistry:  Lab Results   Component Value Date     08/10/2023    K 4.3 08/10/2023    CHLORIDE 105 08/10/2023    BUN 9.0 (L) 08/10/2023    CREATININE 0.58 08/10/2023    EGFRNORACEVR >60 08/10/2023    GLUCOSE 88 08/10/2023    CALCIUM 8.4 08/10/2023    ALKPHOS 144 08/10/2023    LABPROT 7.6 08/10/2023    ALBUMIN 2.9 (L) 08/10/2023    BILIDIR 0.6 01/28/2022    IBILI 0.70 01/28/2022    AST 51 (H) 08/10/2023    ALT 26 08/10/2023    UHCOUZIC47NH 23.3 (L) 08/10/2023        Lab Results   Component Value Date    HGBA1C 4.2 07/17/2018        Hematology:  Lab Results   Component Value Date    WBC 2.33 (L) 08/10/2023    RBC 4.02 (L) 08/10/2023    HGB 11.7 (L) 08/10/2023    HCT 36.3 (L) 08/10/2023    MCV 90.3 08/10/2023    MCH 29.1 08/10/2023    MCHC 32.2 (L) 08/10/2023    RDW 14.6 08/10/2023     (L) 08/10/2023    MPV 10.9 (H) 08/10/2023        Lipid Panel:  Lab  Results   Component Value Date    CHOL 127 08/10/2023    HDL 37 08/10/2023    LDL 72.00 08/10/2023    TRIG 88 08/10/2023    TOTALCHOLEST 3 08/10/2023        Urine:  Lab Results   Component Value Date    COLORUA Yellow 02/01/2023    APPEARANCEUA Clear 02/01/2023    SGUA 1.016 02/01/2023    PHUA 7.0 02/01/2023    PROTEINUA Negative 02/01/2023    GLUCOSEUA Normal 02/01/2023    KETONESUA Negative 02/01/2023    BLOODUA Negative 02/01/2023    NITRITESUA Negative 02/01/2023    LEUKOCYTESUR 25 (A) 02/01/2023    RBCUA 0-5 02/01/2023    WBCUA 0-5 02/01/2023    BACTERIA None Seen 02/01/2023    SQEPUA Occ (A) 02/01/2023    HYALINECASTS None Seen 02/01/2023    CREATRANDUR 102.5 02/01/2023    PROTEINURINE 12.5 02/01/2023    UPROTCREA 0.1 02/01/2023          Assessment       ICD-10-CM ICD-9-CM   1. Hypertension, unspecified type  I10 401.9   2. Chronic right shoulder pain  M25.511 719.41    G89.29 338.29   3. Rash and other nonspecific skin eruption  R21 782.1   4. Cigarette nicotine dependence without complication  F17.210 305.1   5. Need for influenza vaccination  Z23 V04.81   6. Compensated HCV cirrhosis  K74.69 571.5    B19.20 070.54   7. BMI 25.0-25.9,adult  Z68.25 V85.21   8. Vitamin D deficiency  E55.9 268.9        Plan     1. Hypertension, unspecified type  BP and HR stable. Med refills. DASH diet: Eat more fruits, vegetables, and low fat dairy foods.  (Less than 2 grams of sodium per day).  Maintain healthy weight with goal BMI <30.   Exercise 30 minutes per day 5 days per week.  Home medications refilled and continued.   Home BP monitoring encouraged with BP parameters given.    - CBC Auto Differential; Future  - Comprehensive Metabolic Panel; Future  - Lipid Panel; Future  - Urinalysis; Future  - Vitamin D; Future  - Urinalysis    2. Chronic right shoulder pain  Conservative therapy (Protect from further injury, relative rest, Ice or Heat, Sling, Stretches and Strengthening exercises).   Rx baclofen  - X-ray Shoulder 2  or More Views Right; Future    3. Rash and other nonspecific skin eruption  Refer to FM minor sx clinic; see pic  RX kenalog  Aquaphor OTC  - Ambulatory referral/consult to Family Practice; Future    4. Cigarette nicotine dependence without complication  Smoking cessation advised. Instructed on smoking cessation program through [facility] and pharmacological interventions to aid in cessation.  >5 minutes allotted to educate patient on the harms of smoking, the urgency to quit, and the development of a plan for smoking cessation.     5. Need for influenza vaccination  Admin today  - influenza 65up-adj (QUADRIVALENT ADJUVANTED PF) vaccine 0.5 mL    6. Compensated HCV cirrhosis  Following GI/hep C clinic  - Comprehensive Metabolic Panel; Future    7. BMI 25.0-25.9,adult  Goal BMI <30.  Exercise 5 times a week for 30 minutes per day.  Avoid soda, simple sugars, excessive rice, potatoes or bread. Limit fast foods and fried foods.  Choose complex carbs in moderation (example: green vegetables, beans, oatmeal). Eat plenty of fresh fruits and vegetables with lean meats daily.  Do not skip meals. Eat a balanced portion size.  Avoid fad diets. Consider permanent healthy life style changes.       8. Vitamin D deficiency  Continue OTC Vitamin D3 2000 IU daily.    - Vitamin D; Future         Current Outpatient Medications   Medication Instructions    baclofen (LIORESAL) 10 mg, Oral, 3 times daily    conjugated estrogens (PREMARIN) 0.5 g, Vaginal, Twice weekly, Apply dime size to gloved finger and apply to vaginal walls 2x/week.    diclofenac sodium (VOLTAREN) 1 % Gel SMARTSI Gram(s) Topical 4 Times Daily PRN    docusate sodium (STOOL SOFTENER) 100 mg, Oral, Daily    famotidine (PEPCID) 20 mg, Oral, Nightly PRN    FLUoxetine 40 mg, Oral, Daily    hydrOXYzine pamoate (VISTARIL) 25 MG Cap TAKE ONE CAPSULE BY MOUTH 4 TIMES A DAY AS NEEDED FOR ANXIETY    losartan (COZAAR) 25 mg, Oral, Daily    meloxicam (MOBIC) 7.5 mg, Oral, Every 12  hours PRN    multivitamin (THERAGRAN) per tablet 1 tablet, Oral, Daily    pantoprazole (PROTONIX) 40 MG tablet TAKE ONE TABLET BY MOUTH ONCE DAILY FOR THE STOMACH.    potassium chloride SA (K-DUR,KLOR-CON M) 10 MEQ tablet 10 mEq, Oral, Daily    triamcinolone acetonide 0.1% (KENALOG) 0.1 % ointment Topical (Top), 2 times daily       Orders Placed This Encounter   Procedures    X-ray Shoulder 2 or More Views Right    CBC Auto Differential    Comprehensive Metabolic Panel    Lipid Panel    Urinalysis    Vitamin D    Ambulatory referral/consult to Family Practice         Future Appointments   Date Time Provider Department Center   11/9/2023 10:00 AM Char Mclaughlin, APRN [facility]   11/14/2023  9:00 AM [facility] MAMMO1 [facility] MAMMO [place]    11/14/2023  9:30 AM [facility] MAMMO3 US1 [facility] MAMMO [place]    11/27/2023  1:00 PM Aime Segovia MD [facility] GASTRO [place]    1/3/2024  9:30 AM Colleen Mon MD [facility] RHEU [place]    1/18/2024  9:00 AM Nancy Rebolledo FNP [facility] INTMED [place]    10/16/2024  8:50 AM Jennifer Rodas, JEREMY [facility] GYN [place]         Follow up in about 3 months (around 1/18/2024) for lab review.    Labs thoroughly reviewed with patient. Medication refills addressed today.  RTC prn and 3 months, with labs 1 week prior to the apt.  COVID 19 precautions given to patient.  Patient voices understanding of all discharge instructions.      KATHARINE Cooney

## 2023-10-31 ENCOUNTER — TELEPHONE (OUTPATIENT)
Dept: INTERNAL MEDICINE | Facility: CLINIC | Age: 68
End: 2023-10-31
Payer: MEDICARE

## 2023-10-31 DIAGNOSIS — G89.29 CHRONIC RIGHT SHOULDER PAIN: Primary | ICD-10-CM

## 2023-10-31 DIAGNOSIS — M25.511 CHRONIC RIGHT SHOULDER PAIN: Primary | ICD-10-CM

## 2023-10-31 NOTE — TELEPHONE ENCOUNTER
Patient called requesting her x-ray results from her shoulder. Patient also stated it's still bothering her. Please advise.

## 2023-10-31 NOTE — TELEPHONE ENCOUNTER
Xray shows mild arthritis findings, I have referred her to [facility] ORTHO clinic. Please give her ortho's number as well. Conservative therapy (Protect from further injury, relative rest, Ice or Heat, Sling, Stretches and Strengthening exercises).   Rx baclofen  thanks

## 2023-11-14 ENCOUNTER — HOSPITAL ENCOUNTER (OUTPATIENT)
Dept: RADIOLOGY | Facility: HOSPITAL | Age: 68
Discharge: HOME | End: 2023-11-14
Attending: NURSE PRACTITIONER
Payer: MEDICARE

## 2023-11-14 DIAGNOSIS — R92.8 ABNORMAL MAMMOGRAM: ICD-10-CM

## 2023-11-14 PROCEDURE — 76642 US BREAST RIGHT LIMITED: ICD-10-PCS | Mod: 26,RT,, | Performed by: RADIOLOGY

## 2023-11-14 PROCEDURE — 77061 MAMMO DIGITAL DIAGNOSTIC RIGHT WITH TOMO: ICD-10-PCS | Mod: 26,RT,, | Performed by: RADIOLOGY

## 2023-11-14 PROCEDURE — 77065 MAMMO DIGITAL DIAGNOSTIC RIGHT WITH TOMO: ICD-10-PCS | Mod: 26,RT,, | Performed by: RADIOLOGY

## 2023-11-14 PROCEDURE — 77065 DX MAMMO INCL CAD UNI: CPT | Mod: 26,RT,, | Performed by: RADIOLOGY

## 2023-11-14 PROCEDURE — 77061 BREAST TOMOSYNTHESIS UNI: CPT | Mod: 26,RT,, | Performed by: RADIOLOGY

## 2023-11-14 PROCEDURE — 76642 ULTRASOUND BREAST LIMITED: CPT | Mod: 26,RT,, | Performed by: RADIOLOGY

## 2023-11-14 PROCEDURE — 77061 BREAST TOMOSYNTHESIS UNI: CPT | Mod: TC,RT

## 2023-11-14 PROCEDURE — 76642 ULTRASOUND BREAST LIMITED: CPT | Mod: TC,RT

## 2024-01-25 DIAGNOSIS — F32.A ANXIETY AND DEPRESSION: Primary | ICD-10-CM

## 2024-01-25 DIAGNOSIS — F41.9 ANXIETY AND DEPRESSION: Primary | ICD-10-CM

## 2024-01-25 RX ORDER — HYDROXYZINE PAMOATE 25 MG/1
CAPSULE ORAL
Qty: 45 CAPSULE | Refills: 2 | Status: SHIPPED | OUTPATIENT
Start: 2024-01-25 | End: 2024-03-12 | Stop reason: SDUPTHER

## 2024-01-25 NOTE — TELEPHONE ENCOUNTER
Pharmacy faxed request refill. Please advise. Thanks    LCV-10/18/23  NS-1/18/24    *No upcoming appts.

## 2024-02-07 ENCOUNTER — OFFICE VISIT (OUTPATIENT)
Dept: ORTHOPEDICS | Facility: CLINIC | Age: 69
End: 2024-02-07
Payer: MEDICARE

## 2024-02-07 ENCOUNTER — HOSPITAL ENCOUNTER (OUTPATIENT)
Dept: RADIOLOGY | Facility: CLINIC | Age: 69
Discharge: HOME | End: 2024-02-07
Attending: ORTHOPAEDIC SURGERY
Payer: MEDICARE

## 2024-02-07 ENCOUNTER — TELEPHONE (OUTPATIENT)
Dept: ORTHOPEDICS | Facility: CLINIC | Age: 69
End: 2024-02-07

## 2024-02-07 VITALS
HEIGHT: 59 IN | HEART RATE: 85 BPM | SYSTOLIC BLOOD PRESSURE: 155 MMHG | TEMPERATURE: 98 F | WEIGHT: 125 LBS | DIASTOLIC BLOOD PRESSURE: 90 MMHG | BODY MASS INDEX: 25.2 KG/M2

## 2024-02-07 DIAGNOSIS — M25.511 ACUTE PAIN OF RIGHT SHOULDER: ICD-10-CM

## 2024-02-07 DIAGNOSIS — M19.011 PRIMARY OSTEOARTHRITIS OF RIGHT SHOULDER: Primary | ICD-10-CM

## 2024-02-07 PROCEDURE — 1125F AMNT PAIN NOTED PAIN PRSNT: CPT | Mod: CPTII,,, | Performed by: ORTHOPAEDIC SURGERY

## 2024-02-07 PROCEDURE — 99203 OFFICE O/P NEW LOW 30 MIN: CPT | Mod: 25,,, | Performed by: ORTHOPAEDIC SURGERY

## 2024-02-07 PROCEDURE — 73030 X-RAY EXAM OF SHOULDER: CPT | Mod: RT,,, | Performed by: ORTHOPAEDIC SURGERY

## 2024-02-07 PROCEDURE — 3077F SYST BP >= 140 MM HG: CPT | Mod: CPTII,,, | Performed by: ORTHOPAEDIC SURGERY

## 2024-02-07 PROCEDURE — 1159F MED LIST DOCD IN RCRD: CPT | Mod: CPTII,,, | Performed by: ORTHOPAEDIC SURGERY

## 2024-02-07 PROCEDURE — 3080F DIAST BP >= 90 MM HG: CPT | Mod: CPTII,,, | Performed by: ORTHOPAEDIC SURGERY

## 2024-02-07 PROCEDURE — 1101F PT FALLS ASSESS-DOCD LE1/YR: CPT | Mod: CPTII,,, | Performed by: ORTHOPAEDIC SURGERY

## 2024-02-07 PROCEDURE — 20610 DRAIN/INJ JOINT/BURSA W/O US: CPT | Mod: RT,,, | Performed by: NURSE PRACTITIONER

## 2024-02-07 PROCEDURE — 3288F FALL RISK ASSESSMENT DOCD: CPT | Mod: CPTII,,, | Performed by: ORTHOPAEDIC SURGERY

## 2024-02-07 PROCEDURE — 4010F ACE/ARB THERAPY RXD/TAKEN: CPT | Mod: CPTII,,, | Performed by: ORTHOPAEDIC SURGERY

## 2024-02-07 PROCEDURE — 3008F BODY MASS INDEX DOCD: CPT | Mod: CPTII,,, | Performed by: ORTHOPAEDIC SURGERY

## 2024-02-07 RX ORDER — MELOXICAM 7.5 MG/1
7.5 TABLET ORAL DAILY
Qty: 14 TABLET | Refills: 2 | OUTPATIENT
Start: 2024-02-07 | End: 2024-06-05

## 2024-02-07 RX ORDER — METHYLPREDNISOLONE ACETATE 80 MG/ML
80 INJECTION, SUSPENSION INTRA-ARTICULAR; INTRALESIONAL; INTRAMUSCULAR; SOFT TISSUE
Status: DISCONTINUED | OUTPATIENT
Start: 2024-02-07 | End: 2024-02-07 | Stop reason: HOSPADM

## 2024-02-07 RX ORDER — LIDOCAINE HYDROCHLORIDE 20 MG/ML
5 INJECTION, SOLUTION EPIDURAL; INFILTRATION; INTRACAUDAL; PERINEURAL
Status: DISCONTINUED | OUTPATIENT
Start: 2024-02-07 | End: 2024-02-07 | Stop reason: HOSPADM

## 2024-02-07 RX ADMIN — LIDOCAINE HYDROCHLORIDE 5 ML: 20 INJECTION, SOLUTION EPIDURAL; INFILTRATION; INTRACAUDAL; PERINEURAL at 10:02

## 2024-02-07 RX ADMIN — METHYLPREDNISOLONE ACETATE 80 MG: 80 INJECTION, SUSPENSION INTRA-ARTICULAR; INTRALESIONAL; INTRAMUSCULAR; SOFT TISSUE at 10:02

## 2024-02-07 NOTE — PROCEDURES
Large Joint Aspiration/Injection: R subacromial bursa    Date/Time: 2/7/2024 10:30 AM    Performed by: Penny Brannon FNP  Authorized by: Davion Aguilera Jr., MD    Consent Done?:  Yes (Verbal)  Indications:  Pain and arthritis  Site marked: the procedure site was marked    Timeout: prior to procedure the correct patient, procedure, and site was verified    Prep: patient was prepped and draped in usual sterile fashion      Local anesthesia used?: Yes    Local anesthetic:  Topical anesthetic    Details:  Needle Size:  21 G  Ultrasonic Guidance for needle placement?: No    Approach:  Posterior  Location:  Shoulder  Site:  R subacromial bursa  Medications:  5 mL LIDOcaine (PF) 20 mg/mL (2%) 20 mg/mL (2 %); 80 mg methylPREDNISolone acetate 80 mg/mL  Patient tolerance:  Patient tolerated the procedure well with no immediate complications

## 2024-02-07 NOTE — TELEPHONE ENCOUNTER
Patient stated she had just taken her BP medication just before BP was taken. States she monitors at home and is always good.

## 2024-02-07 NOTE — PROGRESS NOTES
Chief Complaint:   Chief Complaint   Patient presents with    Right Shoulder - Pain    Pain     Patient here with Right shoulder pain x3-4 months. X-rays done. Has tried medication, message and heat. C/o Right shoulder into scapula pain that radiates into bicep.        Consulting Physician: No ref. provider found    History of present illness:    she is a pleasant 68 y.o. year old female who has had prolonged history of right shoulder pain but increasing since 2023.  The pain is located globally around the shoulder.  She knows it worse with activity.  It is somewhat better at rest.  She is tried anti-inflammatory medications with Mobic.  She is tried some Voltaren cream.  She is tried heat and massage.  Unfortunately her pain has persisted.    Past Medical History:   Diagnosis Date    Compensated HCV cirrhosis     Depression     HTN (hypertension)     Hx of colonic polyp     Situational depression     Thrombocytopenia, unspecified     Tobacco user        Past Surgical History:   Procedure Laterality Date     SECTION       SECTION       SECTION       SECTION      Drainage of Foot Skin, External Approach Right 2017    Draingage of Foot Skin, External Approach Right 2017    HERNIA REPAIR      Incision and Drainage of Abscess; complicated or multiple N/A 2017    Incision and Drainage of Hematoma, seroma or fluid collection N/A 2017       Current Outpatient Medications   Medication Sig    conjugated estrogens (PREMARIN) vaginal cream Place 0.5 g vaginally twice a week. Apply dime size to gloved finger and apply to vaginal walls 2x/week.    docusate sodium (STOOL SOFTENER) 100 MG capsule Take 100 mg by mouth once daily.    FLUoxetine 40 MG capsule Take 1 capsule (40 mg total) by mouth once daily.    hydrOXYzine pamoate (VISTARIL) 25 MG Cap TAKE ONE CAPSULE BY MOUTH 4 TIMES A DAY AS NEEDED FOR ANXIETY    losartan (COZAAR) 25 MG tablet Take 1 tablet (25 mg  total) by mouth once daily.    multivitamin (THERAGRAN) per tablet Take 1 tablet by mouth once daily.    pantoprazole (PROTONIX) 40 MG tablet TAKE ONE TABLET BY MOUTH ONCE DAILY FOR THE STOMACH.    potassium chloride SA (K-DUR,KLOR-CON M) 10 MEQ tablet Take 1 tablet (10 mEq total) by mouth once daily.    triamcinolone acetonide 0.1% (KENALOG) 0.1 % ointment Apply topically 2 (two) times daily.    baclofen (LIORESAL) 10 MG tablet Take 1 tablet (10 mg total) by mouth 3 (three) times daily. for 14 days    diclofenac sodium (VOLTAREN) 1 % Gel SMARTSI Gram(s) Topical 4 Times Daily PRN    famotidine (PEPCID) 20 MG tablet Take 1 tablet (20 mg total) by mouth nightly as needed for Heartburn. (Patient not taking: Reported on 10/11/2023)    meloxicam (MOBIC) 7.5 MG tablet Take 7.5 mg by mouth every 12 (twelve) hours as needed.    meloxicam (MOBIC) 7.5 MG tablet Take 1 tablet (7.5 mg total) by mouth once daily.     No current facility-administered medications for this visit.       Review of patient's allergies indicates:  No Known Allergies    Family History   Problem Relation Age of Onset    Diabetes Father     Hypertension Father     Cancer Mother     Hypertension Mother     Stroke Paternal Uncle        Social History     Socioeconomic History    Marital status: Single    Number of children: 4   Occupational History    Occupation: Unemployed   Tobacco Use    Smoking status: Every Day     Types: Cigars, Vaping with nicotine     Start date: 1978    Smokeless tobacco: Never    Tobacco comments:     1 Black n Mild cigar a day   Substance and Sexual Activity    Alcohol use: Yes     Alcohol/week: 3.0 standard drinks of alcohol     Types: 3 Glasses of wine per week     Comment: Wine Occasionally    Drug use: Yes     Frequency: 4.0 times per week     Types: Marijuana    Sexual activity: Yes     Birth control/protection: Post-menopausal     Social Determinants of Health     Financial Resource Strain: High Risk (2023)     Overall Financial Resource Strain (CARDIA)     Difficulty of Paying Living Expenses: Hard   Food Insecurity: Food Insecurity Present (8/21/2023)    Hunger Vital Sign     Worried About Running Out of Food in the Last Year: Often true     Ran Out of Food in the Last Year: Sometimes true   Transportation Needs: Unmet Transportation Needs (8/21/2023)    PRAPARE - Transportation     Lack of Transportation (Medical): Yes     Lack of Transportation (Non-Medical): Yes   Physical Activity: Inactive (8/21/2023)    Exercise Vital Sign     Days of Exercise per Week: 0 days     Minutes of Exercise per Session: 0 min   Stress: Stress Concern Present (8/21/2023)    [employer] [place] - Occupational Stress Questionnaire     Feeling of Stress : Very much   Social Connections: Unknown (8/21/2023)    Social Connection and Isolation Panel [NHANES]     Frequency of Communication with Friends and Family: More than three times a week     Frequency of Social Gatherings with Friends and Family: Once a week     Attends Buddhist Services: Patient declined     Active Member of Clubs or Organizations: No     Marital Status: Never    Housing Stability: High Risk (8/21/2023)    Housing Stability Vital Sign     Unable to Pay for Housing in the Last Year: Yes     Number of Places Lived in the Last Year: 1     Unstable Housing in the Last Year: No       Review of Systems:    Constitution:   Denies chills, fever, and sweats.  HENT:   Denies headaches or blurry vision.  Cardiovascular:  Denies chest pain or irregular heart beat.  Respiratory:   Denies cough or shortness of breath.  Gastrointestinal:  Denies abdominal pain, nausea, or vomiting.  Musculoskeletal:   Denies muscle cramps.  Neurological:   Denies dizziness or focal weakness.  Psychiatric/Behavior: Normal mental status.  Hematology/Lymph:  Denies bleeding problem or easy bruising/bleeding.  Skin:    Denies rash or suspicious lesions.    Examination:    Vital  "Signs:    Vitals:    02/07/24 1117 02/07/24 1118   BP: (!) 155/90    Pulse: 85    Temp: 97.6 °F (36.4 °C)    Weight: 56.7 kg (125 lb)    Height: 4' 11.02" (1.499 m)    PainSc:    6       Body mass index is 25.23 kg/m².    Constitution:   Well-developed, well nourished patient in no acute distress.  Neurological:   Alert and oriented x 3 and cooperative to examination.     Psychiatric/Behavior: Normal mental status.  Respiratory:   No shortness of breath.  Cards:   Pulses palpable, brisk cap refill  Eyes:    Extraoccular muscles intact  Skin:    No scars, rash or suspicious lesions.    MSK:   Shoulder Exam:                   Right        Left  Skin:                                   Normal     Normal  AC joint tenderness:           None         None  Forward Flexion:                160            180  Abduction:                          100           180  External Rotation:               80              80  Internal Rotation:                80             80  Supraspinatus stress test: Neg           Neg  [place]'s Impingement:       +              Neg  Neer Impingement:              +           Neg  Apprehension:                   Neg           Neg  [place]'s:                             +           Neg  Speed's test:                     Neg            Neg  Strength:  External Rotation:           5/5                5/5  Lift Off/belly press:          5/5                5/5    N-V status:                   Intact             Intact    C-spine: Normal ROM, NT      Imaging: X-rays ordered and images interpreted today personally by me of four views of the right shoulder show mild to moderate glenohumeral arthrosis.         Assessment: Primary osteoarthritis of right shoulder  -     X-ray Shoulder 2 or More Views Right; Future; Expected date: 02/07/2024    Other orders  -     meloxicam (MOBIC) 7.5 MG tablet; Take 1 tablet (7.5 mg total) by mouth once daily.  Dispense: 14 tablet; Refill: 2        Plan:  She is going to " continue the anti-inflammatory medications and will try an injection today.  Consider MRI if issues persists.

## 2024-02-19 ENCOUNTER — TELEPHONE (OUTPATIENT)
Dept: INTERNAL MEDICINE | Facility: CLINIC | Age: 69
End: 2024-02-19
Payer: MEDICARE

## 2024-02-19 NOTE — TELEPHONE ENCOUNTER
----- Message from Jagdeep Cota sent at 2/19/2024 11:30 AM CST -----  Type:  Patient Requesting Referral    Who Called:pt    Reason for Referral:Stomach issues  Does the patient want the referral with a specific physician?:Dr Christina xie# [phone]    Best Call Back Number: [phone]  Additional Information: pt request referral to Gastro , please call pt to confirm referral was sent

## 2024-02-19 NOTE — TELEPHONE ENCOUNTER
Called pt, she states she has an appointment in April with GI and that it is too far out and wanted me to call them and make them see her sooner and asked if that was really their next available appt. I explained we can not force them to see her and that she has had several no shows with them including her last appt. Pt then asked if we can refer her to a new GI so she can be seen sooner because she can not wait that long. I explained we would have to see her to refer her as we have never referred her to GI. Pt was not happy with that and has an appt with us on 3/12. Pt advised if pain is so severe or worsens she will need to go to ER/UC. Pt verbalized understanding and will call with any questions or concerns.

## 2024-03-12 ENCOUNTER — LAB VISIT (OUTPATIENT)
Dept: LAB | Facility: HOSPITAL | Age: 69
End: 2024-03-12
Attending: NURSE PRACTITIONER
Payer: MEDICARE

## 2024-03-12 ENCOUNTER — OFFICE VISIT (OUTPATIENT)
Dept: INTERNAL MEDICINE | Facility: CLINIC | Age: 69
End: 2024-03-12
Payer: MEDICARE

## 2024-03-12 VITALS
HEART RATE: 77 BPM | HEIGHT: 59 IN | TEMPERATURE: 98 F | SYSTOLIC BLOOD PRESSURE: 133 MMHG | RESPIRATION RATE: 18 BRPM | BODY MASS INDEX: 24.8 KG/M2 | WEIGHT: 123 LBS | DIASTOLIC BLOOD PRESSURE: 74 MMHG

## 2024-03-12 DIAGNOSIS — Z78.0 POSTMENOPAUSAL: ICD-10-CM

## 2024-03-12 DIAGNOSIS — B19.20 COMPENSATED HCV CIRRHOSIS: ICD-10-CM

## 2024-03-12 DIAGNOSIS — F41.9 ANXIETY AND DEPRESSION: ICD-10-CM

## 2024-03-12 DIAGNOSIS — E55.9 VITAMIN D DEFICIENCY: ICD-10-CM

## 2024-03-12 DIAGNOSIS — G89.29 CHRONIC RIGHT SHOULDER PAIN: ICD-10-CM

## 2024-03-12 DIAGNOSIS — Z12.31 BREAST CANCER SCREENING BY MAMMOGRAM: ICD-10-CM

## 2024-03-12 DIAGNOSIS — Z11.3 SCREEN FOR STD (SEXUALLY TRANSMITTED DISEASE): ICD-10-CM

## 2024-03-12 DIAGNOSIS — F17.210 CIGARETTE NICOTINE DEPENDENCE WITHOUT COMPLICATION: ICD-10-CM

## 2024-03-12 DIAGNOSIS — F32.A ANXIETY AND DEPRESSION: ICD-10-CM

## 2024-03-12 DIAGNOSIS — I10 HYPERTENSION, UNSPECIFIED TYPE: ICD-10-CM

## 2024-03-12 DIAGNOSIS — K74.69 COMPENSATED HCV CIRRHOSIS: ICD-10-CM

## 2024-03-12 DIAGNOSIS — M25.511 CHRONIC RIGHT SHOULDER PAIN: ICD-10-CM

## 2024-03-12 LAB
ALBUMIN SERPL-MCNC: 3.2 G/DL (ref 3.4–4.8)
ALBUMIN/GLOB SERPL: 0.7 RATIO (ref 1.1–2)
ALP SERPL-CCNC: 145 UNIT/L (ref 40–150)
ALT SERPL-CCNC: 23 UNIT/L (ref 0–55)
APPEARANCE UR: CLEAR
AST SERPL-CCNC: 40 UNIT/L (ref 5–34)
BACTERIA #/AREA URNS AUTO: ABNORMAL /HPF
BASOPHILS # BLD AUTO: 0.04 X10(3)/MCL
BASOPHILS NFR BLD AUTO: 1.3 %
BILIRUB SERPL-MCNC: 1.2 MG/DL
BILIRUB UR QL STRIP.AUTO: NEGATIVE
BUN SERPL-MCNC: 10.1 MG/DL (ref 9.8–20.1)
CALCIUM SERPL-MCNC: 8.9 MG/DL (ref 8.4–10.2)
CHLORIDE SERPL-SCNC: 104 MMOL/L (ref 98–107)
CHOLEST SERPL-MCNC: 150 MG/DL
CHOLEST/HDLC SERPL: 3 {RATIO} (ref 0–5)
CO2 SERPL-SCNC: 27 MMOL/L (ref 23–31)
COLOR UR AUTO: YELLOW
CREAT SERPL-MCNC: 0.7 MG/DL (ref 0.55–1.02)
DEPRECATED CALCIDIOL+CALCIFEROL SERPL-MC: 32 NG/ML (ref 30–80)
EOSINOPHIL # BLD AUTO: 0.17 X10(3)/MCL (ref 0–0.9)
EOSINOPHIL NFR BLD AUTO: 5.6 %
ERYTHROCYTE [DISTWIDTH] IN BLOOD BY AUTOMATED COUNT: 15.6 % (ref 11.5–17)
GFR SERPLBLD CREATININE-BSD FMLA CKD-EPI: >60 MLS/MIN/1.73/M2
GLOBULIN SER-MCNC: 4.5 GM/DL (ref 2.4–3.5)
GLUCOSE SERPL-MCNC: 84 MG/DL (ref 82–115)
GLUCOSE UR QL STRIP.AUTO: NORMAL
HCT VFR BLD AUTO: 38.4 % (ref 37–47)
HDLC SERPL-MCNC: 56 MG/DL (ref 35–60)
HGB BLD-MCNC: 12.7 G/DL (ref 12–16)
HYALINE CASTS #/AREA URNS LPF: ABNORMAL /LPF
IMM GRANULOCYTES # BLD AUTO: 0.01 X10(3)/MCL (ref 0–0.04)
IMM GRANULOCYTES NFR BLD AUTO: 0.3 %
KETONES UR QL STRIP.AUTO: NEGATIVE
LDLC SERPL CALC-MCNC: 80 MG/DL (ref 50–140)
LEUKOCYTE ESTERASE UR QL STRIP.AUTO: NEGATIVE
LYMPHOCYTES # BLD AUTO: 1.45 X10(3)/MCL (ref 0.6–4.6)
LYMPHOCYTES NFR BLD AUTO: 48 %
MCH RBC QN AUTO: 29.6 PG (ref 27–31)
MCHC RBC AUTO-ENTMCNC: 33.1 G/DL (ref 33–36)
MCV RBC AUTO: 89.5 FL (ref 80–94)
MONOCYTES # BLD AUTO: 0.36 X10(3)/MCL (ref 0.1–1.3)
MONOCYTES NFR BLD AUTO: 11.9 %
MUCOUS THREADS URNS QL MICRO: ABNORMAL /LPF
NEUTROPHILS # BLD AUTO: 0.99 X10(3)/MCL (ref 2.1–9.2)
NEUTROPHILS NFR BLD AUTO: 32.9 %
NITRITE UR QL STRIP.AUTO: NEGATIVE
NRBC BLD AUTO-RTO: 0 %
PH UR STRIP.AUTO: 7 [PH]
PLATELET # BLD AUTO: 121 X10(3)/MCL (ref 130–400)
PLATELETS.RETICULATED NFR BLD AUTO: 3.1 % (ref 0.9–11.2)
PMV BLD AUTO: 10.2 FL (ref 7.4–10.4)
POTASSIUM SERPL-SCNC: 3.9 MMOL/L (ref 3.5–5.1)
PROT SERPL-MCNC: 7.7 GM/DL (ref 5.8–7.6)
PROT UR QL STRIP.AUTO: ABNORMAL
RBC # BLD AUTO: 4.29 X10(6)/MCL (ref 4.2–5.4)
RBC #/AREA URNS AUTO: ABNORMAL /HPF
RBC UR QL AUTO: NEGATIVE
SODIUM SERPL-SCNC: 138 MMOL/L (ref 136–145)
SP GR UR STRIP.AUTO: 1.02 (ref 1–1.03)
SQUAMOUS #/AREA URNS LPF: ABNORMAL /HPF
TRIGL SERPL-MCNC: 68 MG/DL (ref 37–140)
UROBILINOGEN UR STRIP-ACNC: ABNORMAL
VLDLC SERPL CALC-MCNC: 14 MG/DL
WBC # SPEC AUTO: 3.02 X10(3)/MCL (ref 4.5–11.5)
WBC #/AREA URNS AUTO: ABNORMAL /HPF

## 2024-03-12 PROCEDURE — 3075F SYST BP GE 130 - 139MM HG: CPT | Mod: CPTII,,, | Performed by: NURSE PRACTITIONER

## 2024-03-12 PROCEDURE — 99214 OFFICE O/P EST MOD 30 MIN: CPT | Mod: PBBFAC | Performed by: NURSE PRACTITIONER

## 2024-03-12 PROCEDURE — 3008F BODY MASS INDEX DOCD: CPT | Mod: CPTII,,, | Performed by: NURSE PRACTITIONER

## 2024-03-12 PROCEDURE — 1101F PT FALLS ASSESS-DOCD LE1/YR: CPT | Mod: CPTII,,, | Performed by: NURSE PRACTITIONER

## 2024-03-12 PROCEDURE — 80053 COMPREHEN METABOLIC PANEL: CPT

## 2024-03-12 PROCEDURE — 99406 BEHAV CHNG SMOKING 3-10 MIN: CPT | Mod: S$PBB,,, | Performed by: NURSE PRACTITIONER

## 2024-03-12 PROCEDURE — 36415 COLL VENOUS BLD VENIPUNCTURE: CPT

## 2024-03-12 PROCEDURE — 3078F DIAST BP <80 MM HG: CPT | Mod: CPTII,,, | Performed by: NURSE PRACTITIONER

## 2024-03-12 PROCEDURE — 82306 VITAMIN D 25 HYDROXY: CPT

## 2024-03-12 PROCEDURE — 99214 OFFICE O/P EST MOD 30 MIN: CPT | Mod: S$PBB,25,, | Performed by: NURSE PRACTITIONER

## 2024-03-12 PROCEDURE — 1160F RVW MEDS BY RX/DR IN RCRD: CPT | Mod: CPTII,,, | Performed by: NURSE PRACTITIONER

## 2024-03-12 PROCEDURE — 4010F ACE/ARB THERAPY RXD/TAKEN: CPT | Mod: CPTII,,, | Performed by: NURSE PRACTITIONER

## 2024-03-12 PROCEDURE — 1159F MED LIST DOCD IN RCRD: CPT | Mod: CPTII,,, | Performed by: NURSE PRACTITIONER

## 2024-03-12 PROCEDURE — 3288F FALL RISK ASSESSMENT DOCD: CPT | Mod: CPTII,,, | Performed by: NURSE PRACTITIONER

## 2024-03-12 PROCEDURE — 80061 LIPID PANEL: CPT

## 2024-03-12 PROCEDURE — 85025 COMPLETE CBC W/AUTO DIFF WBC: CPT

## 2024-03-12 PROCEDURE — 1126F AMNT PAIN NOTED NONE PRSNT: CPT | Mod: CPTII,,, | Performed by: NURSE PRACTITIONER

## 2024-03-12 RX ORDER — FLUOXETINE HYDROCHLORIDE 40 MG/1
40 CAPSULE ORAL DAILY
Qty: 90 CAPSULE | Refills: 1 | OUTPATIENT
Start: 2024-03-12 | End: 2024-06-05

## 2024-03-12 RX ORDER — LOSARTAN POTASSIUM 25 MG/1
25 TABLET ORAL DAILY
Qty: 90 TABLET | Refills: 1 | Status: SHIPPED | OUTPATIENT
Start: 2024-03-12 | End: 2025-03-12

## 2024-03-12 RX ORDER — CHOLECALCIFEROL (VITAMIN D3) 50 MCG
2000 TABLET ORAL DAILY
COMMUNITY

## 2024-03-12 RX ORDER — HYDROXYZINE PAMOATE 25 MG/1
CAPSULE ORAL
Qty: 45 CAPSULE | Refills: 2 | Status: SHIPPED | OUTPATIENT
Start: 2024-03-12

## 2024-03-12 RX ORDER — POTASSIUM CHLORIDE 750 MG/1
10 TABLET, EXTENDED RELEASE ORAL DAILY
Qty: 90 TABLET | Refills: 1 | OUTPATIENT
Start: 2024-03-12 | End: 2024-06-05

## 2024-03-12 NOTE — PROGRESS NOTES
Internal Medicine Clinic  KATHARINE Cooney     Patient Name: Paula Vilchis   : 1955  MRN:[id number]     Chief Complaint     Chief Complaint   Patient presents with    Follow-up     Lab review        History of Present Illness     67 year old AAF, presents in clinic for lab review. C/o chronic RT shoulder pain, Davion Aguilera Jr., MD Orthopedic Surgery following for joint injections.    PMH HTN, HEP C Treated , Liver Cirrhosis, Neutropenia, thrombocytopenia (likely from hep Cirrhosis), mixed hearing loss, positive RF & CCP, tobacco user (Smokes 1-2 cigars per day), alcohol use, daily marijuana use, stuttering.   Previously with ID clinic, finished 12 week course of Epclusa on 2020, undetectable viral load. Following [facility] GI clinic for MELD-Na score 12 Child-Layton Class B (score 7). Pt is completely asymptomatic at this time. ABD US 2022 Coarse heterogeneous and nodular appearing liver consistent the patient's history of cirrhosis. Status post cholecystectomy        Prior ECHO (abd US 2020; displayed Prominent inferior vena cava measuring up to 2.9 cm in AP diameter at the level of the liver.  Repeat ECHO performed on 2021, and displayed EF 55%, grade 1 diastolic dysfunction, Aortic valve sclerosis without stenosis, mild to moderate concentric left ventricular hypertrophy, mild mitral and tricuspid regurgitation. Referred to [facility] Cardio clinic 10/26/2021, apt pending. Following Dr. Mon Rheum for +RF, +CCP and multiple joint pain. Denies CP, SOB, HA, easy bruising, bleeding, epistaxis, gum bleeding, GI/ bleeding, bone pain, night sweats, Vomiting, diarrhea, constipation, tammy colored stools, fatigue, weakness, abd pain, fever, icterus.     EGD normal 2022 Dr. Cavazos. Repeat EGD .  Following [facility] GI clinic, Dr. Segovia,HEP C treatment; 12 weeks of Epclusa. She completed tx 2020.   DEXA 11/10/2021 normal.  MMG 10/14/2023: BIRADS 1  Hurley-guard neg 3/15/23              "            Review of Systems     Review of Systems   Constitutional: Negative.    HENT: Negative.     Eyes: Negative.    Respiratory: Negative.     Cardiovascular: Negative.    Gastrointestinal: Negative.    Endocrine: Negative.    Genitourinary: Negative.    Musculoskeletal:  Positive for arthralgias.        Right shoulder   Integumentary:  Negative.   Allergic/Immunologic: Negative.    Neurological: Negative.    Hematological: Negative.    Psychiatric/Behavioral: Negative.     All other systems reviewed and are negative.       Physical Examination     Visit Vitals  /74 (BP Location: Left arm, Patient Position: Sitting, BP Method: Small (Automatic))   Pulse 77   Temp 98 °F (36.7 °C) (Oral)   Resp 18   Ht 4' 11" (1.499 m)   Wt 55.8 kg (123 lb)   BMI 24.84 kg/m²        BP Readings from Last 6 Encounters:   03/12/24 133/74   02/07/24 (!) 155/90   10/18/23 130/74   10/11/23 (!) 153/83   08/21/23 (!) 152/84   08/06/23 123/77   ]    Wt Readings from Last 6 Encounters:   03/12/24 55.8 kg (123 lb)   02/07/24 56.7 kg (125 lb)   10/18/23 57.2 kg (126 lb)   10/11/23 58.4 kg (128 lb 12.8 oz)   08/21/23 55.8 kg (123 lb)   08/06/23 55.3 kg (122 lb)   ]      Physical Exam  Vitals and nursing note reviewed.   Constitutional:       Appearance: Normal appearance.   HENT:      Head: Normocephalic and atraumatic.      Right Ear: Tympanic membrane, ear canal and external ear normal.      Left Ear: Tympanic membrane, ear canal and external ear normal.      Nose: Nose normal.      Mouth/Throat:      Mouth: Mucous membranes are moist.      Pharynx: Oropharynx is clear.   Eyes:      Extraocular Movements: Extraocular movements intact.      Conjunctiva/sclera: Conjunctivae normal.      Pupils: Pupils are equal, round, and reactive to light.   Cardiovascular:      Rate and Rhythm: Normal rate and regular rhythm.      Pulses: Normal pulses.      Heart sounds: Normal heart sounds.   Pulmonary:      Effort: Pulmonary effort is normal.    "   Breath sounds: Normal breath sounds.   Abdominal:      General: Abdomen is flat. Bowel sounds are normal.      Palpations: Abdomen is soft.   Musculoskeletal:         General: Normal range of motion.      Cervical back: Normal range of motion and neck supple.   Skin:     General: Skin is warm and dry.      Capillary Refill: Capillary refill takes less than 2 seconds.   Neurological:      General: No focal deficit present.      Mental Status: She is alert and oriented to person, place, and time. Mental status is at baseline.   Psychiatric:         Mood and Affect: Mood normal.         Behavior: Behavior normal.         Thought Content: Thought content normal.         Judgment: Judgment normal.          Labs / Imaging     Chemistry:  Lab Results   Component Value Date     03/12/2024    K 3.9 03/12/2024    CHLORIDE 104 03/12/2024    BUN 10.1 03/12/2024    CREATININE 0.70 03/12/2024    EGFRNORACEVR >60 03/12/2024    GLUCOSE 84 03/12/2024    CALCIUM 8.9 03/12/2024    ALKPHOS 145 03/12/2024    LABPROT 7.7 (H) 03/12/2024    ALBUMIN 3.2 (L) 03/12/2024    BILIDIR 0.6 01/28/2022    IBILI 0.70 01/28/2022    AST 40 (H) 03/12/2024    ALT 23 03/12/2024    GJGXQTEJ92ON 32.0 03/12/2024        Lab Results   Component Value Date    HGBA1C 4.2 07/17/2018        Hematology:  Lab Results   Component Value Date    WBC 3.02 (L) 03/12/2024    RBC 4.29 03/12/2024    HGB 12.7 03/12/2024    HCT 38.4 03/12/2024    MCV 89.5 03/12/2024    MCH 29.6 03/12/2024    MCHC 33.1 03/12/2024    RDW 15.6 03/12/2024     (L) 03/12/2024    MPV 10.2 03/12/2024        Lipid Panel:  Lab Results   Component Value Date    CHOL 150 03/12/2024    HDL 56 03/12/2024    LDL 80.00 03/12/2024    TRIG 68 03/12/2024    TOTALCHOLEST 3 03/12/2024        Urine:  Lab Results   Component Value Date    COLORUA Yellow 03/12/2024    APPEARANCEUA Clear 03/12/2024    SGUA 1.025 03/12/2024    PHUA 7.0 03/12/2024    PROTEINUA Trace (A) 03/12/2024    GLUCOSEUA Normal  03/12/2024    KETONESUA Negative 03/12/2024    BLOODUA Negative 03/12/2024    NITRITESUA Negative 03/12/2024    LEUKOCYTESUR Negative 03/12/2024    RBCUA 0-5 03/12/2024    WBCUA 0-5 03/12/2024    BACTERIA None Seen 03/12/2024    SQEPUA None Seen 03/12/2024    HYALINECASTS None Seen 03/12/2024    CREATRANDUR 102.5 02/01/2023    PROTEINURINE 12.5 02/01/2023    UPROTCREA 0.1 02/01/2023          Assessment       ICD-10-CM ICD-9-CM   1. Hypertension, unspecified type  I10 401.9   2. Chronic right shoulder pain  M25.511 719.41    G89.29 338.29   3. Vitamin D deficiency  E55.9 268.9   4. Compensated HCV cirrhosis  K74.69 571.5    B19.20 070.54   5. Cigarette nicotine dependence without complication  F17.210 305.1   6. BMI 24.0-24.9, adult  Z68.24 V85.1   7. Anxiety and depression  F41.9 300.00    F32.A 311   8. Breast cancer screening by mammogram  Z12.31 V76.12   9. Screen for STD (sexually transmitted disease)  Z11.3 V74.5   10. Postmenopausal  Z78.0 V49.81        Plan     1. Hypertension, unspecified type  BP and HR stable. Med refills. DASH diet: Eat more fruits, vegetables, and low fat dairy foods.  (Less than 2 grams of sodium per day).  Maintain healthy weight with goal BMI <30.   Exercise 30 minutes per day 5 days per week.  Home medications refilled and continued.   Home BP monitoring encouraged with BP parameters given.    - CBC Auto Differential; Future  - Comprehensive Metabolic Panel; Future  - Lipid Panel; Future  - Urinalysis; Future    2. Chronic right shoulder pain  F/u Dr. Willy WATERS  Take otc pain medications as prescribed.  Stay active. Having strong muscles takes the strain off of your joints, which can help reduce your pain.  Rest for several minutes when your pain is at its worst.  Goal BMI <30.   Alternate hot and cold packs as needed for pain relief.     3. Vitamin D deficiency  Vitamin D level reviewed and is currently at goal, between 30-80 ng/mL. Continue OTC Vitamin D3 2000 IU daily.   -  Vitamin D; Future    4. Compensated HCV cirrhosis  F/u Cirrhosis clinic Dr. Segovia  Complete US  - Hepatitis C Virus Quantitative; Future    5. Cigarette nicotine dependence without complication  Smoking cessation advised. Instructed on smoking cessation program through [facility] and pharmacological interventions to aid in cessation.  >5 minutes allotted to educate patient on the harms of smoking, the urgency to quit, and the development of a plan for smoking cessation.     6. BMI 24.0-24.9, adult  Goal BMI <30.  Exercise 5 times a week for 30 minutes per day.  Avoid soda, simple sugars, excessive rice, potatoes or bread. Limit fast foods and fried foods.  Choose complex carbs in moderation (example: green vegetables, beans, oatmeal). Eat plenty of fresh fruits and vegetables with lean meats daily.  Do not skip meals. Eat a balanced portion size.  Avoid fad diets. Consider permanent healthy life style changes.       7. Anxiety and depression  Meds continued. Scheduled with  as discussed, she was already referred.  Practice deep breathing or abdominal breathing exercises when anxiety occurs.  Exercise daily. Get sunlight daily.  Avoid caffeine, alcohol and stimulants.  Practice positive phrases and repeat throughout the day, yoga, lavender scents or Chamomile tea will help anxiety.  Set healthy boundaries, avoid people and conversations that increase stress.  Reports any symptoms of suicidal or homicidal ideations immediately, if clinic is closed go to nearest emergency room.   - hydrOXYzine pamoate (VISTARIL) 25 MG Cap; TAKE ONE CAPSULE BY MOUTH 4 TIMES A DAY AS NEEDED FOR ANXIETY  Dispense: 45 capsule; Refill: 2    8. Breast cancer screening by mammogram    - Mammo Digital Screening Bilat w/ Russell; Future    9. Screen for STD (sexually transmitted disease)    - Hepatitis Panel, Acute; Future  - SYPHILIS ANTIBODY (WITH REFLEX RPR); Future    10. Postmenopausal    - DXA Bone Density Axial Skeleton 1 or more sites;  Future        Current Outpatient Medications   Medication Instructions    baclofen (LIORESAL) 10 mg, Oral, 3 times daily    cholecalciferol (vitamin D3) (VITAMIN D3) 2,000 Units, Oral, Daily    conjugated estrogens (PREMARIN) 0.5 g, Vaginal, Twice weekly, Apply dime size to gloved finger and apply to vaginal walls 2x/week.    diclofenac sodium (VOLTAREN) 1 % Gel SMARTSI Gram(s) Topical 4 Times Daily PRN    docusate sodium (STOOL SOFTENER) 100 mg, Oral, Daily    famotidine (PEPCID) 20 mg, Oral, Nightly PRN    FLUoxetine 40 mg, Oral, Daily    hydrOXYzine pamoate (VISTARIL) 25 MG Cap TAKE ONE CAPSULE BY MOUTH 4 TIMES A DAY AS NEEDED FOR ANXIETY    losartan (COZAAR) 25 mg, Oral, Daily    meloxicam (MOBIC) 7.5 mg, Oral, Daily    multivitamin (THERAGRAN) per tablet 1 tablet, Oral, Daily    pantoprazole (PROTONIX) 40 MG tablet TAKE ONE TABLET BY MOUTH ONCE DAILY FOR THE STOMACH.    potassium chloride SA (K-DUR,KLOR-CON M) 10 MEQ tablet 10 mEq, Oral, Daily    triamcinolone acetonide 0.1% (KENALOG) 0.1 % ointment Topical (Top), 2 times daily       Orders Placed This Encounter   Procedures    Mammo Digital Screening Bilat w/ Russell    DXA Bone Density Axial Skeleton 1 or more sites    CBC Auto Differential    Comprehensive Metabolic Panel    Lipid Panel    Urinalysis    Vitamin D    Hepatitis Panel, Acute    Hepatitis C Virus Quantitative    SYPHILIS ANTIBODY (WITH REFLEX RPR)         Future Appointments   Date Time Provider Department Center   2024  3:30 PM Aime Segovia MD [facility] GASTRO [place]   2024  9:30 AM Colleen Mon MD [facility] RHEU [place]   7/15/2024  8:40 AM Nancy Rebolledo FNP [facility] INTMED [place]    10/16/2024  8:50 AM Jennifer Rodas ANP [facility] GYN [place] [facility]        Follow up in about 4 months (around 2024) for lab review.    Labs thoroughly reviewed with patient. Medication refills addressed today.  RTC prn and 3-4 months, with labs 1 week prior to the apt.  COVID 19 precautions  given to patient.  Patient voices understanding of all discharge instructions.      Nancy Rebolledo, KATHARINE

## 2024-04-29 ENCOUNTER — OFFICE VISIT (OUTPATIENT)
Dept: GASTROENTEROLOGY | Facility: CLINIC | Age: 69
End: 2024-04-29
Payer: MEDICARE

## 2024-04-29 ENCOUNTER — HOSPITAL ENCOUNTER (EMERGENCY)
Facility: HOSPITAL | Age: 69
Discharge: HOME | End: 2024-04-29
Attending: EMERGENCY MEDICINE
Payer: MEDICARE

## 2024-04-29 VITALS
WEIGHT: 124.56 LBS | TEMPERATURE: 98 F | HEART RATE: 72 BPM | BODY MASS INDEX: 25.11 KG/M2 | SYSTOLIC BLOOD PRESSURE: 136 MMHG | RESPIRATION RATE: 16 BRPM | DIASTOLIC BLOOD PRESSURE: 78 MMHG | HEIGHT: 59 IN | OXYGEN SATURATION: 100 %

## 2024-04-29 VITALS
TEMPERATURE: 98 F | SYSTOLIC BLOOD PRESSURE: 135 MMHG | WEIGHT: 124.31 LBS | RESPIRATION RATE: 16 BRPM | HEIGHT: 62 IN | HEART RATE: 70 BPM | BODY MASS INDEX: 22.88 KG/M2 | DIASTOLIC BLOOD PRESSURE: 66 MMHG | OXYGEN SATURATION: 98 %

## 2024-04-29 DIAGNOSIS — B19.20 COMPENSATED HCV CIRRHOSIS: Primary | ICD-10-CM

## 2024-04-29 DIAGNOSIS — R21 RASH AND NONSPECIFIC SKIN ERUPTION: Primary | ICD-10-CM

## 2024-04-29 DIAGNOSIS — R10.13 EPIGASTRIC PAIN: ICD-10-CM

## 2024-04-29 DIAGNOSIS — K74.69 COMPENSATED HCV CIRRHOSIS: Primary | ICD-10-CM

## 2024-04-29 LAB — LIPASE SERPL-CCNC: 23 U/L

## 2024-04-29 PROCEDURE — 36415 COLL VENOUS BLD VENIPUNCTURE: CPT | Performed by: STUDENT IN AN ORGANIZED HEALTH CARE EDUCATION/TRAINING PROGRAM

## 2024-04-29 PROCEDURE — 83690 ASSAY OF LIPASE: CPT | Performed by: STUDENT IN AN ORGANIZED HEALTH CARE EDUCATION/TRAINING PROGRAM

## 2024-04-29 PROCEDURE — 99283 EMERGENCY DEPT VISIT LOW MDM: CPT | Mod: 27

## 2024-04-29 PROCEDURE — 99215 OFFICE O/P EST HI 40 MIN: CPT | Mod: PBBFAC | Performed by: STUDENT IN AN ORGANIZED HEALTH CARE EDUCATION/TRAINING PROGRAM

## 2024-04-29 RX ORDER — BETAMETHASONE VALERATE 1 MG/G
CREAM TOPICAL 2 TIMES DAILY
Qty: 30 G | Refills: 1 | Status: SHIPPED | OUTPATIENT
Start: 2024-04-29

## 2024-04-29 NOTE — PROGRESS NOTES
Subjective     Patient ID: Paula Vilchis is a 68 y.o. female.    Chief Complaint: HCV Cirrhosis (C/O Upper abd pain.  PT stated that she is having good BM's. States she feels something moving in her stomach.)    68-year-old female, history of hepatitis-C treated with Epclusa achieved SVR12 in September of 2020, presenting to clinic today for follow-up appointment.  She had originally been referred to transplant in 2019 when 1st diagnosed with hepatitis-C had F4 disease at that time as well but did not attend that appointment.  Initial ultrasound had revealed heterogeneous appearing liver that showed probable cirrhosis, her risk factors include tattoos and a blood transfusion before 1992 and a long-term partner with hepatitis-C.  Patient also smoker.  Most recent EGD was performed January 2022 that revealed no varices, normal stomach and duodenum.  Most up-to-date ultrasound the abdomen was November 8, 2021 that revealed no liver lesions present.  Is not on PPI therapy however she has been complaining of acid reflux for many years and a chronic cough.  I prescribed her PPI therapy back in November with 3 refills.  Patient requesting PPI therapy again at this time she is currently out, is complaining of similar symptoms she had a previous visit of nonspecific they abdominal pain.  She does admit recently she has been using NSAIDs, most notably Aleve has been using it more frequently sometimes 3 or 4 times per day due to hip and back pain.  Advised her to discontinue Aleve she can use Tylenol as needed but no more than 2000 mg per day.  She denies any melena, hematochezia, or hematemesis.  Denies any jaundice, lower extremity swelling.  She is overdue for updated abdominal ultrasound.  Her DEXA scan was normal.     5/22/23:  Only complaint today continues to be of epigastric abdominal pain we have extensively worked her up in the past since last appointment including Helicobacter pylori stool antigen which was  negative.  Further probing today she has been taking naproxen at least 2 times per night, on a relatively empty stomach.  Question whether or not she is been taking her Protonix and conjunction with this.  Counseled her extensively to avoid NSAIDs, and Tylenol is in fact okay as long as no more than 2000 mg per day.  She states she drinks wine occasionally but on further probing seems to be more frequently on weekends, states that her epigastric pain only flares when she is at home, but never when she actually comes to clinic.  Will check a lipase level today she has never had workup for pancreatitis.  Due for INR today to complete MELD workup.  Most up-to-date ultrasound was performed 12/08/2022 which only showed baseline cirrhotic liver disease no masses or new liver lesions found.  Up-to-date on EGD was performed January 2022 with normal esophagus, stomach, duodenum.    Today's visit:  Have not seen patient in a year, overdue on HCC screening, in the past chronic complaints have been of epigastric pain despite numerous conversations about avoiding NSAIDs on empty stomach, at that time she had been taking naproxen b.i.d., and not taking her Protonix.  She still drinking wine she states occasionally, she is emphatically denying taking any NSAIDs however recent visit in orthopedic clinic in February was prescribed Mobic b.i.d., it is not medicine bag today present and she can not tell me when she last finished.  Never ending lou.  In any regard, she is planning to [place] out of [place] in the next month.  She recently had a CMP drawn so will draw INR today to complete MELD labs.  Endorsing 2-3 bowel movements per day no melena hematochezia no jaundice no hematemesis.  Today in the room, as slightly agitated, jumping around onto the table, question whether or not maybe inebriated.  Review of Systems   Constitutional: Negative.    HENT: Negative.     Eyes: Negative.    Respiratory: Negative.     Cardiovascular:  Negative.    Gastrointestinal:  Positive for abdominal pain.   Endocrine: Negative.    Genitourinary: Negative.    Musculoskeletal: Negative.    Allergic/Immunologic: Negative.    Neurological: Negative.    Hematological: Negative.    Psychiatric/Behavioral: Negative.          Objective   Vitals:    04/29/24 1441   BP: 136/78   Pulse: 72   Resp: 16   Temp: 97.9 °F (36.6 °C)       Physical Exam  Constitutional:       Appearance: Normal appearance. She is normal weight.   HENT:      Head: Normocephalic and atraumatic.      Mouth/Throat:      Mouth: Mucous membranes are moist.      Pharynx: Oropharynx is clear.   Eyes:      Conjunctiva/sclera: Conjunctivae normal.      Pupils: Pupils are equal, round, and reactive to light.   Cardiovascular:      Rate and Rhythm: Normal rate and regular rhythm.      Pulses: Normal pulses.      Heart sounds: Normal heart sounds.   Pulmonary:      Effort: Pulmonary effort is normal.      Breath sounds: Normal breath sounds.   Abdominal:      General: Abdomen is flat. Bowel sounds are normal.      Palpations: Abdomen is soft.      Tenderness: There is abdominal tenderness in the epigastric area.   Skin:     General: Skin is warm and dry.   Neurological:      General: No focal deficit present.      Mental Status: She is alert. Mental status is at baseline.        Assessment and Plan     1. Compensated HCV cirrhosis  -     Protime-INR  -     US Abdomen Limited_Liver; Future; Expected date: 05/06/2024    2. Epigastric pain  -     Lipase  -     US Abdomen Limited_Liver; Future; Expected date: 05/06/2024        Background:  Alcohol: yes, drinks wine occasionally she states on weekends.    Tobacco: yes    Liver disease: HCV    MELD-Na: 11, need INR today to update  Child-Layton Class: 6A    Transplant: not under evaluation, low MELD    Cirrhosis is decompensated with:    Ascites: no  Spontaneous bacterial peritonitis: No  Hepatic Encephalopathy: No  Hepatocellular carcinoma: No  Hepatorenal  syndrome: No  Hyponatremia: No  Muscle wasting: No  Portal vein thrombosis: No    Screening:  Last EGD:  January 2022, no varices, normal stomach and duodenum.      Last imaging study:  Abd US 12/08/2022: Cirrhotic appearing liver no masses or lesions found.    CIRRHOSIS COUNSELING:  - strict abstinence of alcohol use  - low sodium (salt) 2000mg per day  - Nutrition: 25-30kcal (calorie per body weight in kilogram) per day  - No need to restrict protein in diet  - High protein diet: 1.2-1.5 gram/kg (protein per body weight in kg) per day to prevent muscle mass loss  - Resistance exercises for muscle strength  - Avoid raw seafoods due to risk of fatal Vibrio vulnificus infection  - Avoid Non-steroidal anti-inflammatory drugs (NSAIDs) such as ibuprofen, Motrin, naproxen, Aleve due to risk of kidney damage  - Can take acetaminophen (tylenol), no more than 2000mg per day  - Compliance with all medications  - Ultrasound or MRI of the liver every 6 months for liver cancer screening  - Upper endoscopy every 1-2 years to screen for varices      Return to clinic as needed, will at least updated HCC screening in the meantime as well as MELD labs.    She needs to stop taking NSAIDs, as well as supplemental potassium while on an ARB, as this can also cause pill induced esophagitis.    Advised to discontinue the supplemental potassium.    Lipase level to be drawn today along with INR.    Question component of chronic mesenteric ischemia given longstanding history of tobacco abuse, in pain with eating.    Helicobacter pylori stool antigen has been negative in the past.    She needs to stop smoking cigarettes.    Follow up if symptoms worsen or fail to improve.

## 2024-04-29 NOTE — ED PROVIDER NOTES
Encounter Date: 2024       History     Chief Complaint   Patient presents with    Rash     Itching, intermittently painful rash to the left shoulder and arm x 3 weeks     Patient complains of an itchy rash for approximately 3 weeks.  She did admit to having new body wash since the rashes started.  It is located on her upper back both arms and chest.    The history is provided by the patient. No  was used.     Review of patient's allergies indicates:  No Known Allergies  Past Medical History:   Diagnosis Date    Compensated HCV cirrhosis     Depression     HTN (hypertension)     Hx of colonic polyp     Situational depression     Thrombocytopenia, unspecified     Tobacco user      Past Surgical History:   Procedure Laterality Date     SECTION       SECTION       SECTION       SECTION      Drainage of Foot Skin, External Approach Right 2017    Draingage of Foot Skin, External Approach Right 2017    HERNIA REPAIR      Incision and Drainage of Abscess; complicated or multiple N/A 2017    Incision and Drainage of Hematoma, seroma or fluid collection N/A 2017     Family History   Problem Relation Name Age of Onset    Diabetes Father      Hypertension Father      Cancer Mother      Hypertension Mother      Stroke Paternal Uncle       Social History     Tobacco Use    Smoking status: Every Day     Types: Cigars     Start date: 1978    Smokeless tobacco: Never    Tobacco comments:     4 Black n Mild cigar a day   Substance Use Topics    Alcohol use: Yes     Alcohol/week: 3.0 standard drinks of alcohol     Types: 3 Glasses of wine per week     Comment: Wine Occasionally    Drug use: Yes     Frequency: 4.0 times per week     Types: Marijuana     Review of Systems   Constitutional:  Negative for fever.   HENT:  Negative for sore throat.    Respiratory:  Negative for shortness of breath.    Cardiovascular:  Negative for chest pain.    Gastrointestinal:  Negative for nausea.   Genitourinary:  Negative for dysuria.   Musculoskeletal:  Negative for back pain.   Skin:  Positive for rash.   Neurological:  Negative for weakness.   Hematological:  Does not bruise/bleed easily.       Physical Exam     Initial Vitals [04/29/24 1517]   BP Pulse Resp Temp SpO2   135/66 70 16 98.2 °F (36.8 °C) 98 %      MAP       --         Physical Exam    Nursing note and vitals reviewed.  Constitutional: She appears well-developed and well-nourished.   HENT:   Head: Normocephalic and atraumatic.   Eyes: Conjunctivae and EOM are normal. Pupils are equal, round, and reactive to light.   Neck: Neck supple.   Normal range of motion.  Cardiovascular:  Normal rate and regular rhythm.           Pulmonary/Chest: Breath sounds normal. No respiratory distress.   Abdominal: Abdomen is soft. Bowel sounds are normal. She exhibits no distension. There is no abdominal tenderness.   Musculoskeletal:         General: No edema. Normal range of motion.      Cervical back: Normal range of motion and neck supple.     Neurological: She is alert and oriented to person, place, and time. She has normal strength.   Skin: Skin is warm and dry. Rash noted. Rash is macular and papular.   Macular papular rash located on the chest upper back and bilateral arms.   Psychiatric: Thought content normal.         ED Course   Procedures  Labs Reviewed - No data to display       Imaging Results    None          Medications - No data to display  Medical Decision Making  Patient is which rashes and has since developed a itchy rash to her chest back and arms.  We will give steroid cream and have suggested to the patient Benadryl cream for itching.    Risk  OTC drugs.  Prescription drug management.      Additional MDM:   Differential Diagnosis:   Atopic dermatitis, erythematous papules, scabies, tinea corpus                                It is important that you follow up with your primary care provider or  specialist if indicated for further evaluation, workup, and treatment as necessary. The exam and treatment you received in Emergency Department was for an urgent problem and NOT INTENDED AS COMPLETE CARE. It is important that you FOLLOW UP with a doctor for ongoing care. If your symptoms become WORSE or you DO NOT IMPROVE and you are unable to reach your health care provider, you should RETURN to the Emergency Department      Clinical Impression:  Final diagnoses:  [R21] Rash and nonspecific skin eruption (Primary)          ED Disposition Condition    Discharge Stable          ED Prescriptions       Medication Sig Dispense Start Date End Date Auth. Provider    betamethasone valerate 0.1% (VALISONE) 0.1 % Crea Apply topically 2 (two) times daily. 30 g 4/29/2024 -- Jasmyne Evans FNP          Follow-up Information       Follow up With Specialties Details Why Contact Info    Nancy Rebolledo FNP Nurse Practitioner   [address] [facility] [address]  [phone]               Jasmyne Evans FNP  04/29/24 7841

## 2024-05-02 ENCOUNTER — HOSPITAL ENCOUNTER (OUTPATIENT)
Dept: RADIOLOGY | Facility: HOSPITAL | Age: 69
Discharge: HOME | End: 2024-05-02
Attending: STUDENT IN AN ORGANIZED HEALTH CARE EDUCATION/TRAINING PROGRAM
Payer: MEDICARE

## 2024-05-02 DIAGNOSIS — R10.13 EPIGASTRIC PAIN: ICD-10-CM

## 2024-05-02 DIAGNOSIS — B19.20 COMPENSATED HCV CIRRHOSIS: ICD-10-CM

## 2024-05-02 DIAGNOSIS — K74.69 COMPENSATED HCV CIRRHOSIS: ICD-10-CM

## 2024-05-02 PROCEDURE — 76705 ECHO EXAM OF ABDOMEN: CPT | Mod: TC

## 2024-05-14 ENCOUNTER — HOSPITAL ENCOUNTER (EMERGENCY)
Facility: HOSPITAL | Age: 69
Discharge: HOME | End: 2024-05-14
Attending: EMERGENCY MEDICINE
Payer: MEDICARE

## 2024-05-14 VITALS
DIASTOLIC BLOOD PRESSURE: 75 MMHG | HEIGHT: 63 IN | SYSTOLIC BLOOD PRESSURE: 125 MMHG | HEART RATE: 85 BPM | WEIGHT: 127 LBS | RESPIRATION RATE: 18 BRPM | BODY MASS INDEX: 22.5 KG/M2 | TEMPERATURE: 98 F | OXYGEN SATURATION: 99 %

## 2024-05-14 DIAGNOSIS — R52 PAIN: ICD-10-CM

## 2024-05-14 DIAGNOSIS — M10.9 GOUT, UNSPECIFIED CAUSE, UNSPECIFIED CHRONICITY, UNSPECIFIED SITE: ICD-10-CM

## 2024-05-14 DIAGNOSIS — M25.579 ANKLE PAIN, UNSPECIFIED CHRONICITY, UNSPECIFIED LATERALITY: Primary | ICD-10-CM

## 2024-05-14 LAB — URATE SERPL-MCNC: 3.8 MG/DL (ref 2.6–6)

## 2024-05-14 PROCEDURE — 96372 THER/PROPH/DIAG INJ SC/IM: CPT | Performed by: NURSE PRACTITIONER

## 2024-05-14 PROCEDURE — 99284 EMERGENCY DEPT VISIT MOD MDM: CPT | Mod: 25

## 2024-05-14 PROCEDURE — 63600175 PHARM REV CODE 636 W HCPCS: Performed by: NURSE PRACTITIONER

## 2024-05-14 PROCEDURE — 84550 ASSAY OF BLOOD/URIC ACID: CPT | Performed by: NURSE PRACTITIONER

## 2024-05-14 RX ORDER — DEXAMETHASONE SODIUM PHOSPHATE 4 MG/ML
4 INJECTION, SOLUTION INTRA-ARTICULAR; INTRALESIONAL; INTRAMUSCULAR; INTRAVENOUS; SOFT TISSUE
Status: COMPLETED | OUTPATIENT
Start: 2024-05-14 | End: 2024-05-14

## 2024-05-14 RX ORDER — INDOMETHACIN 50 MG/1
50 CAPSULE ORAL
Qty: 15 CAPSULE | Refills: 0 | Status: SHIPPED | OUTPATIENT
Start: 2024-05-14

## 2024-05-14 RX ORDER — KETOROLAC TROMETHAMINE 30 MG/ML
30 INJECTION, SOLUTION INTRAMUSCULAR; INTRAVENOUS
Status: COMPLETED | OUTPATIENT
Start: 2024-05-14 | End: 2024-05-14

## 2024-05-14 RX ORDER — PREDNISONE 10 MG/1
10 TABLET ORAL DAILY
Qty: 21 TABLET | Refills: 0 | OUTPATIENT
Start: 2024-05-14 | End: 2024-06-05

## 2024-05-14 RX ADMIN — DEXAMETHASONE SODIUM PHOSPHATE 4 MG: 4 INJECTION, SOLUTION INTRA-ARTICULAR; INTRALESIONAL; INTRAMUSCULAR; INTRAVENOUS; SOFT TISSUE at 06:05

## 2024-05-14 RX ADMIN — KETOROLAC TROMETHAMINE 30 MG: 30 INJECTION, SOLUTION INTRAMUSCULAR at 06:05

## 2024-05-14 NOTE — ED PROVIDER NOTES
Encounter Date: 2024       History     Chief Complaint   Patient presents with    Ankle Pain     Non traumatic R ankle pain after waking up this morning     Patient is a 68-year-old female who presents to the emergency department with complaints of nontraumatic right ankle pain.  States the pain started this morning.  Does have history of arthritis.  Denies history of gout.  There is minimal swelling noted to the right lateral ankle.  She states is worse with weight-bearing and certain movements of the ankle.  She denies any other complaints or associated symptoms at this time.      Review of patient's allergies indicates:  No Known Allergies  Past Medical History:   Diagnosis Date    Compensated HCV cirrhosis     Depression     HTN (hypertension)     Hx of colonic polyp     Situational depression     Thrombocytopenia, unspecified     Tobacco user      Past Surgical History:   Procedure Laterality Date     SECTION       SECTION       SECTION       SECTION      Drainage of Foot Skin, External Approach Right 2017    Draingage of Foot Skin, External Approach Right 2017    HERNIA REPAIR      Incision and Drainage of Abscess; complicated or multiple N/A 2017    Incision and Drainage of Hematoma, seroma or fluid collection N/A 2017     Family History   Problem Relation Name Age of Onset    Diabetes Father      Hypertension Father      Cancer Mother      Hypertension Mother      Stroke Paternal Uncle       Social History     Tobacco Use    Smoking status: Every Day     Types: Cigars     Start date: 1978    Smokeless tobacco: Never    Tobacco comments:     4 Black n Mild cigar a day   Substance Use Topics    Alcohol use: Yes     Alcohol/week: 3.0 standard drinks of alcohol     Types: 3 Glasses of wine per week     Comment: Wine Occasionally    Drug use: Yes     Frequency: 4.0 times per week     Types: Marijuana     Review of Systems   Constitutional:  Negative  for activity change, appetite change and fever.   HENT:  Negative for congestion, dental problem and sore throat.    Eyes:  Negative for discharge and itching.   Respiratory:  Negative for apnea, chest tightness and shortness of breath.    Cardiovascular:  Negative for chest pain.   Gastrointestinal:  Negative for abdominal distention, abdominal pain and nausea.   Genitourinary:  Negative for dysuria, vaginal bleeding, vaginal discharge and vaginal pain.   Musculoskeletal:  Positive for arthralgias, gait problem, joint swelling and myalgias. Negative for back pain.   Skin:  Negative for rash.   Neurological:  Negative for dizziness, facial asymmetry and weakness.   Hematological:  Does not bruise/bleed easily.   Psychiatric/Behavioral:  Negative for agitation and behavioral problems.    All other systems reviewed and are negative.      Physical Exam     Initial Vitals [05/14/24 1710]   BP Pulse Resp Temp SpO2   123/73 83 16 98.1 °F (36.7 °C) 98 %      MAP       --         Physical Exam    Nursing note and vitals reviewed.  Constitutional: Vital signs are normal. She appears well-developed and well-nourished.  Non-toxic appearance. She does not have a sickly appearance.   HENT:   Head: Normocephalic and atraumatic.   Eyes: Conjunctivae, EOM and lids are normal. Lids are everted and swept, no foreign bodies found.   Neck: Trachea normal and phonation normal. Neck supple. No thyroid mass and no thyromegaly present.   Normal range of motion.   Full passive range of motion without pain.     Cardiovascular:  Normal rate, regular rhythm, S1 normal, S2 normal, normal heart sounds, intact distal pulses and normal pulses.           Musculoskeletal:         General: Tenderness and edema present.      Cervical back: Full passive range of motion without pain, normal range of motion and neck supple.      Comments: Minimal erythema and swelling to the right lateral ankle.  Denies history of gout.  Tender to touch.  Tender with  flexion-extension and Veress pressure applied to the ankle.     Lymphadenopathy:     She has no cervical adenopathy.   Neurological: She is alert and oriented to person, place, and time. She has normal strength.   Skin: Skin is warm, dry and intact. Capillary refill takes less than 2 seconds.   Psychiatric: She has a normal mood and affect. Her speech is normal and behavior is normal. Judgment normal. Cognition and memory are normal.         ED Course   Procedures  Labs Reviewed   URIC ACID - Normal          Imaging Results              X-Ray Ankle Complete Right (Final result)  Result time 05/14/24 18:32:53      Final result by Chavez Alvarez MD (05/14/24 18:32:53)                   Impression:      No acute osseous abnormality.      Electronically signed by: Chavez Alvarez  Date:    05/14/2024  Time:    18:32               Narrative:    EXAMINATION:  XR ANKLE COMPLETE 3 VIEW RIGHT    CLINICAL HISTORY:  Pain, unspecified    TECHNIQUE:  AP, lateral, and oblique images of the right ankle were performed.    COMPARISON:  None    FINDINGS:  No acute displaced fracture, dislocation or osseous erosions identified.  The ankle mortise is maintained.  Mild degenerative changes of the tibiotalar joint.  Calcaneal plantar enthesopathy.  Degenerative changes of the midfoot.  Vascular calcifications are present.  Mild soft tissue swelling overlying the lateral malleolus.  No radiopaque retained foreign body.                                       Medications   ketorolac injection 30 mg (30 mg Intramuscular Given 5/14/24 1828)   dexAMETHasone injection 4 mg (4 mg Intramuscular Given 5/14/24 1828)     Medical Decision Making  Patient is a 68-year-old female who presents to the emergency department with complaints of nontraumatic right ankle pain.  States the pain started this morning.  Does have history of arthritis.  Denies history of gout.  There is minimal swelling noted to the right lateral ankle.  She states is worse with  weight-bearing and certain movements of the ankle.  She denies any other complaints or associated symptoms at this time.    Problems Addressed:  Gout, unspecified cause, unspecified chronicity, unspecified site: acute illness or injury     Details: Patient had significant relief of pain after IM shots.  Uric acid was negative but I do believe this to be gouty arthritis.  Discussed steroids to go home with and indomethacin as needed for pain.  Discussed taking medication with food.  Discussed follow up with PCP.  Strict ER return precautions discussed for any change or worsening symptoms.    Amount and/or Complexity of Data Reviewed  Labs: ordered. Decision-making details documented in ED Course.  Radiology: ordered. Decision-making details documented in ED Course.    Risk  Prescription drug management.               ED Course as of 05/14/24 1920 Tue May 14, 2024   1812 Will do uric acid x-ray of the ankle as this was some nontraumatic swelling to the right ankle.  Seems consistent with gout due the tender she has an difficulty with bearing weight at the time.  No other signs of trauma noted to the ankle.  No concerns for his infection as there is no open sores cuts to the leg.  There some old healing 0.5 cm sized wounds noted scattered throughout the leg the patient reports is mosquito bites but these appear to be old and healing. [SL]   1915 Patient had significant relief of pain after IM shots.  Uric acid was negative but I do believe this to be gouty arthritis.  Discussed steroids to go home with and indomethacin as needed for pain.  Discussed taking medication with food.  Discussed follow up with PCP.  Strict ER return precautions discussed for any change or worsening symptoms. [SL]      ED Course User Index  [SL] Moris Lynch, KATHARINE                             Clinical Impression:  Final diagnoses:  [R52] Pain  [M25.579] Ankle pain, unspecified chronicity, unspecified laterality (Primary)  [M10.9] Gout,  unspecified cause, unspecified chronicity, unspecified site          ED Disposition Condition    Discharge Stable          ED Prescriptions       Medication Sig Dispense Start Date End Date Auth. Provider    predniSONE (DELTASONE) 10 MG tablet Take 1 tablet (10 mg total) by mouth once daily. Take 4 tabs x 3 days, then take 2 tabs x 3 days, then take 1 tab x 3 days. 21 tablet 5/14/2024 -- Moris Lynch FNP    indomethacin (INDOCIN) 50 MG capsule Take 1 capsule (50 mg total) by mouth 3 (three) times daily with meals. 15 capsule 5/14/2024 -- Moris Lynch FNP          Follow-up Information       Follow up With Specialties Details Why Contact Info    Nancy Rebolledo FNP Family Medicine Schedule an appointment as soon as possible for a visit in 1 day For ER Follow Up. [address] [facility] [address]  [phone]               Moris Lynch FNP  05/14/24 1920

## 2024-05-30 ENCOUNTER — NURSE TRIAGE (OUTPATIENT)
Dept: ADMINISTRATIVE | Facility: CLINIC | Age: 69
End: 2024-05-30
Payer: MEDICARE

## 2024-05-30 ENCOUNTER — HOSPITAL ENCOUNTER (EMERGENCY)
Facility: HOSPITAL | Age: 69
Discharge: HOME | End: 2024-05-30
Attending: INTERNAL MEDICINE
Payer: MEDICARE

## 2024-05-30 VITALS
SYSTOLIC BLOOD PRESSURE: 121 MMHG | BODY MASS INDEX: 23.26 KG/M2 | HEIGHT: 59 IN | DIASTOLIC BLOOD PRESSURE: 79 MMHG | HEART RATE: 70 BPM | OXYGEN SATURATION: 99 % | RESPIRATION RATE: 16 BRPM | TEMPERATURE: 98 F | WEIGHT: 115.38 LBS

## 2024-05-30 DIAGNOSIS — R10.84 GENERALIZED ABDOMINAL PAIN: Primary | ICD-10-CM

## 2024-05-30 DIAGNOSIS — K43.9 VENTRAL HERNIA WITHOUT OBSTRUCTION OR GANGRENE: ICD-10-CM

## 2024-05-30 LAB
ALBUMIN SERPL-MCNC: 3.3 G/DL (ref 3.4–4.8)
ALBUMIN/GLOB SERPL: 0.8 RATIO (ref 1.1–2)
ALP SERPL-CCNC: 124 UNIT/L (ref 40–150)
ALT SERPL-CCNC: 30 UNIT/L (ref 0–55)
ANION GAP SERPL CALC-SCNC: 7 MEQ/L
AST SERPL-CCNC: 37 UNIT/L (ref 5–34)
BACTERIA #/AREA URNS AUTO: ABNORMAL /HPF
BASOPHILS # BLD AUTO: 0.04 X10(3)/MCL
BASOPHILS NFR BLD AUTO: 0.8 %
BILIRUB SERPL-MCNC: 1.6 MG/DL
BILIRUB UR QL STRIP.AUTO: ABNORMAL
BUN SERPL-MCNC: 11 MG/DL (ref 9.8–20.1)
CALCIUM SERPL-MCNC: 8.9 MG/DL (ref 8.4–10.2)
CHLORIDE SERPL-SCNC: 104 MMOL/L (ref 98–107)
CLARITY UR: ABNORMAL
CO2 SERPL-SCNC: 29 MMOL/L (ref 23–31)
COLOR UR AUTO: YELLOW
CREAT SERPL-MCNC: 0.69 MG/DL (ref 0.55–1.02)
CREAT/UREA NIT SERPL: 16
EOSINOPHIL # BLD AUTO: 0.13 X10(3)/MCL (ref 0–0.9)
EOSINOPHIL NFR BLD AUTO: 2.7 %
ERYTHROCYTE [DISTWIDTH] IN BLOOD BY AUTOMATED COUNT: 15.4 % (ref 11.5–17)
GFR SERPLBLD CREATININE-BSD FMLA CKD-EPI: >60 ML/MIN/1.73/M2
GLOBULIN SER-MCNC: 4.4 GM/DL (ref 2.4–3.5)
GLUCOSE SERPL-MCNC: 76 MG/DL (ref 82–115)
GLUCOSE UR QL STRIP: NEGATIVE
HCT VFR BLD AUTO: 39.2 % (ref 37–47)
HGB BLD-MCNC: 12.8 G/DL (ref 12–16)
HGB UR QL STRIP: ABNORMAL
IMM GRANULOCYTES # BLD AUTO: 0.02 X10(3)/MCL (ref 0–0.04)
IMM GRANULOCYTES NFR BLD AUTO: 0.4 %
INR PPP: 1.1
KETONES UR QL STRIP: ABNORMAL
LEUKOCYTE ESTERASE UR QL STRIP: NEGATIVE
LIPASE SERPL-CCNC: 19 U/L
LYMPHOCYTES # BLD AUTO: 1.96 X10(3)/MCL (ref 0.6–4.6)
LYMPHOCYTES NFR BLD AUTO: 41 %
MAGNESIUM SERPL-MCNC: 1.8 MG/DL (ref 1.6–2.6)
MCH RBC QN AUTO: 29.2 PG (ref 27–31)
MCHC RBC AUTO-ENTMCNC: 32.7 G/DL (ref 33–36)
MCV RBC AUTO: 89.5 FL (ref 80–94)
MONOCYTES # BLD AUTO: 0.35 X10(3)/MCL (ref 0.1–1.3)
MONOCYTES NFR BLD AUTO: 7.3 %
MUCOUS THREADS URNS QL MICRO: ABNORMAL /LPF
NEUTROPHILS # BLD AUTO: 2.28 X10(3)/MCL (ref 2.1–9.2)
NEUTROPHILS NFR BLD AUTO: 47.8 %
NITRITE UR QL STRIP: NEGATIVE
PH UR STRIP: 7 [PH]
PLATELET # BLD AUTO: 130 X10(3)/MCL (ref 130–400)
PMV BLD AUTO: 9.6 FL (ref 7.4–10.4)
POTASSIUM SERPL-SCNC: 4.4 MMOL/L (ref 3.5–5.1)
PROT SERPL-MCNC: 7.7 GM/DL (ref 5.8–7.6)
PROT UR QL STRIP: NEGATIVE
PROTHROMBIN TIME: 14.5 SECONDS (ref 12.5–14.5)
RBC # BLD AUTO: 4.38 X10(6)/MCL (ref 4.2–5.4)
RBC #/AREA URNS AUTO: ABNORMAL /HPF
SODIUM SERPL-SCNC: 140 MMOL/L (ref 136–145)
SP GR UR STRIP.AUTO: 1.01 (ref 1–1.03)
SQUAMOUS #/AREA URNS AUTO: ABNORMAL /HPF
UROBILINOGEN UR STRIP-ACNC: >=8
WBC # SPEC AUTO: 4.78 X10(3)/MCL (ref 4.5–11.5)
WBC #/AREA URNS AUTO: ABNORMAL /HPF

## 2024-05-30 PROCEDURE — 85610 PROTHROMBIN TIME: CPT | Performed by: INTERNAL MEDICINE

## 2024-05-30 PROCEDURE — 85025 COMPLETE CBC W/AUTO DIFF WBC: CPT | Performed by: INTERNAL MEDICINE

## 2024-05-30 PROCEDURE — 80053 COMPREHEN METABOLIC PANEL: CPT | Performed by: INTERNAL MEDICINE

## 2024-05-30 PROCEDURE — 83735 ASSAY OF MAGNESIUM: CPT | Performed by: INTERNAL MEDICINE

## 2024-05-30 PROCEDURE — 81003 URINALYSIS AUTO W/O SCOPE: CPT | Performed by: INTERNAL MEDICINE

## 2024-05-30 PROCEDURE — 99284 EMERGENCY DEPT VISIT MOD MDM: CPT | Mod: 25

## 2024-05-30 PROCEDURE — 83690 ASSAY OF LIPASE: CPT | Performed by: INTERNAL MEDICINE

## 2024-05-30 RX ORDER — ONDANSETRON 4 MG/1
4 TABLET, ORALLY DISINTEGRATING ORAL EVERY 6 HOURS PRN
Qty: 15 TABLET | Refills: 0 | Status: SHIPPED | OUTPATIENT
Start: 2024-05-30 | End: 2024-06-05

## 2024-05-30 RX ORDER — HYDROCODONE BITARTRATE AND ACETAMINOPHEN 5; 325 MG/1; MG/1
1 TABLET ORAL EVERY 6 HOURS PRN
Qty: 12 TABLET | Refills: 0 | Status: SHIPPED | OUTPATIENT
Start: 2024-05-30

## 2024-05-30 RX ORDER — LACTULOSE 10 G/15ML
20 SOLUTION ORAL 3 TIMES DAILY PRN
Qty: 473 ML | Refills: 1 | Status: SHIPPED | OUTPATIENT
Start: 2024-05-30

## 2024-05-30 NOTE — TELEPHONE ENCOUNTER
Reason for Disposition   [1] SEVERE pain AND [2] age > 60 years    Additional Information   Negative: Shock suspected (e.g., cold/pale/clammy skin, too weak to stand, low BP, rapid pulse)   Negative: Difficult to awaken or acting confused (e.g., disoriented, slurred speech)   Negative: Passed out (i.e., lost consciousness, collapsed and was not responding)   Negative: Sounds like a life-threatening emergency to the triager   Negative: [1] SEVERE pain (e.g., excruciating) AND [2] present > 1 hour    Protocols used: Abdominal Pain - Female-A-AH

## 2024-05-30 NOTE — ED PROVIDER NOTES
Source of History:  Patient, no limitations    Chief complaint:  Abdominal Pain (Dc lower abd pain x 1 day with n/v. States had US done weeks ago with no results received.)      HPI:  Paula Vilchis is a 68 y.o. female presenting with Abdominal Pain (Dc lower abd pain x 1 day with n/v. States had US done weeks ago with no results received.)         Patient presents for evaluation of abdominal pain. Onset of symptoms was gradual starting a few days ago with stable course since that time. The pain is located diffusely. The pain is rated as moderate. The pain is made worse by food and pressure and is relieved by nothing. The patient denies anorexia, constipation, diarrhea, and vomiting. The past workup has included GI consult, ultrasound. The pertinent past history includes hep C.         Review of Systems   Constitutional symptoms:  Negative except as documented in HPI.   Skin symptoms:  Negative except as documented in HPI.   HEENT symptoms:  Negative except as documented in HPI.   Respiratory symptoms:  Negative except as documented in HPI.   Cardiovascular symptoms:  Negative except as documented in HPI.   Gastrointestinal symptoms:  Negative except as documented in HPI.    Genitourinary symptoms:  Negative except as documented in HPI.   Musculoskeletal symptoms:  Negative except as documented in HPI.   Neurologic symptoms:  Negative except as documented in HPI.   Psychiatric symptoms:  Negative except as documented in HPI.   Allergy/immunologic symptoms:  Negative except as documented in HPI.             Additional review of systems information: All other systems reviewed and otherwise negative.      Review of patient's allergies indicates:  No Known Allergies    PMH:  As per HPI and below:    Past Medical History:   Diagnosis Date    Compensated HCV cirrhosis     Depression     HTN (hypertension)     Hx of colonic polyp     Situational depression     Thrombocytopenia, unspecified     Tobacco user   "      Family History   Problem Relation Name Age of Onset    Diabetes Father      Hypertension Father      Cancer Mother      Hypertension Mother      Stroke Paternal Uncle         Past Surgical History:   Procedure Laterality Date     SECTION       SECTION       SECTION       SECTION      Drainage of Foot Skin, External Approach Right 2017    Draingage of Foot Skin, External Approach Right 2017    HERNIA REPAIR      Incision and Drainage of Abscess; complicated or multiple N/A 2017    Incision and Drainage of Hematoma, seroma or fluid collection N/A 2017       Social History     Tobacco Use    Smoking status: Every Day     Types: Cigars     Start date: 1978    Smokeless tobacco: Never    Tobacco comments:     4 Black n Mild cigar a day   Substance Use Topics    Alcohol use: Yes     Alcohol/week: 3.0 standard drinks of alcohol     Types: 3 Glasses of wine per week     Comment: Wine Occasionally    Drug use: Yes     Frequency: 4.0 times per week     Types: Marijuana       Patient Active Problem List   Diagnosis    Hypertension    Depression    Compensated HCV cirrhosis    Tobacco user    Thrombocytopenia, unspecified    Thrombocytopenia    Neutropenia    Alcohol abuse    Uses marijuana    Vitamin D deficiency    Gastroesophageal reflux disease    Sensorineural hearing loss (SNHL) of left ear    Chronic hepatitis C virus infection    Acute depression    Low platelet count    Rheumatoid arthritis involving multiple sites with positive rheumatoid factor    Positive COOKIE (antinuclear antibody)    High risk medication use        Physical Exam:    /79   Pulse 70   Temp 98 °F (36.7 °C)   Resp 16   Ht 4' 11.45" (1.51 m)   Wt 52.3 kg (115 lb 6.4 oz)   SpO2 99%   BMI 22.96 kg/m²     Nursing note and vital signs reviewed.    General:  Alert, no acute distress.   Skin: Normal for Ethnic Origin, No cyanosis  HEENT: Normocephalic and atraumatic, Vision " unchanged, Pupils symmetric, No icterus , Nasal mucosa is pink and moist  Cardiovascular:  Regular rate and rhythm, No edema  Chest Wall: No deformity, equal chest rise  Respiratory:  Lungs are clear to auscultation, respirations are non-labored.    Musculoskeletal:  No deformity, Normal perfusion to all extremities  Gastrointestinal:  Soft, Non distended, generalized tenderness without rebound, active BS   Neurological:  Alert and oriented, normal motor observed, normal speech observed.    Psychiatric:  Cooperative, appropriate mood & affect.        Labs that have been ordered have been independently reviewed and interpreted by myself.     Old Chart Reviewed.      Initial Impression/ Differential Dx:  GERD, intestinal spasm, gastroenteritis, gastritis, ulcer, cholecystitis, cholelithiasis, gallstones, pancreatitis, ileus, small bowel obstruction, appendicitis, diverticulitis, colitis, constipation, intestinal gas pain.      MDM:      Reviewed Nurses Note.    Reviewed Pertinent old records.    Orders Placed This Encounter    CT Abdomen Pelvis  Without Contrast    Urinalysis, Reflex to Urine Culture    CBC Auto Differential    Comprehensive Metabolic Panel    Protime-INR    Lipase    Magnesium    CBC with Differential    Urinalysis, Microscopic    ondansetron (ZOFRAN-ODT) 4 MG TbDL    HYDROcodone-acetaminophen (NORCO) 5-325 mg per tablet    lactulose (CHRONULAC) 20 gram/30 mL Soln                    Labs Reviewed   URINALYSIS, REFLEX TO URINE CULTURE - Abnormal; Notable for the following components:       Result Value    Appearance, UA Slightly Cloudy (*)     Ketones, UA 1+ (*)     Blood, UA Trace-Intact (*)     Bilirubin, UA 1+ (*)     Urobilinogen, UA >=8.0 (*)     All other components within normal limits   COMPREHENSIVE METABOLIC PANEL - Abnormal; Notable for the following components:    Glucose 76 (*)     Protein Total 7.7 (*)     Albumin 3.3 (*)     Globulin 4.4 (*)     Albumin/Globulin Ratio 0.8 (*)      Bilirubin Total 1.6 (*)     AST 37 (*)     All other components within normal limits   CBC WITH DIFFERENTIAL - Abnormal; Notable for the following components:    MCHC 32.7 (*)     All other components within normal limits   URINALYSIS, MICROSCOPIC - Abnormal; Notable for the following components:    Mucous, UA Small (*)     Squamous Epithelial Cells, UA Few (*)     All other components within normal limits   PROTIME-INR - Normal   LIPASE - Normal   MAGNESIUM - Normal   CBC W/ AUTO DIFFERENTIAL    Narrative:     The following orders were created for panel order CBC Auto Differential.  Procedure                               Abnormality         Status                     ---------                               -----------         ------                     CBC with Differential[[id number]]       Abnormal            Final result                 Please view results for these tests on the individual orders.          CT Abdomen Pelvis  Without Contrast   Final Result      1. Ventral hernia containing loops of apparent large and small bowel without evidence of bowel obstruction   2. Scattered mucosal prominence versus underdistention at the colon   3. Diverticulosis coli   4. Extensive atherosclerosis   5. Borderline cardiomegaly   6. Mild patchy hazy opacities at the left lung base and to a lesser extent the right lung base compatible with minimal infiltrate, atelectasis, and/or scarring   7. Status post cholecystectomy   8. A few small lobulated radiopaque densities at the anterior left abdomen of uncertain etiology.  This may represent foreign bodies versus is postsurgical changes.   9. Thoracolumbar spondylosis and osteopenia         Electronically signed by: Raphael Cassidy   Date:    05/30/2024   Time:    16:47           Admission on 05/30/2024, Discharged on 05/30/2024   Component Date Value Ref Range Status    Color, UA 05/30/2024 Yellow  Yellow, Light-Yellow, Dark Yellow, [place], Straw Final    Appearance, UA  05/30/2024 Slightly Cloudy (A)  Clear Final    Specific Gravity, UA 05/30/2024 1.010  1.005 - 1.030 Final    pH, UA 05/30/2024 7.0  5.0 - 8.5 Final    Protein, UA 05/30/2024 Negative  Negative Final    Glucose, UA 05/30/2024 Negative  Negative, Normal Final    Ketones, UA 05/30/2024 1+ (A)  Negative Final    Blood, UA 05/30/2024 Trace-Intact (A)  Negative Final    Bilirubin, UA 05/30/2024 1+ (A)  Negative Final    Urobilinogen, UA 05/30/2024 >=8.0 (A)  0.2, 1.0, Normal Final    Nitrites, UA 05/30/2024 Negative  Negative Final    Leukocyte Esterase, UA 05/30/2024 Negative  Negative Final    Sodium 05/30/2024 140  136 - 145 mmol/L Final    Potassium 05/30/2024 4.4  3.5 - 5.1 mmol/L Final    Chloride 05/30/2024 104  98 - 107 mmol/L Final    CO2 05/30/2024 29  23 - 31 mmol/L Final    Glucose 05/30/2024 76 (L)  82 - 115 mg/dL Final    Blood Urea Nitrogen 05/30/2024 11.0  9.8 - 20.1 mg/dL Final    Creatinine 05/30/2024 0.69  0.55 - 1.02 mg/dL Final    Calcium 05/30/2024 8.9  8.4 - 10.2 mg/dL Final    Protein Total 05/30/2024 7.7 (H)  5.8 - 7.6 gm/dL Final    Albumin 05/30/2024 3.3 (L)  3.4 - 4.8 g/dL Final    Globulin 05/30/2024 4.4 (H)  2.4 - 3.5 gm/dL Final    Albumin/Globulin Ratio 05/30/2024 0.8 (L)  1.1 - 2.0 ratio Final    Bilirubin Total 05/30/2024 1.6 (H)  <=1.5 mg/dL Final    ALP 05/30/2024 124  40 - 150 unit/L Final    ALT 05/30/2024 30  0 - 55 unit/L Final    AST 05/30/2024 37 (H)  5 - 34 unit/L Final    eGFR 05/30/2024 >60  mL/min/1.73/m2 Final    Anion Gap 05/30/2024 7.0  mEq/L Final    BUN/Creatinine Ratio 05/30/2024 16   Final    PT 05/30/2024 14.5  12.5 - 14.5 seconds Final    INR 05/30/2024 1.1  <=1.3 Final    Lipase Level 05/30/2024 19  <=60 U/L Final    Magnesium Level 05/30/2024 1.80  1.60 - 2.60 mg/dL Final    WBC 05/30/2024 4.78  4.50 - 11.50 x10(3)/mcL Final    RBC 05/30/2024 4.38  4.20 - 5.40 x10(6)/mcL Final    Hgb 05/30/2024 12.8  12.0 - 16.0 g/dL Final    Hct 05/30/2024 39.2  37.0 - 47.0 % Final     MCV 05/30/2024 89.5  80.0 - 94.0 fL Final    MCH 05/30/2024 29.2  27.0 - 31.0 pg Final    MCHC 05/30/2024 32.7 (L)  33.0 - 36.0 g/dL Final    RDW 05/30/2024 15.4  11.5 - 17.0 % Final    Platelet 05/30/2024 130  130 - 400 x10(3)/mcL Final    MPV 05/30/2024 9.6  7.4 - 10.4 fL Final    Neut % 05/30/2024 47.8  % Final    Lymph % 05/30/2024 41.0  % Final    Mono % 05/30/2024 7.3  % Final    Eos % 05/30/2024 2.7  % Final    Basophil % 05/30/2024 0.8  % Final    Lymph # 05/30/2024 1.96  0.6 - 4.6 x10(3)/mcL Final    Neut # 05/30/2024 2.28  2.1 - 9.2 x10(3)/mcL Final    Mono # 05/30/2024 0.35  0.1 - 1.3 x10(3)/mcL Final    Eos # 05/30/2024 0.13  0 - 0.9 x10(3)/mcL Final    Baso # 05/30/2024 0.04  <=0.2 x10(3)/mcL Final    IG# 05/30/2024 0.02  0 - 0.04 x10(3)/mcL Final    IG% 05/30/2024 0.4  % Final    Bacteria, UA 05/30/2024 None Seen  None Seen, Rare, Occasional /HPF Final    Mucous, UA 05/30/2024 Small (A)  None Seen /LPF Final    RBC, UA 05/30/2024 None Seen  None Seen, 0-2, 3-5, 0-5 /HPF Final    WBC, UA 05/30/2024 None Seen  None Seen, 0-2, 3-5, 0-5 /HPF Final    Squamous Epithelial Cells, UA 05/30/2024 Few (A)  None Seen, Rare, Occasional, Occ /HPF Final       Imaging Results              CT Abdomen Pelvis  Without Contrast (Final result)  Result time 05/30/24 16:47:02      Final result by Raphael Cassidy MD (05/30/24 16:47:02)                   Impression:      1. Ventral hernia containing loops of apparent large and small bowel without evidence of bowel obstruction  2. Scattered mucosal prominence versus underdistention at the colon  3. Diverticulosis coli  4. Extensive atherosclerosis  5. Borderline cardiomegaly  6. Mild patchy hazy opacities at the left lung base and to a lesser extent the right lung base compatible with minimal infiltrate, atelectasis, and/or scarring  7. Status post cholecystectomy  8. A few small lobulated radiopaque densities at the anterior left abdomen of uncertain etiology.  This may  represent foreign bodies versus is postsurgical changes.  9. Thoracolumbar spondylosis and osteopenia      Electronically signed by: Raphael Cassidy  Date:    05/30/2024  Time:    16:47               Narrative:    EXAMINATION:  CT ABDOMEN PELVIS WITHOUT CONTRAST    CLINICAL HISTORY:  Abdominal pain, acute, nonlocalized;, .    TECHNIQUE:  PATIENT RADIATION DOSE: DLP(mGycm) 204    As per PQRS measures, all CT scans at this facility used dose modulation, iterative reconstruction, and/or weight based dose adjustment when appropriate to reduce radiation dose to as low as reasonably achievable.    COMPARISON:  None available.    FINDINGS:  Serial axial images were obtained of the abdomen pelvis without the administration of IV contrast.  Additional sagittal and coronal reconstructions were performed. Degenerative changes and curvature are noted to the thoracolumbar spine.  Bony structures are osteopenic.  The heart is borderline enlarged.  Mild patchy hazy opacities evident at the lung bases.  The liver is mildly enlarged.  The left lobe of the liver extends to the left side of the abdomen.  The gallbladder is surgically absent.  Atherosclerosis is seen within the aorta and branching vessels.  The spleen is borderline enlarged.  The adrenal glands and pancreas are grossly within normal limits without the administration of IV contrast.  Extensive atherosclerosis is seen within the aorta and branching vessels.  There is mucosal prominence versus underdistention at the stomach.  The kidneys are relatively symmetric in size.  No hydronephrosis is seen.  A few small radiopaque densities are noted to the anterior left the abdomen which may represent foreign bodies versus postsurgical changes.  No dilated loops of bowel are seen.  Gas and feces are present within the colon.  A ventral hernia is identified containing loops of bowel without evidence of bowel obstruction.  There is mucosal thickening versus underdistention scattered  throughout the colon.  The uterus is normal in size.  The bladder is distended with fluid.  There is trace free fluid posterior right pelvis.  Scattered diverticula are noted to the descending and sigmoid colon.                                                     ED Course as of 05/30/24 1740   Thu May 30, 2024   1531 Blood, UA(!): Trace-Intact [MP]      ED Course User Index  [MP] Brendan Hughes DO                        Diagnostic Impression:    1. Generalized abdominal pain    2. Ventral hernia without obstruction or gangrene         ED Disposition Condition    Discharge Stable             Follow-up Information       [facility] - Emergency Dept.    Specialty: Emergency Medicine  Why: If symptoms worsen  Contact information:  [address]  [facility] [id number] [phone]             Call  Nancy Rebolledo FNP.    Specialty: Family Medicine  Contact information:  [address] [address]  [phone]                              ED Prescriptions       Medication Sig Dispense Start Date End Date Auth. Provider    ondansetron (ZOFRAN-ODT) 4 MG TbDL Take 1 tablet (4 mg total) by mouth every 6 (six) hours as needed (Nausea). 15 tablet 5/30/2024 -- Brendan Hughes DO    HYDROcodone-acetaminophen (NORCO) 5-325 mg per tablet Take 1 tablet by mouth every 6 (six) hours as needed for Pain. 12 tablet 5/30/2024 -- Brendan Hughes DO    lactulose (CHRONULAC) 20 gram/30 mL Soln Take 30 mLs (20 g total) by mouth 3 (three) times daily as needed (constipation). 473 mL 5/30/2024 -- Brendan Hughes DO          Follow-up Information       Follow up With Specialties Details Why Contact Info    [facility] - Emergency Dept Emergency Medicine  If symptoms worsen [address]  [facility] [id number] [phone]    Nancy Rebolledo FNP Family Medicine Call   [address] [facility] [id number] [phone]               Saul  Brendan CURIEL,   05/30/24 1749

## 2024-05-30 NOTE — TELEPHONE ENCOUNTER
Pt calling with c/o abdominal pain and triaged and care advice to go to the ED now and pt said that she didn't have anyone to bring her and told her that she can call 911 and pt said ok. Pt told to call us when she got home if any worsening or fever and she said ok

## 2024-06-03 ENCOUNTER — TELEPHONE (OUTPATIENT)
Dept: INTERNAL MEDICINE | Facility: CLINIC | Age: 69
End: 2024-06-03

## 2024-06-03 ENCOUNTER — TELEPHONE (OUTPATIENT)
Dept: INTERNAL MEDICINE | Facility: CLINIC | Age: 69
End: 2024-06-03
Payer: MEDICARE

## 2024-06-03 NOTE — TELEPHONE ENCOUNTER
"Pt returned call to clinic and I explained to her I was calling to f/u on her call.Pt stated she had a CT done and not a US. I then was proceeding to do f/u questions with pt when she stated," the note is there I should be able to see it duh."Advised pt she was not going to speak to me  that way, that was very rude and I am just that I was just trying to help. Pt stated she's upset and needing answers. I then informed pt I understand and again reassured her I was just trying to help. Pt stated " Have Nancy call me back. I told the pt okay and I would relay the message. Please advise.  "

## 2024-06-03 NOTE — TELEPHONE ENCOUNTER
Called and LVM to return call to [facility] for further discussion of issue below.

## 2024-06-03 NOTE — TELEPHONE ENCOUNTER
----- Message from Naa Matute sent at 6/3/2024  2:13 PM CDT -----  Who Called: Paula Raiza Harmon    Caller is requesting assistance/information from provider's office  Preferred Method of Contact: Phone Call  Patient's Preferred Phone Number on File: [phone]   Best Call Back Number, if different:  Additional Information:  medical advice needed , pt called and stated she recently went to ER for stomach pain and an US showed she has a hernia and would like a call to discuss asap, please advise, thanks

## 2024-06-05 ENCOUNTER — HOSPITAL ENCOUNTER (EMERGENCY)
Facility: HOSPITAL | Age: 69
Discharge: HOME | End: 2024-06-05
Attending: INTERNAL MEDICINE
Payer: MEDICARE

## 2024-06-05 VITALS
HEIGHT: 59 IN | BODY MASS INDEX: 23.24 KG/M2 | SYSTOLIC BLOOD PRESSURE: 122 MMHG | RESPIRATION RATE: 18 BRPM | WEIGHT: 115.31 LBS | HEART RATE: 88 BPM | OXYGEN SATURATION: 100 % | DIASTOLIC BLOOD PRESSURE: 80 MMHG | TEMPERATURE: 98 F

## 2024-06-05 DIAGNOSIS — D61.818 ACQUIRED PANCYTOPENIA: ICD-10-CM

## 2024-06-05 DIAGNOSIS — K43.9 VENTRAL HERNIA WITHOUT OBSTRUCTION OR GANGRENE: Primary | ICD-10-CM

## 2024-06-05 LAB
ALBUMIN SERPL-MCNC: 3 G/DL (ref 3.4–4.8)
ALBUMIN/GLOB SERPL: 0.8 RATIO (ref 1.1–2)
ALP SERPL-CCNC: 119 UNIT/L (ref 40–150)
ALT SERPL-CCNC: 22 UNIT/L (ref 0–55)
ANION GAP SERPL CALC-SCNC: 4 MEQ/L
AST SERPL-CCNC: 33 UNIT/L (ref 5–34)
BACTERIA #/AREA URNS AUTO: ABNORMAL /HPF
BASOPHILS # BLD AUTO: 0.03 X10(3)/MCL
BASOPHILS NFR BLD AUTO: 0.9 %
BILIRUB SERPL-MCNC: 0.7 MG/DL
BILIRUB UR QL STRIP.AUTO: NEGATIVE
BUN SERPL-MCNC: 13.5 MG/DL (ref 9.8–20.1)
CALCIUM SERPL-MCNC: 9.5 MG/DL (ref 8.4–10.2)
CHLORIDE SERPL-SCNC: 108 MMOL/L (ref 98–107)
CLARITY UR: ABNORMAL
CO2 SERPL-SCNC: 27 MMOL/L (ref 23–31)
COLOR UR AUTO: YELLOW
CREAT SERPL-MCNC: 0.66 MG/DL (ref 0.55–1.02)
CREAT/UREA NIT SERPL: 20
EOSINOPHIL # BLD AUTO: 0.1 X10(3)/MCL (ref 0–0.9)
EOSINOPHIL NFR BLD AUTO: 3.1 %
ERYTHROCYTE [DISTWIDTH] IN BLOOD BY AUTOMATED COUNT: 15.3 % (ref 11.5–17)
GFR SERPLBLD CREATININE-BSD FMLA CKD-EPI: >60 ML/MIN/1.73/M2
GLOBULIN SER-MCNC: 3.9 GM/DL (ref 2.4–3.5)
GLUCOSE SERPL-MCNC: 110 MG/DL (ref 82–115)
GLUCOSE UR QL STRIP: NORMAL
HCT VFR BLD AUTO: 35.6 % (ref 37–47)
HGB BLD-MCNC: 11.6 G/DL (ref 12–16)
HGB UR QL STRIP: NEGATIVE
HOLD SPECIMEN: NORMAL
HYALINE CASTS #/AREA URNS LPF: ABNORMAL /LPF
IMM GRANULOCYTES # BLD AUTO: 0.01 X10(3)/MCL (ref 0–0.04)
IMM GRANULOCYTES NFR BLD AUTO: 0.3 %
KETONES UR QL STRIP: NEGATIVE
LEUKOCYTE ESTERASE UR QL STRIP: NEGATIVE
LYMPHOCYTES # BLD AUTO: 0.97 X10(3)/MCL (ref 0.6–4.6)
LYMPHOCYTES NFR BLD AUTO: 29.9 %
MCH RBC QN AUTO: 29.6 PG (ref 27–31)
MCHC RBC AUTO-ENTMCNC: 32.6 G/DL (ref 33–36)
MCV RBC AUTO: 90.8 FL (ref 80–94)
MONOCYTES # BLD AUTO: 0.44 X10(3)/MCL (ref 0.1–1.3)
MONOCYTES NFR BLD AUTO: 13.6 %
NEUTROPHILS # BLD AUTO: 1.69 X10(3)/MCL (ref 2.1–9.2)
NEUTROPHILS NFR BLD AUTO: 52.2 %
NITRITE UR QL STRIP: NEGATIVE
NRBC BLD AUTO-RTO: 0 %
PH UR STRIP: 8 [PH]
PLATELET # BLD AUTO: 83 X10(3)/MCL (ref 130–400)
PLATELETS.RETICULATED NFR BLD AUTO: 4 % (ref 0.9–11.2)
PMV BLD AUTO: 10.8 FL (ref 7.4–10.4)
POTASSIUM SERPL-SCNC: 4.4 MMOL/L (ref 3.5–5.1)
PROT SERPL-MCNC: 6.9 GM/DL (ref 5.8–7.6)
PROT UR QL STRIP: NEGATIVE
RBC # BLD AUTO: 3.92 X10(6)/MCL (ref 4.2–5.4)
RBC #/AREA URNS AUTO: ABNORMAL /HPF
SODIUM SERPL-SCNC: 139 MMOL/L (ref 136–145)
SP GR UR STRIP.AUTO: 1.01 (ref 1–1.03)
SQUAMOUS #/AREA URNS LPF: ABNORMAL /HPF
UROBILINOGEN UR STRIP-ACNC: NORMAL
WBC # SPEC AUTO: 3.24 X10(3)/MCL (ref 4.5–11.5)
WBC #/AREA URNS AUTO: ABNORMAL /HPF

## 2024-06-05 PROCEDURE — 85025 COMPLETE CBC W/AUTO DIFF WBC: CPT | Performed by: INTERNAL MEDICINE

## 2024-06-05 PROCEDURE — 80053 COMPREHEN METABOLIC PANEL: CPT | Performed by: INTERNAL MEDICINE

## 2024-06-05 PROCEDURE — 81001 URINALYSIS AUTO W/SCOPE: CPT | Performed by: INTERNAL MEDICINE

## 2024-06-05 PROCEDURE — 63600175 PHARM REV CODE 636 W HCPCS: Performed by: INTERNAL MEDICINE

## 2024-06-05 PROCEDURE — 96372 THER/PROPH/DIAG INJ SC/IM: CPT | Performed by: INTERNAL MEDICINE

## 2024-06-05 PROCEDURE — 99284 EMERGENCY DEPT VISIT MOD MDM: CPT | Mod: 25

## 2024-06-05 RX ORDER — DICYCLOMINE HYDROCHLORIDE 10 MG/ML
10 INJECTION INTRAMUSCULAR
Status: COMPLETED | OUTPATIENT
Start: 2024-06-05 | End: 2024-06-05

## 2024-06-05 RX ORDER — ONDANSETRON 4 MG/1
4 TABLET, ORALLY DISINTEGRATING ORAL EVERY 6 HOURS PRN
Qty: 15 TABLET | Refills: 0 | Status: SHIPPED | OUTPATIENT
Start: 2024-06-05

## 2024-06-05 RX ORDER — DICYCLOMINE HYDROCHLORIDE 20 MG/1
20 TABLET ORAL 3 TIMES DAILY PRN
Qty: 20 TABLET | Refills: 0 | Status: SHIPPED | OUTPATIENT
Start: 2024-06-05

## 2024-06-05 RX ADMIN — DICYCLOMINE HYDROCHLORIDE 10 MG: 20 INJECTION, SOLUTION INTRAMUSCULAR at 12:06

## 2024-06-05 NOTE — ED PROVIDER NOTES
Encounter Date: 2024       History     Chief Complaint   Patient presents with    Abdominal Pain     C/o abdominal pain , losing weight. States was sent here for hernia.      Presents requesting surgery referral due to ventral hernia causing her nausea and poain. Hx of hernia repair, around 30 years ago in [place].    The history is provided by the patient, a relative and medical records.     Review of patient's allergies indicates:  No Known Allergies  Past Medical History:   Diagnosis Date    Compensated HCV cirrhosis     Depression     HTN (hypertension)     Hx of colonic polyp     Situational depression     Thrombocytopenia, unspecified     Tobacco user      Past Surgical History:   Procedure Laterality Date     SECTION       SECTION       SECTION       SECTION      Drainage of Foot Skin, External Approach Right 2017    Draingage of Foot Skin, External Approach Right 2017    HERNIA REPAIR      Incision and Drainage of Abscess; complicated or multiple N/A 2017    Incision and Drainage of Hematoma, seroma or fluid collection N/A 2017     Family History   Problem Relation Name Age of Onset    Diabetes Father      Hypertension Father      Cancer Mother      Hypertension Mother      Stroke Paternal Uncle       Social History     Tobacco Use    Smoking status: Every Day     Types: Cigars     Start date: 1978    Smokeless tobacco: Never    Tobacco comments:     4 Black n Mild cigar a day   Substance Use Topics    Alcohol use: Yes     Alcohol/week: 3.0 standard drinks of alcohol     Types: 3 Glasses of wine per week     Comment: Wine Occasionally    Drug use: Yes     Frequency: 4.0 times per week     Types: Marijuana     Review of Systems   Gastrointestinal:  Positive for abdominal pain and nausea.       Physical Exam     Initial Vitals [24 1142]   BP Pulse Resp Temp SpO2   124/86 85 20 98.8 °F (37.1 °C) 100 %      MAP       --         Physical  Exam    Nursing note and vitals reviewed.  Constitutional: She appears well-developed. No distress.   HENT:   Head: Normocephalic and atraumatic.   Mouth/Throat: Oropharynx is clear and moist.   Eyes: Conjunctivae are normal. Pupils are equal, round, and reactive to light.   Neck: Neck supple.   Normal range of motion.  Cardiovascular:  Normal rate, regular rhythm, normal heart sounds and intact distal pulses.           Pulmonary/Chest: Breath sounds normal.   Abdominal: Abdomen is soft. Bowel sounds are normal. She exhibits no distension. There is no abdominal tenderness.   Reducible ventral hernia There is no rebound and no guarding.   Musculoskeletal:         General: No edema. Normal range of motion.      Cervical back: Normal range of motion and neck supple.     Neurological: She is alert and oriented to person, place, and time. She has normal strength. GCS score is 15. GCS eye subscore is 4. GCS verbal subscore is 5. GCS motor subscore is 6.   Skin: Skin is warm and dry. No rash noted.   Psychiatric: Her behavior is normal.         ED Course   Procedures  Labs Reviewed   COMPREHENSIVE METABOLIC PANEL - Abnormal; Notable for the following components:       Result Value    Chloride 108 (*)     Albumin 3.0 (*)     Globulin 3.9 (*)     Albumin/Globulin Ratio 0.8 (*)     All other components within normal limits   CBC WITH DIFFERENTIAL - Abnormal; Notable for the following components:    WBC 3.24 (*)     RBC 3.92 (*)     Hgb 11.6 (*)     Hct 35.6 (*)     MCHC 32.6 (*)     Platelet 83 (*)     MPV 10.8 (*)     Neut # 1.69 (*)     All other components within normal limits   CBC W/ AUTO DIFFERENTIAL    Narrative:     The following orders were created for panel order CBC auto differential.  Procedure                               Abnormality         Status                     ---------                               -----------         ------                     CBC with Differential[[id number]]       Abnormal             Final result                 Please view results for these tests on the individual orders.   URINALYSIS, REFLEX TO URINE CULTURE   EXTRA TUBES    Narrative:     The following orders were created for panel order EXTRA TUBES.  Procedure                               Abnormality         Status                     ---------                               -----------         ------                     Light Blue Top Hold[[id number]]                             In process                 Red Top Hold[[id number]]                                    In process                 Light Green Top Hold[[id number]]                            In process                 Lavender Top Hold[[id number]]                               In process                 Gold Top Hold[[id number]]                                   In process                 Pink Top Hold[[id number]]                                   In process                   Please view results for these tests on the individual orders.   LIGHT BLUE TOP HOLD   RED TOP HOLD   LIGHT GREEN TOP HOLD   LAVENDER TOP HOLD   GOLD TOP HOLD   PINK TOP HOLD          Imaging Results    None          Medications   dicyclomine injection 10 mg (10 mg Intramuscular Given 6/5/24 1229)     Medical Decision Making  Amount and/or Complexity of Data Reviewed  External Data Reviewed: radiology.     Details: Impression:     1. Ventral hernia containing loops of apparent large and small bowel without evidence of bowel obstruction  2. Scattered mucosal prominence versus underdistention at the colon  3. Diverticulosis coli  4. Extensive atherosclerosis  5. Borderline cardiomegaly  6. Mild patchy hazy opacities at the left lung base and to a lesser extent the right lung base compatible with minimal infiltrate, atelectasis, and/or scarring  7. Status post cholecystectomy  8. A few small lobulated radiopaque densities at the anterior left abdomen of uncertain etiology.  This may represent foreign bodies versus  is postsurgical changes.  9. Thoracolumbar spondylosis and osteopenia        Electronically signed by:Raphael Cassidy  Date:                                            05/30/2024  Time:                                           16:47    Labs: ordered. Decision-making details documented in ED Course.    Risk  Prescription drug management.      Additional MDM:   Differential Diagnosis:   Appendicitis, Diverticulitis, Pancreatitis, Pyelonephritis, AAA, Dissection, MI, Gastric Ulcer, Peptic Ulcer, Urinary retention, among others                                        Clinical Impression:  Final diagnoses:  [K43.9] Ventral hernia without obstruction or gangrene (Primary)  [D61.818] Acquired pancytopenia          ED Disposition Condition    Discharge Stable          ED Prescriptions       Medication Sig Dispense Start Date End Date Auth. Provider    ondansetron (ZOFRAN-ODT) 4 MG TbDL Take 1 tablet (4 mg total) by mouth every 6 (six) hours as needed (Nausea). 15 tablet 6/5/2024 -- Lui Anthony MD    dicyclomine (BENTYL) 20 mg tablet Take 1 tablet (20 mg total) by mouth 3 (three) times daily as needed (Abdominal pain). 20 tablet 6/5/2024 -- Lui Anthony MD          Follow-up Information       Follow up With Specialties Details Why Contact Info    Nancy Rebolledo, FNP Family Medicine Schedule an appointment as soon as possible for a visit in 2 weeks  [address] [facility] [id number] [phone]      [facility] Emergency Medicine  If symptoms worsen [address] [facility] [id number] [phone]    [facility] General Surgery Schedule an appointment as soon as possible for a visit in 1 month  [address] [facility] [id number] [phone]             Lui Anthony MD  06/05/24 1422

## 2024-06-11 ENCOUNTER — OFFICE VISIT (OUTPATIENT)
Dept: SURGERY | Facility: CLINIC | Age: 69
End: 2024-06-11
Payer: MEDICARE

## 2024-06-11 VITALS
RESPIRATION RATE: 18 BRPM | HEIGHT: 59 IN | OXYGEN SATURATION: 100 % | WEIGHT: 121 LBS | HEART RATE: 68 BPM | DIASTOLIC BLOOD PRESSURE: 74 MMHG | SYSTOLIC BLOOD PRESSURE: 113 MMHG | BODY MASS INDEX: 24.39 KG/M2

## 2024-06-11 DIAGNOSIS — K43.9 VENTRAL HERNIA WITHOUT OBSTRUCTION OR GANGRENE: ICD-10-CM

## 2024-06-11 PROCEDURE — 99215 OFFICE O/P EST HI 40 MIN: CPT | Mod: PBBFAC

## 2024-06-11 RX ORDER — FLUOXETINE HYDROCHLORIDE 40 MG/1
40 CAPSULE ORAL DAILY
COMMUNITY

## 2024-06-11 NOTE — PROGRESS NOTES
[facility] Note     CC: Ventral Hernia     HPI:   Paula Vilhcis is a 68 y.o. female with PMHx of HTN, cirrhosis seconary to HCV, and tobacco use who presents today for evaluation of a ventral hernia. She reports abdominal pain associated with a midline bulge for 6-7 months. Patient recently seen in the ED on 24 for abdominal pain and nausea secondary to ventral hernia. Patient reports normal bowel habits, denies constipation, diarrhea, N/V, fever/chills, SOB and CP. Patient denies anticoagulants, PMHx of MI, and CVA. PSHx includes ventral hernia repair 12 years ago in [place],  section x4, and cholecystectomy. Patient reports smoking 4x tobacco cigars per/day, wine on the weekends, and marijuana use daily.     PMH:        Past Medical History:   Diagnosis Date    Compensated HCV cirrhosis      Depression      HTN (hypertension)      Hx of colonic polyp      Situational depression      Thrombocytopenia, unspecified      Tobacco user        Meds:   Current Medications   Current Outpatient Medications:     betamethasone valerate 0.1% (VALISONE) 0.1 % Crea, Apply topically 2 (two) times daily., Disp: 30 g, Rfl: 1    cholecalciferol, vitamin D3, (VITAMIN D3) 50 mcg (2,000 unit) Tab, Take 2,000 Units by mouth once daily., Disp: , Rfl:     dicyclomine (BENTYL) 20 mg tablet, Take 1 tablet (20 mg total) by mouth 3 (three) times daily as needed (Abdominal pain)., Disp: 20 tablet, Rfl: 0    docusate sodium (STOOL SOFTENER) 100 MG capsule, Take 100 mg by mouth once daily., Disp: , Rfl:     FLUoxetine 40 MG capsule, Take 40 mg by mouth once daily., Disp: , Rfl:     hydrOXYzine pamoate (VISTARIL) 25 MG Cap, TAKE ONE CAPSULE BY MOUTH 4 TIMES A DAY AS NEEDED FOR ANXIETY, Disp: 45 capsule, Rfl: 2    losartan (COZAAR) 25 MG tablet, Take 1 tablet (25 mg total) by mouth once daily., Disp: 90 tablet, Rfl: 1    multivitamin (THERAGRAN) per tablet, Take 1 tablet by mouth once daily., Disp: , Rfl:      ondansetron (ZOFRAN-ODT) 4 MG TbDL, Take 1 tablet (4 mg total) by mouth every 6 (six) hours as needed (Nausea)., Disp: 15 tablet, Rfl: 0    pantoprazole (PROTONIX) 40 MG tablet, TAKE ONE TABLET BY MOUTH ONCE DAILY FOR THE STOMACH., Disp: 90 tablet, Rfl: 3    baclofen (LIORESAL) 10 MG tablet, Take 1 tablet (10 mg total) by mouth 3 (three) times daily. for 14 days, Disp: 42 tablet, Rfl: 0    HYDROcodone-acetaminophen (NORCO) 5-325 mg per tablet, Take 1 tablet by mouth every 6 (six) hours as needed for Pain. (Patient not taking: Reported on 2024), Disp: 12 tablet, Rfl: 0    indomethacin (INDOCIN) 50 MG capsule, Take 1 capsule (50 mg total) by mouth 3 (three) times daily with meals. (Patient not taking: Reported on 2024), Disp: 15 capsule, Rfl: 0    lactulose (CHRONULAC) 20 gram/30 mL Soln, Take 30 mLs (20 g total) by mouth 3 (three) times daily as needed (constipation). (Patient not taking: Reported on 2024), Disp: 473 mL, Rfl: 1     Allergies: Review of patient's allergies indicates:  No Known Allergies  Social History:   Social History   Social History            Tobacco Use    Smoking status: Every Day       Types: Cigars       Start date: 1978    Smokeless tobacco: Never    Tobacco comments:       4 Black n Mild cigar a day   Substance Use Topics    Alcohol use: Yes       Alcohol/week: 3.0 standard drinks of alcohol       Types: 3 Glasses of wine per week       Comment: Wine Occasionally    Drug use: Yes       Frequency: 4.0 times per week       Types: Marijuana         Family History:          Family History   Problem Relation Name Age of Onset    Diabetes Father        Hypertension Father        Cancer Mother        Hypertension Mother        Stroke Paternal Uncle          Surgical History:         Past Surgical History:   Procedure Laterality Date     SECTION         SECTION         SECTION         SECTION        Drainage of Foot Skin, External Approach Right  2017    Draingage of Foot Skin, External Approach Right 2017    HERNIA REPAIR        Incision and Drainage of Abscess; complicated or multiple N/A 2017    Incision and Drainage of Hematoma, seroma or fluid collection N/A 2017      Review of Systems:  General: Denies fever/chills.  Skin: No rashes or itching.  Head: Denies headache or recent trauma.  Eyes: Denies eye pain or double vision.  Neck: Denies swelling or hoarseness of voice.  Respiratory: Denies shortness of breath or chest pain  Cardiac: Denies palpitations or swelling in hands/feet.  Gastrointestinal: Denies vomiting. Denies changes in bowel habits, denies constipation and diarrhea. Sharp abdominal pain and nausea secondary to ventral hernia. One episode of hematochezia a few days ago  Urinary: Denies dysuria or hematuria.  Vascular: Denies claudication or leg swelling.  Neuro: Denies motor deficits. Denies weakness.  Endocrine: Denies excessive sweating or cold intolerance.  Psych: Denies memory problems. Denies anxiety.     Objective:     Vitals:      Vitals:     24 0944   BP: 113/74   Pulse: 68   Resp: 18         Physical Exam:  Gen: NAD  Neuro: awake, alert, answering questions appropriately  CV: RRR  Resp: non-labored breathing, DEAN  Abd: soft, ND, reducible infraumbilical hernia, mildly tender, vertical lower midline scar present from  sections  Ext: moves all 4 spontaneously and purposefully  Skin: warm, well perfused     Imaging:  CT abdomen/pelvis on 24  1. Ventral hernia containing loops of apparent large and small bowel without evidence of bowel obstruction  2. Scattered mucosal prominence versus underdistention at the colon        Assessment/Plan:  Paula Vilchis is a 68 y.o. female with PMHx of HTN, cirrhosis seconary to HCV, and tobacco use who presents today for evaluation of a ventral hernia.     - CT with multiple hernia defects, largest measures 5 cm  - Refer to [facility] for minimally  invasive surgical repair as we cannot offer that at this time  - RTC PRN     Jac Will, MS3    Patient seen and examined with Student Doctor Suman. Agree with assessment and plan as documented. Above note reviewed and edited as necessary.    Jeny Cheney MD  [facility], PGY-3

## 2024-06-11 NOTE — PROGRESS NOTES
[facility] Note    CC: L. Ventral Hernia    HPI:   Paula Vilchis is a 68 y.o. female with PMHx of HT, cirrhosis seconary to HCV, and tobacco use referred here to discuss possible surgical repair of incarcerated ventral hernia containing both large and small bowel f4 cm in diameter found on  CT abd/pelvis. Patient recently seen in the ED on 24 for abdominal pain and nausea secondary to ventral hernia. Patient reports normal bowel habits and one episode of hematochezia, denies constipation, diarrhea, N/V, fever/chills, SOB and CP. Patient denies anticoagulants, PMHx of MI, and CVA. PSHx includes ventral hernia repair, 4x  sections, and cholecystectomy. Patient reports smoking 4x tobacco cigars per/day, wine on the weekends, and marijuana use daily.    PMH:   Past Medical History:   Diagnosis Date    Compensated HCV cirrhosis     Depression     HTN (hypertension)     Hx of colonic polyp     Situational depression     Thrombocytopenia, unspecified     Tobacco user       Meds:   Current Outpatient Medications:     betamethasone valerate 0.1% (VALISONE) 0.1 % Crea, Apply topically 2 (two) times daily., Disp: 30 g, Rfl: 1    cholecalciferol, vitamin D3, (VITAMIN D3) 50 mcg (2,000 unit) Tab, Take 2,000 Units by mouth once daily., Disp: , Rfl:     dicyclomine (BENTYL) 20 mg tablet, Take 1 tablet (20 mg total) by mouth 3 (three) times daily as needed (Abdominal pain)., Disp: 20 tablet, Rfl: 0    docusate sodium (STOOL SOFTENER) 100 MG capsule, Take 100 mg by mouth once daily., Disp: , Rfl:     FLUoxetine 40 MG capsule, Take 40 mg by mouth once daily., Disp: , Rfl:     hydrOXYzine pamoate (VISTARIL) 25 MG Cap, TAKE ONE CAPSULE BY MOUTH 4 TIMES A DAY AS NEEDED FOR ANXIETY, Disp: 45 capsule, Rfl: 2    losartan (COZAAR) 25 MG tablet, Take 1 tablet (25 mg total) by mouth once daily., Disp: 90 tablet, Rfl: 1    multivitamin (THERAGRAN) per tablet, Take 1 tablet by mouth once daily., Disp: , Rfl:      ondansetron (ZOFRAN-ODT) 4 MG TbDL, Take 1 tablet (4 mg total) by mouth every 6 (six) hours as needed (Nausea)., Disp: 15 tablet, Rfl: 0    pantoprazole (PROTONIX) 40 MG tablet, TAKE ONE TABLET BY MOUTH ONCE DAILY FOR THE STOMACH., Disp: 90 tablet, Rfl: 3    baclofen (LIORESAL) 10 MG tablet, Take 1 tablet (10 mg total) by mouth 3 (three) times daily. for 14 days, Disp: 42 tablet, Rfl: 0    HYDROcodone-acetaminophen (NORCO) 5-325 mg per tablet, Take 1 tablet by mouth every 6 (six) hours as needed for Pain. (Patient not taking: Reported on 2024), Disp: 12 tablet, Rfl: 0    indomethacin (INDOCIN) 50 MG capsule, Take 1 capsule (50 mg total) by mouth 3 (three) times daily with meals. (Patient not taking: Reported on 2024), Disp: 15 capsule, Rfl: 0    lactulose (CHRONULAC) 20 gram/30 mL Soln, Take 30 mLs (20 g total) by mouth 3 (three) times daily as needed (constipation). (Patient not taking: Reported on 2024), Disp: 473 mL, Rfl: 1  Allergies: Review of patient's allergies indicates:  No Known Allergies  Social History:   Social History     Tobacco Use    Smoking status: Every Day     Types: Cigars     Start date: 1978    Smokeless tobacco: Never    Tobacco comments:     4 Black n Mild cigar a day   Substance Use Topics    Alcohol use: Yes     Alcohol/week: 3.0 standard drinks of alcohol     Types: 3 Glasses of wine per week     Comment: Wine Occasionally    Drug use: Yes     Frequency: 4.0 times per week     Types: Marijuana     Family History:   Family History   Problem Relation Name Age of Onset    Diabetes Father      Hypertension Father      Cancer Mother      Hypertension Mother      Stroke Paternal Uncle       Surgical History:   Past Surgical History:   Procedure Laterality Date     SECTION       SECTION       SECTION       SECTION      Drainage of Foot Skin, External Approach Right 2017    Draingage of Foot Skin, External Approach Right 2017     HERNIA REPAIR      Incision and Drainage of Abscess; complicated or multiple N/A 2017    Incision and Drainage of Hematoma, seroma or fluid collection N/A 2017     Review of Systems:  General: Denies fever/chills.  Skin: No rashes or itching.  Head: Denies headache or recent trauma.  Eyes: Denies eye pain or double vision.  Neck: Denies swelling or hoarseness of voice.  Respiratory: Denies shortness of breath or chest pain  Cardiac: Denies palpitations or swelling in hands/feet.  Gastrointestinal: Denies vomiting. Denies changes in bowel habits, denies constipation and diarrhea. Sharp abdominal pain and nausea secondary to ventral hernia. One episode of hematochezia a few days ago  Urinary: Denies dysuria or hematuria.  Vascular: Denies claudication or leg swelling.  Neuro: Denies motor deficits. Denies weakness.  Endocrine: Denies excessive sweating or cold intolerance.  Psych: Denies memory problems. Denies anxiety.    Objective:    Vitals:  Vitals:    24 0944   BP: 113/74   Pulse: 68   Resp: 18        Physical Exam:  Gen: NAD  Neuro: awake, alert, answering questions appropriately  CV: RRR  Resp: non-labored breathing, DEAN  Abd: soft, ND, TTP L. Suprapubic area, partial reducible ventral hernia palpated, vertical scar present from  sections  : Deferred  Ext: moves all 4 spontaneously and purposefully  Skin: warm, well perfused    Imaging:  CT abdomen/pelvis on 24  1. Ventral hernia containing loops of apparent large and small bowel without evidence of bowel obstruction  2. Scattered mucosal prominence versus underdistention at the colon      Assessment/Plan:  Paula Vilchis is a 68 y.o. female with PMHx of HT, cirrhosis seconary to HCV, and tobacco use referred here to discuss possible surgical repair of incarcerated ventral hernia containing both large and small bowel f4 cm in diameter found on  CT abd/pelvis.    - Ventral hernia 4 cm in diameter  - Refer to [facility] for  minimally invasive surgical repair    Jac Will, MS3  06/11/2024 10:10 AM

## 2024-06-19 ENCOUNTER — LAB VISIT (OUTPATIENT)
Dept: LAB | Facility: HOSPITAL | Age: 69
End: 2024-06-19
Attending: INTERNAL MEDICINE
Payer: MEDICARE

## 2024-06-19 ENCOUNTER — OFFICE VISIT (OUTPATIENT)
Dept: RHEUMATOLOGY | Facility: CLINIC | Age: 69
End: 2024-06-19
Payer: MEDICARE

## 2024-06-19 VITALS
HEIGHT: 59 IN | TEMPERATURE: 98 F | RESPIRATION RATE: 18 BRPM | HEART RATE: 91 BPM | BODY MASS INDEX: 23.69 KG/M2 | OXYGEN SATURATION: 98 % | SYSTOLIC BLOOD PRESSURE: 131 MMHG | WEIGHT: 117.5 LBS | DIASTOLIC BLOOD PRESSURE: 81 MMHG

## 2024-06-19 DIAGNOSIS — G89.29 CHRONIC PAIN OF BOTH KNEES: ICD-10-CM

## 2024-06-19 DIAGNOSIS — M25.562 CHRONIC PAIN OF BOTH KNEES: ICD-10-CM

## 2024-06-19 DIAGNOSIS — D69.6 THROMBOCYTOPENIA: ICD-10-CM

## 2024-06-19 DIAGNOSIS — B19.20 COMPENSATED HCV CIRRHOSIS: ICD-10-CM

## 2024-06-19 DIAGNOSIS — R76.8 RHEUMATOID FACTOR POSITIVE: ICD-10-CM

## 2024-06-19 DIAGNOSIS — M25.561 CHRONIC PAIN OF BOTH KNEES: ICD-10-CM

## 2024-06-19 DIAGNOSIS — R76.8 POSITIVE ANA (ANTINUCLEAR ANTIBODY): ICD-10-CM

## 2024-06-19 DIAGNOSIS — K74.69 COMPENSATED HCV CIRRHOSIS: ICD-10-CM

## 2024-06-19 DIAGNOSIS — Z11.3 SCREEN FOR STD (SEXUALLY TRANSMITTED DISEASE): ICD-10-CM

## 2024-06-19 DIAGNOSIS — B18.2 CHRONIC HEPATITIS C WITHOUT HEPATIC COMA: ICD-10-CM

## 2024-06-19 DIAGNOSIS — M79.641 BILATERAL HAND PAIN: ICD-10-CM

## 2024-06-19 DIAGNOSIS — D70.9 NEUTROPENIA, UNSPECIFIED TYPE: ICD-10-CM

## 2024-06-19 DIAGNOSIS — I10 HYPERTENSION, UNSPECIFIED TYPE: ICD-10-CM

## 2024-06-19 DIAGNOSIS — I10 PRIMARY HYPERTENSION: ICD-10-CM

## 2024-06-19 DIAGNOSIS — M15.9 PRIMARY OSTEOARTHRITIS INVOLVING MULTIPLE JOINTS: ICD-10-CM

## 2024-06-19 DIAGNOSIS — M79.671 PAIN IN BOTH FEET: ICD-10-CM

## 2024-06-19 DIAGNOSIS — E55.9 VITAMIN D DEFICIENCY: ICD-10-CM

## 2024-06-19 DIAGNOSIS — R76.8 POSITIVE ANA (ANTINUCLEAR ANTIBODY): Primary | ICD-10-CM

## 2024-06-19 DIAGNOSIS — M79.642 BILATERAL HAND PAIN: ICD-10-CM

## 2024-06-19 DIAGNOSIS — M79.672 PAIN IN BOTH FEET: ICD-10-CM

## 2024-06-19 LAB
25(OH)D3+25(OH)D2 SERPL-MCNC: 41 NG/ML (ref 30–80)
ALBUMIN SERPL-MCNC: 3.2 G/DL (ref 3.4–4.8)
ALBUMIN/GLOB SERPL: 0.7 RATIO (ref 1.1–2)
ALP SERPL-CCNC: 148 UNIT/L (ref 40–150)
ALT SERPL-CCNC: 19 UNIT/L (ref 0–55)
ANION GAP SERPL CALC-SCNC: 9 MEQ/L
AST SERPL-CCNC: 36 UNIT/L (ref 5–34)
BACTERIA #/AREA URNS AUTO: ABNORMAL /HPF
BASOPHILS # BLD AUTO: 0.02 X10(3)/MCL
BASOPHILS NFR BLD AUTO: 0.7 %
BILIRUB SERPL-MCNC: 0.4 MG/DL
BILIRUB UR QL STRIP.AUTO: NEGATIVE
BUN SERPL-MCNC: 11.5 MG/DL (ref 9.8–20.1)
C3 SERPL-MCNC: 160 MG/DL (ref 80–173)
C4 SERPL-MCNC: 31 MG/DL (ref 13–46)
CALCIUM SERPL-MCNC: 9.7 MG/DL (ref 8.4–10.2)
CHLORIDE SERPL-SCNC: 106 MMOL/L (ref 98–107)
CHOLEST SERPL-MCNC: 144 MG/DL
CHOLEST/HDLC SERPL: 3 {RATIO} (ref 0–5)
CLARITY UR: CLEAR
CO2 SERPL-SCNC: 26 MMOL/L (ref 23–31)
COLOR UR AUTO: YELLOW
CREAT SERPL-MCNC: 0.69 MG/DL (ref 0.55–1.02)
CREAT UR-MCNC: 175.9 MG/DL (ref 45–106)
CREAT/UREA NIT SERPL: 17
CRP SERPL-MCNC: 8.2 MG/L
EOSINOPHIL # BLD AUTO: 0.11 X10(3)/MCL (ref 0–0.9)
EOSINOPHIL NFR BLD AUTO: 4 %
ERYTHROCYTE [DISTWIDTH] IN BLOOD BY AUTOMATED COUNT: 14.9 % (ref 11.5–17)
ERYTHROCYTE [SEDIMENTATION RATE] IN BLOOD: 29 MM/HR (ref 0–20)
GFR SERPLBLD CREATININE-BSD FMLA CKD-EPI: >60 ML/MIN/1.73/M2
GLOBULIN SER-MCNC: 4.7 GM/DL (ref 2.4–3.5)
GLUCOSE SERPL-MCNC: 100 MG/DL (ref 82–115)
GLUCOSE UR QL STRIP: NORMAL
HAV IGM SERPL QL IA: NONREACTIVE
HBV CORE IGM SERPL QL IA: NONREACTIVE
HBV SURFACE AG SERPL QL IA: NONREACTIVE
HCT VFR BLD AUTO: 38.6 % (ref 37–47)
HCV AB SERPL QL IA: REACTIVE
HDLC SERPL-MCNC: 47 MG/DL (ref 35–60)
HGB BLD-MCNC: 12.5 G/DL (ref 12–16)
HGB UR QL STRIP: NEGATIVE
HYALINE CASTS #/AREA URNS LPF: ABNORMAL /LPF
IMM GRANULOCYTES # BLD AUTO: 0.01 X10(3)/MCL (ref 0–0.04)
IMM GRANULOCYTES NFR BLD AUTO: 0.4 %
KETONES UR QL STRIP: NEGATIVE
LDLC SERPL CALC-MCNC: 81 MG/DL (ref 50–140)
LEUKOCYTE ESTERASE UR QL STRIP: NEGATIVE
LYMPHOCYTES # BLD AUTO: 1.06 X10(3)/MCL (ref 0.6–4.6)
LYMPHOCYTES NFR BLD AUTO: 38.1 %
MCH RBC QN AUTO: 29.6 PG (ref 27–31)
MCHC RBC AUTO-ENTMCNC: 32.4 G/DL (ref 33–36)
MCV RBC AUTO: 91.3 FL (ref 80–94)
MONOCYTES # BLD AUTO: 0.31 X10(3)/MCL (ref 0.1–1.3)
MONOCYTES NFR BLD AUTO: 11.2 %
MUCOUS THREADS URNS QL MICRO: ABNORMAL /LPF
NEUTROPHILS # BLD AUTO: 1.27 X10(3)/MCL (ref 2.1–9.2)
NEUTROPHILS NFR BLD AUTO: 45.6 %
NITRITE UR QL STRIP: NEGATIVE
NRBC BLD AUTO-RTO: 0 %
PH UR STRIP: 6 [PH]
PLATELET # BLD AUTO: 128 X10(3)/MCL (ref 130–400)
PMV BLD AUTO: 9.9 FL (ref 7.4–10.4)
POTASSIUM SERPL-SCNC: 3.8 MMOL/L (ref 3.5–5.1)
PROT SERPL-MCNC: 7.9 GM/DL (ref 5.8–7.6)
PROT UR QL STRIP: ABNORMAL
PROT UR STRIP-MCNC: 15.3 MG/DL
RBC # BLD AUTO: 4.23 X10(6)/MCL (ref 4.2–5.4)
RBC #/AREA URNS AUTO: ABNORMAL /HPF
SODIUM SERPL-SCNC: 141 MMOL/L (ref 136–145)
SP GR UR STRIP.AUTO: 1.02 (ref 1–1.03)
SQUAMOUS #/AREA URNS LPF: ABNORMAL /HPF
T PALLIDUM AB SER QL: NONREACTIVE
TRIGL SERPL-MCNC: 78 MG/DL (ref 37–140)
URINE PROTEIN/CREATININE RATIO (OLG): 0.1
UROBILINOGEN UR STRIP-ACNC: ABNORMAL
VLDLC SERPL CALC-MCNC: 16 MG/DL
WBC # BLD AUTO: 2.78 X10(3)/MCL (ref 4.5–11.5)
WBC #/AREA URNS AUTO: ABNORMAL /HPF

## 2024-06-19 PROCEDURE — 4010F ACE/ARB THERAPY RXD/TAKEN: CPT | Mod: CPTII,,, | Performed by: INTERNAL MEDICINE

## 2024-06-19 PROCEDURE — 3079F DIAST BP 80-89 MM HG: CPT | Mod: CPTII,,, | Performed by: INTERNAL MEDICINE

## 2024-06-19 PROCEDURE — 84156 ASSAY OF PROTEIN URINE: CPT

## 2024-06-19 PROCEDURE — 86235 NUCLEAR ANTIGEN ANTIBODY: CPT | Mod: 59

## 2024-06-19 PROCEDURE — 86235 NUCLEAR ANTIGEN ANTIBODY: CPT

## 2024-06-19 PROCEDURE — 86800 THYROGLOBULIN ANTIBODY: CPT

## 2024-06-19 PROCEDURE — 82306 VITAMIN D 25 HYDROXY: CPT

## 2024-06-19 PROCEDURE — 86160 COMPLEMENT ANTIGEN: CPT | Mod: 59

## 2024-06-19 PROCEDURE — 1101F PT FALLS ASSESS-DOCD LE1/YR: CPT | Mod: CPTII,,, | Performed by: INTERNAL MEDICINE

## 2024-06-19 PROCEDURE — 99213 OFFICE O/P EST LOW 20 MIN: CPT | Mod: PBBFAC | Performed by: INTERNAL MEDICINE

## 2024-06-19 PROCEDURE — 86140 C-REACTIVE PROTEIN: CPT

## 2024-06-19 PROCEDURE — 87522 HEPATITIS C REVRS TRNSCRPJ: CPT

## 2024-06-19 PROCEDURE — 36415 COLL VENOUS BLD VENIPUNCTURE: CPT

## 2024-06-19 PROCEDURE — 81001 URINALYSIS AUTO W/SCOPE: CPT

## 2024-06-19 PROCEDURE — 86376 MICROSOMAL ANTIBODY EACH: CPT

## 2024-06-19 PROCEDURE — 80074 ACUTE HEPATITIS PANEL: CPT

## 2024-06-19 PROCEDURE — 3008F BODY MASS INDEX DOCD: CPT | Mod: CPTII,,, | Performed by: INTERNAL MEDICINE

## 2024-06-19 PROCEDURE — 1126F AMNT PAIN NOTED NONE PRSNT: CPT | Mod: CPTII,,, | Performed by: INTERNAL MEDICINE

## 2024-06-19 PROCEDURE — 80053 COMPREHEN METABOLIC PANEL: CPT

## 2024-06-19 PROCEDURE — 80061 LIPID PANEL: CPT

## 2024-06-19 PROCEDURE — 86160 COMPLEMENT ANTIGEN: CPT

## 2024-06-19 PROCEDURE — 86225 DNA ANTIBODY NATIVE: CPT

## 2024-06-19 PROCEDURE — 85652 RBC SED RATE AUTOMATED: CPT

## 2024-06-19 PROCEDURE — 86780 TREPONEMA PALLIDUM: CPT

## 2024-06-19 PROCEDURE — 99214 OFFICE O/P EST MOD 30 MIN: CPT | Mod: S$PBB,,, | Performed by: INTERNAL MEDICINE

## 2024-06-19 PROCEDURE — 85025 COMPLETE CBC W/AUTO DIFF WBC: CPT

## 2024-06-19 PROCEDURE — 3075F SYST BP GE 130 - 139MM HG: CPT | Mod: CPTII,,, | Performed by: INTERNAL MEDICINE

## 2024-06-19 PROCEDURE — 3288F FALL RISK ASSESSMENT DOCD: CPT | Mod: CPTII,,, | Performed by: INTERNAL MEDICINE

## 2024-06-19 RX ORDER — DICLOFENAC SODIUM 10 MG/G
GEL TOPICAL 3 TIMES DAILY
Qty: 90 G | Refills: 11 | Status: SHIPPED | OUTPATIENT
Start: 2024-06-19

## 2024-06-19 NOTE — PROGRESS NOTES
Patient ID: [id number]     Chief Complaint: No chief complaint on file.      HPI:     Paula Vilchis is a 68 y.o. female here today for follow up of positive COOKIE and RF.      C/o pain in hands, shoulders, lower back, legs, ankles and knees. Pain in right leg and palms of hands. Pain started approximately around 2018. Admits pain and swelling in hands, 2 days back she had swelling in hands and left ankle. Morning stiffness for 15 min or less. She used to work as nurse in the past, lift patients. X-ray of feet showed DJD and she used to wear high heels a lot in the past. She is planning to see podiatry.  History of hepatitis-C treated with Epclusa achieved SVR12 in 2020. Hep C qunt not detected in 2021.  She was recently diagnosed with hernia, scheduled to see surgery in [place] at end of this month.   Stuttering when she talk, per patient it runs in family, her sister has it too.     Denies history of fevers, rashes, photosensitivity, oral or nasal ulcers, h/o MI, stroke, seizures, h/o PE or DVT, Raynaud's phenomenon, uveitis, malignancies.   Family history of autoimmune disease: none.  Pregnancies: 4  Miscarriages: none.  Smoking: Smokes black N mild cigar daily- 3 of them and marijuana.      Social History     Tobacco Use   Smoking Status Every Day    Types: Cigars    Start date: 1978   Smokeless Tobacco Never   Tobacco Comments    4 Black n Mild cigar a day        -------------------------------------    Compensated HCV cirrhosis    Depression    HTN (hypertension)    Hx of colonic polyp    Situational depression    Thrombocytopenia, unspecified    Tobacco user        Past Surgical History:   Procedure Laterality Date     SECTION       SECTION       SECTION       SECTION      Drainage of Foot Skin, External Approach Right 2017    Draingage of Foot Skin, External Approach Right 2017    HERNIA REPAIR      Incision and Drainage of Abscess;  complicated or multiple N/A 12/08/2017    Incision and Drainage of Hematoma, seroma or fluid collection N/A 11/28/2017       Review of patient's allergies indicates:  No Known Allergies    Current Outpatient Medications   Medication Instructions    baclofen (LIORESAL) 10 mg, Oral, 3 times daily    betamethasone valerate 0.1% (VALISONE) 0.1 % Crea Topical (Top), 2 times daily    cholecalciferol (vitamin D3) (VITAMIN D3) 2,000 Units, Oral, Daily    diclofenac sodium (VOLTAREN) 1 % Gel Topical (Top), 3 times daily    dicyclomine (BENTYL) 20 mg, Oral, 3 times daily PRN    docusate sodium (STOOL SOFTENER) 100 mg, Oral, Daily    FLUoxetine 40 mg, Daily    HYDROcodone-acetaminophen (NORCO) 5-325 mg per tablet 1 tablet, Oral, Every 6 hours PRN    hydrOXYzine pamoate (VISTARIL) 25 MG Cap TAKE ONE CAPSULE BY MOUTH 4 TIMES A DAY AS NEEDED FOR ANXIETY    indomethacin (INDOCIN) 50 mg, Oral, 3 times daily with meals    lactulose (CHRONULAC) 20 g, Oral, 3 times daily PRN    losartan (COZAAR) 25 mg, Oral, Daily    multivitamin (THERAGRAN) per tablet 1 tablet, Oral, Daily    ondansetron (ZOFRAN-ODT) 4 mg, Oral, Every 6 hours PRN    pantoprazole (PROTONIX) 40 MG tablet TAKE ONE TABLET BY MOUTH ONCE DAILY FOR THE STOMACH.       Social History     Socioeconomic History    Marital status: Single    Number of children: 4   Occupational History    Occupation: Unemployed   Tobacco Use    Smoking status: Every Day     Types: Cigars     Start date: 6/1/1978    Smokeless tobacco: Never    Tobacco comments:     4 Black n Mild cigar a day   Substance and Sexual Activity    Alcohol use: Yes     Alcohol/week: 3.0 standard drinks of alcohol     Types: 3 Glasses of wine per week     Comment: Wine Occasionally    Drug use: Yes     Frequency: 4.0 times per week     Types: Marijuana    Sexual activity: Yes     Birth control/protection: Post-menopausal     Social Determinants of Health     Financial Resource Strain: High Risk (8/21/2023)    Overall  Financial Resource Strain (CARDIA)     Difficulty of Paying Living Expenses: Hard   Food Insecurity: Food Insecurity Present (8/21/2023)    Hunger Vital Sign     Worried About Running Out of Food in the Last Year: Often true     Ran Out of Food in the Last Year: Sometimes true   Transportation Needs: Unmet Transportation Needs (8/21/2023)    PRAPARE - Transportation     Lack of Transportation (Medical): Yes     Lack of Transportation (Non-Medical): Yes   Physical Activity: Inactive (8/21/2023)    Exercise Vital Sign     Days of Exercise per Week: 0 days     Minutes of Exercise per Session: 0 min   Stress: Stress Concern Present (8/21/2023)    [employer] [place] - Occupational Stress Questionnaire     Feeling of Stress : Very much   Housing Stability: High Risk (8/21/2023)    Housing Stability Vital Sign     Unable to Pay for Housing in the Last Year: Yes     Number of Places Lived in the Last Year: 1     Unstable Housing in the Last Year: No        Family History   Problem Relation Name Age of Onset    Diabetes Father      Hypertension Father      Cancer Mother      Hypertension Mother      Stroke Paternal Uncle          Immunization History   Administered Date(s) Administered    COVID-19, MRNA, LN-S, PF (Pfizer) (Gray Cap) 07/02/2022    COVID-19, MRNA, LN-S, PF (Pfizer) (Purple Cap) 01/13/2022    COVID-19, mRNA, LNP-S, bivalent booster, PF (PFIZER OMICRON) 01/16/2023    Hepatitis A, Adult 10/25/2018    Hepatitis B, Adult 04/18/2019, 05/28/2019, 10/29/2019    Influenza (FLUAD) - Quadrivalent - Adjuvanted - PF *Preferred* (65+) 10/18/2023    Influenza - Quadrivalent - PF *Preferred* (6 months and older) 10/29/2019    Pneumococcal Conjugate - 20 Valent 08/21/2023    Tdap 02/07/2020       Patient Care Team:  Nancy Rebolledo FNP as PCP - General (Nurse Practitioner)  Ivett Lara FNP (Family Medicine)     Subjective:     ROS    Constitutional:  Denies chills. Denies fever. Denies night  "sweats. Denies weight loss.   Ophthalmology: Denies blurred vision. Denies dry eyes. Denies eye pain. Denies Itching and redness.   ENT: Denies oral ulcers. Denies epistaxis. Denies dry mouth. Denies swollen glands.   Endocrine: Denies diabetes. Denies thyroid Problems.   Respiratory: Denies cough. Denies shortness of breath. Denies shortness of breath with exertion. Denies hemoptysis.   Cardiovascular: Denies chest pain at rest. Denies chest pain with exertion. Denies palpitations.    Gastrointestinal: Admits abdominal pain. Denies diarrhea. Admits nausea.   Genitourinary: Denies blood in urine.  Musculoskeletal: See HPI for details  Integumentary:   Denies photosensitivity.   Peripheral Vascular: Denies Ulcers of hands and/or feet. Denies Cold extremities.   Neurologic: Denies dizziness. Denies headache.  Denies loss of strength. Admits numbness or tingling.   Psychiatric: Admits depression. Admits anxiety. Denies suicidal/homicidal ideations.      Objective:     Visit Vitals  /81 (BP Location: Left arm, Patient Position: Sitting, BP Method: Medium (Automatic))   Pulse 91   Temp 98.4 °F (36.9 °C) (Oral)   Resp 18   Ht 4' 11" (1.499 m)   Wt 53.3 kg (117 lb 8.1 oz)   SpO2 98%   BMI 23.73 kg/m²       Physical Exam    General Appearance: alert, pleasant, in no acute distress.  Skin: Skin color, texture, turgor normal. No rashes or lesions.  Eyes:  extraocular movement intact (EOMI), pupils equal, round, reactive to light and accommodation, conjunctiva clear.  ENT: No oral or nasal ulcers.  Neck:  Neck supple. No adenopathy.   Lungs: CTA bilaterally without crackles, rhonchi, or wheezes.   Heart: RRR w/o murmurs.  No edema. 2+ DP/TP pulse.  Abdomen: Soft, non-tender, no masses, rebound or guarding.  Neuro: Alert, oriented, CN II-XII GI, sensory and motor innervation intact.  Musculoskeletal: Bilateral hand deformity with degenerative changes, Heberden's nodules noted with bilateral CMC squaring, no active " synovitis noted to bilateral MCPs. No pain to bilateral MTPs. No swelling or pain to bilateral knees, FROM noted. No swelling or pain noted to bilateral ankles. Good ROM of shoulders and no tenderness with ROM.  Psych: Alert, oriented, normal eye contact.      Labs Reviewed:     3/2019: COOKIE 1:1280, nucleolar and speckled pattern.   5/2020: . RF IgA 8, IgM21, IgG negative.     02/01/2023: CCP positive. WBC count 2.5, ANC 0.8. Platelets 94.  ESR  25. Alk phos 190, AST 53. CRP normal. Vit D 34. C3, C4 normal. No blood and protein in urine.  mg/g. C3/C4 ok. RF IgA 64, IgM 36, IgG negative.     6/5/24: CMP ok. WBC 3.24, ANC 1.69. Platelet count 83.     2/1/23: Hallux valgus on left and hammer toe with DJD, no eorsions. DJD of right foot. DJD of bilateral hands, no erosions. Normal X-ray of bilateral knees.       Assessment:       ICD-10-CM ICD-9-CM   1. Positive COOKIE (antinuclear antibody)  R76.8 795.79   2. Rheumatoid factor positive  R76.8 795.79   3. Primary osteoarthritis involving multiple joints  M15.9 715.98   4. Bilateral hand pain  M79.641 729.5    M79.642    5. Pain in both feet  M79.671 729.5    M79.672    6. Primary hypertension  I10 401.9   7. Chronic pain of both knees  M25.561 719.46    M25.562 338.29    G89.29    8. Chronic hepatitis C without hepatic coma  B18.2 070.54   9. Compensated HCV cirrhosis  K74.69 571.5    B19.20 070.54   10. Neutropenia, unspecified type  D70.9 288.00   11. Thrombocytopenia  D69.6 287.5          Plan:   1. Bilateral hand pain:   -Bilateral hand deformity with degenerative changes, Heberden's nodules noted with bilateral CMC squaring, no active synovitis noted to bilateral MCPs. Hand pain secondary to osteoarthritis. Discussed disease course and symptomatic management with patient.   -+ RF and CCP. Positive serologies possibly due to history of Hep C along with history of tobacco use.  No synovitis on exam.   -Voltaren 2% topically to areas 2-3 times a day prn    -Heat to area along with stretches and exercises. Also advised to wear wrist splints with thumb spica for CMC joint arthritis.     2. Hypertension, unspecified type  -Followed by PCP, stable    3. Chronic pain of both knees,   form DJD, normal exam. No synovitis.     4. Chronic hepatitis C without hepatic coma and liver cirrhosis  -Followed by GI, Dr Segovia. History of hepatitis-C treated with Epclusa achieved SVR12 in September of 2020. Hep C qunt not detected in 9/2021.    5. Gastroesophageal reflux disease  -Continue meds as directed along with diet modifications  - abdominal pain form hernia, scheduled to see surgery.     6. Neutropenia and thrombocytopenia. secondary to cirrhosis. Stable.   -Lab monitoring    7. Positive COOKIE   Positive COOKIE, in the past, related to Hep C infection.  Will check ROWAN's to complete work up.   - Follow up once a year for positive COOKIE.     Follow up in about 1 year (around 6/19/2025) for with me. In addition to their scheduled follow up, the patient has also been instructed to follow up on as needed basis.      Total time spent with patient and documentation is 30 minutes. All questions were answered to patient's satisfaction and patient verbalized understanding.       Colleen Mon MD

## 2024-06-20 LAB
DSDNA IGG SERPL IA-ACNC: <10 IU/ML (ref 0–99)
ENA SCL70 IGG SER IA-ACNC: 1 AU/ML
ENA SS-A IGG SER-ACNC: <0.2 U
ENA SS-B IGG SER-ACNC: <0.2 U
HCV RNA SERPL NAA+PROBE-ACNC: NORMAL IU/ML
THYROGLOB AB SERPL IA-ACNC: <1.8 IU/ML
THYROPEROXIDASE AB SERPL-ACNC: 0.7 IU/ML

## 2024-06-21 LAB
ENA SM IGG SER-ACNC: 2 AU/ML
U1 SNRNP IGG SER-ACNC: 3 UNITS

## 2024-09-09 ENCOUNTER — HOSPITAL ENCOUNTER (EMERGENCY)
Facility: HOSPITAL | Age: 69
Discharge: HOME | End: 2024-09-09
Attending: INTERNAL MEDICINE
Payer: MEDICARE

## 2024-09-09 VITALS
BODY MASS INDEX: 22.67 KG/M2 | OXYGEN SATURATION: 99 % | TEMPERATURE: 98 F | SYSTOLIC BLOOD PRESSURE: 158 MMHG | DIASTOLIC BLOOD PRESSURE: 88 MMHG | WEIGHT: 112.44 LBS | HEIGHT: 59 IN | HEART RATE: 76 BPM | RESPIRATION RATE: 20 BRPM

## 2024-09-09 DIAGNOSIS — M25.511 RIGHT SHOULDER PAIN: ICD-10-CM

## 2024-09-09 DIAGNOSIS — M19.90 ARTHRITIS: Primary | ICD-10-CM

## 2024-09-09 DIAGNOSIS — R07.9 CHEST PAIN: ICD-10-CM

## 2024-09-09 LAB — TROPONIN I SERPL-MCNC: <0.01 NG/ML (ref 0–0.04)

## 2024-09-09 PROCEDURE — 96372 THER/PROPH/DIAG INJ SC/IM: CPT | Performed by: PHYSICIAN ASSISTANT

## 2024-09-09 PROCEDURE — 84484 ASSAY OF TROPONIN QUANT: CPT | Performed by: PHYSICIAN ASSISTANT

## 2024-09-09 PROCEDURE — 93010 ELECTROCARDIOGRAM REPORT: CPT | Mod: ,,, | Performed by: INTERNAL MEDICINE

## 2024-09-09 PROCEDURE — 99285 EMERGENCY DEPT VISIT HI MDM: CPT | Mod: 25

## 2024-09-09 PROCEDURE — 93005 ELECTROCARDIOGRAM TRACING: CPT

## 2024-09-09 PROCEDURE — 63600175 PHARM REV CODE 636 W HCPCS: Performed by: PHYSICIAN ASSISTANT

## 2024-09-09 PROCEDURE — 25000003 PHARM REV CODE 250: Performed by: PHYSICIAN ASSISTANT

## 2024-09-09 RX ORDER — TRAMADOL HYDROCHLORIDE 50 MG/1
50 TABLET ORAL EVERY 6 HOURS PRN
Qty: 12 TABLET | Refills: 0 | Status: SHIPPED | OUTPATIENT
Start: 2024-09-09

## 2024-09-09 RX ORDER — DICLOFENAC SODIUM 50 MG/1
50 TABLET, DELAYED RELEASE ORAL 2 TIMES DAILY
Qty: 28 TABLET | Refills: 0 | Status: SHIPPED | OUTPATIENT
Start: 2024-09-09 | End: 2024-09-23

## 2024-09-09 RX ORDER — DICLOFENAC SODIUM 10 MG/G
2 GEL TOPICAL 4 TIMES DAILY
Qty: 100 G | Refills: 0 | Status: SHIPPED | OUTPATIENT
Start: 2024-09-09 | End: 2024-09-19

## 2024-09-09 RX ORDER — KETOROLAC TROMETHAMINE 30 MG/ML
30 INJECTION, SOLUTION INTRAMUSCULAR; INTRAVENOUS
Status: COMPLETED | OUTPATIENT
Start: 2024-09-09 | End: 2024-09-09

## 2024-09-09 RX ORDER — HYDROCODONE BITARTRATE AND ACETAMINOPHEN 10; 325 MG/1; MG/1
1 TABLET ORAL
Status: COMPLETED | OUTPATIENT
Start: 2024-09-09 | End: 2024-09-09

## 2024-09-09 RX ADMIN — KETOROLAC TROMETHAMINE 30 MG: 30 INJECTION, SOLUTION INTRAMUSCULAR at 09:09

## 2024-09-09 RX ADMIN — HYDROCODONE BITARTRATE AND ACETAMINOPHEN 1 TABLET: 10; 325 TABLET ORAL at 10:09

## 2024-09-10 LAB
OHS QRS DURATION: 72 MS
OHS QTC CALCULATION: 429 MS

## 2024-09-10 NOTE — ED PROVIDER NOTES
Encounter Date: 2024       History     Chief Complaint   Patient presents with    Shoulder Pain     C/o atraumatic RUE pain x 2 days. No obvious deformity/edema noted. Denies injury.     68-year-old  female with history of hypertension and cirrhosis presents to the emergency room with atraumatic right shoulder and right wrist pain.  Patient denies numbness or tingling.  Patient denies any radiating symptoms into the jaw or chest.  Patient says she does have some chest pain with movement as well as movement of the upper extremity.  Patient also has history of carpal tunnel right wrist.  Patient has taken Tylenol and ibuprofen with incomplete relief.  Patient denies nausea or vomiting.  Patient denies chest pain, or difficulty breathing.    The history is provided by the patient. No  was used.     Review of patient's allergies indicates:  No Known Allergies  Past Medical History:   Diagnosis Date    Compensated HCV cirrhosis     Depression     HTN (hypertension)     Hx of colonic polyp     Situational depression     Thrombocytopenia, unspecified     Tobacco user      Past Surgical History:   Procedure Laterality Date     SECTION       SECTION       SECTION       SECTION      Drainage of Foot Skin, External Approach Right 2017    Draingage of Foot Skin, External Approach Right 2017    HERNIA REPAIR      Incision and Drainage of Abscess; complicated or multiple N/A 2017    Incision and Drainage of Hematoma, seroma or fluid collection N/A 2017     Family History   Problem Relation Name Age of Onset    Diabetes Father      Hypertension Father      Cancer Mother      Hypertension Mother      Stroke Paternal Uncle       Social History     Tobacco Use    Smoking status: Every Day     Types: Cigars     Start date: 1978    Smokeless tobacco: Never    Tobacco comments:     4 Black n Mild cigar a day   Substance Use Topics     Alcohol use: Yes     Alcohol/week: 3.0 standard drinks of alcohol     Types: 3 Glasses of wine per week     Comment: Wine Occasionally    Drug use: Yes     Frequency: 4.0 times per week     Types: Marijuana     Review of Systems   Constitutional: Negative.    HENT: Negative.     Eyes: Negative.    Respiratory: Negative.     Cardiovascular:  Negative for chest pain.   Gastrointestinal: Negative.    Genitourinary: Negative.    Musculoskeletal:  Positive for joint swelling.   Neurological: Negative.        Physical Exam     Initial Vitals [09/09/24 2111]   BP Pulse Resp Temp SpO2   (!) 157/76 79 18 97.9 °F (36.6 °C) 99 %      MAP       --         Physical Exam    Nursing note and vitals reviewed.  Constitutional: She appears well-developed and well-nourished. No distress.   HENT:   Head: Normocephalic and atraumatic.   Eyes: Conjunctivae, EOM and lids are normal. Pupils are equal, round, and reactive to light.   Neck: Neck supple.   Normal range of motion.  Cardiovascular:  Normal rate and regular rhythm.           Pulmonary/Chest: Effort normal and breath sounds normal.   Abdominal: Abdomen is flat.   Musculoskeletal:        Arms:       Cervical back: Normal range of motion and neck supple.      Comments: Decrease ROM with shoulder flexion, internal rotation and abduction of right shoulder. Reproducible chest wall pain right anterior chest. Tenderness right shoulder blade and anterior right shoulder. Tenderness right wrist- nonspecific.      Neurological: She is alert and oriented to person, place, and time. She has normal strength. She is not disoriented. No cranial nerve deficit or sensory deficit. GCS eye subscore is 4. GCS verbal subscore is 5. GCS motor subscore is 6.   Skin: Skin is warm and intact.   Psychiatric: She has a normal mood and affect. Her speech is normal and behavior is normal. Judgment and thought content normal. Cognition and memory are normal.         ED Course   Procedures  Labs Reviewed    TROPONIN I - Normal       Result Value    Troponin-I <0.010       EKG Readings: (Independently Interpreted)   Initial Reading: No STEMI. Previous EKG: Compared with most recent EKG Rhythm: Normal Sinus Rhythm. Heart Rate: 70. Ectopy: No Ectopy.     ECG Results              EKG 12-lead (In process)        Collection Time Result Time QRS Duration OHS QTC Calculation    09/09/24 21:34:02 09/09/24 22:01:53 72 429                     In process by Interface, Lab In [facility] (09/09/24 22:01:59)                   Narrative:    Test Reason : R07.9,    Vent. Rate : 070 BPM     Atrial Rate : 070 BPM     P-R Int : 138 ms          QRS Dur : 072 ms      QT Int : 398 ms       P-R-T Axes : 018 039 049 degrees     QTc Int : 429 ms    Normal sinus rhythm  Nonspecific T wave abnormality  Abnormal ECG  No previous ECGs available    Referred By: [email or web address]   SELF           Confirmed By:                                   Imaging Results              X-Ray Shoulder Complete 2 View Right (In process)                   X-Rays:   Independently Interpreted Readings:   Other Readings:  arthritis    Medications   ketorolac injection 30 mg (30 mg Intramuscular Given 9/9/24 2156)     Medical Decision Making  68-year-old  female with history of hypertension and cirrhosis presents to the emergency room with atraumatic right shoulder and right wrist pain.  Patient denies numbness or tingling.  Patient denies any radiating symptoms into the jaw or chest.  Patient says she does have some chest pain with movement as well as movement of the upper extremity.  Patient also has history of carpal tunnel right wrist.  Patient has taken Tylenol and ibuprofen with incomplete relief.  Patient denies nausea or vomiting.  Patient denies chest pain, or difficulty breathing.    Problems Addressed:  Right shoulder pain: acute illness or injury     Details: Differential diagnosis included but not limited to:  Arthritis, osteoporosis, osteoarthritis,  myocardial infarction    Maybe an atypical MI presentation.  She does not have any radiating symptoms to the jaw or left shoulder blade.  No nausea or vomiting.  No episodes like this before.  There is her cane that is making when fallen a few days so possible barometric changes could be contributing to the arthritis.  Because now her right ear wrist is also hurting.  Patient has a history of carpal tunnel.    Amount and/or Complexity of Data Reviewed  Radiology: ordered.    Risk  Prescription drug management.                                      Clinical Impression:  Final diagnoses:  [R07.9] Chest pain  [M25.511] Right shoulder pain  [M19.90] Arthritis (Primary)          ED Disposition Condition    Discharge Stable          ED Prescriptions       Medication Sig Dispense Start Date End Date Auth. Provider    diclofenac sodium (VOLTAREN) 1 % Gel Apply 2 g topically 4 (four) times daily. for 10 days 100 g 9/9/2024 9/19/2024 Marta Jama PA    traMADoL (ULTRAM) 50 mg tablet Take 1 tablet (50 mg total) by mouth every 6 (six) hours as needed for Pain. 12 tablet 9/9/2024 -- Marta Jama PA    diclofenac (VOLTAREN) 50 MG EC tablet Take 1 tablet (50 mg total) by mouth 2 (two) times daily. for 14 days 28 tablet 9/9/2024 9/23/2024 Marta Jama PA          Follow-up Information       Follow up With Specialties Details Why Contact Info    Nancy Rebolledo, FNP Family Medicine Schedule an appointment as soon as possible for a visit   [address] [facility] [address] [phone]      [facility] - Emergency Dept Emergency Medicine  As needed, If symptoms worsen [address]  [facility] [id number] [phone]        This note was typed partially using voice recognition software.  Please be reminded that not all corrections/addendums to grammar may have been made prior to closing of this chart.       Marta Jama PA  09/09/24 8025

## 2024-11-05 DIAGNOSIS — M19.011 PRIMARY OSTEOARTHRITIS OF RIGHT SHOULDER: Primary | ICD-10-CM

## 2024-11-05 RX ORDER — MELOXICAM 7.5 MG/1
TABLET ORAL
Qty: 14 TABLET | Refills: 0 | Status: SHIPPED | OUTPATIENT
Start: 2024-11-05

## 2024-11-27 DIAGNOSIS — M19.011 PRIMARY OSTEOARTHRITIS OF RIGHT SHOULDER: ICD-10-CM

## 2024-11-27 RX ORDER — MELOXICAM 7.5 MG/1
TABLET ORAL
Qty: 14 TABLET | Refills: 0 | Status: SHIPPED | OUTPATIENT
Start: 2024-11-27

## 2025-03-06 ENCOUNTER — LAB VISIT (OUTPATIENT)
Dept: LAB | Facility: HOSPITAL | Age: 70
End: 2025-03-06
Attending: STUDENT IN AN ORGANIZED HEALTH CARE EDUCATION/TRAINING PROGRAM
Payer: MEDICARE

## 2025-03-06 DIAGNOSIS — D69.6 THROMBOCYTOPENIA, UNSPECIFIED: Primary | ICD-10-CM

## 2025-03-06 LAB
BASOPHILS # BLD AUTO: 0.03 X10(3)/MCL
BASOPHILS NFR BLD AUTO: 1 %
EOSINOPHIL # BLD AUTO: 0.25 X10(3)/MCL (ref 0–0.9)
EOSINOPHIL NFR BLD AUTO: 8.5 %
ERYTHROCYTE [DISTWIDTH] IN BLOOD BY AUTOMATED COUNT: 16.4 % (ref 11.5–17)
HCT VFR BLD AUTO: 35.1 % (ref 37–47)
HGB BLD-MCNC: 11.4 G/DL (ref 12–16)
IMM GRANULOCYTES # BLD AUTO: 0 X10(3)/MCL (ref 0–0.04)
IMM GRANULOCYTES NFR BLD AUTO: 0 %
LYMPHOCYTES # BLD AUTO: 1.21 X10(3)/MCL (ref 0.6–4.6)
LYMPHOCYTES NFR BLD AUTO: 41.2 %
MCH RBC QN AUTO: 28.4 PG (ref 27–31)
MCHC RBC AUTO-ENTMCNC: 32.5 G/DL (ref 33–36)
MCV RBC AUTO: 87.3 FL (ref 80–94)
MONOCYTES # BLD AUTO: 0.41 X10(3)/MCL (ref 0.1–1.3)
MONOCYTES NFR BLD AUTO: 13.9 %
NEUTROPHILS # BLD AUTO: 1.04 X10(3)/MCL (ref 2.1–9.2)
NEUTROPHILS NFR BLD AUTO: 35.4 %
NRBC BLD AUTO-RTO: 0 %
PLATELET # BLD AUTO: 89 X10(3)/MCL (ref 130–400)
PMV BLD AUTO: 11.2 FL (ref 7.4–10.4)
RBC # BLD AUTO: 4.02 X10(6)/MCL (ref 4.2–5.4)
WBC # BLD AUTO: 2.94 X10(3)/MCL (ref 4.5–11.5)

## 2025-03-06 PROCEDURE — 85025 COMPLETE CBC W/AUTO DIFF WBC: CPT

## 2025-03-06 PROCEDURE — 36415 COLL VENOUS BLD VENIPUNCTURE: CPT

## 2025-03-13 ENCOUNTER — LAB VISIT (OUTPATIENT)
Dept: LAB | Facility: HOSPITAL | Age: 70
End: 2025-03-13
Attending: STUDENT IN AN ORGANIZED HEALTH CARE EDUCATION/TRAINING PROGRAM
Payer: MEDICARE

## 2025-03-13 DIAGNOSIS — D69.6 THROMBOCYTOPENIA, UNSPECIFIED: Primary | ICD-10-CM

## 2025-03-13 LAB
BASOPHILS # BLD AUTO: 0.03 X10(3)/MCL
BASOPHILS NFR BLD AUTO: 1.1 %
EOSINOPHIL # BLD AUTO: 0.31 X10(3)/MCL (ref 0–0.9)
EOSINOPHIL NFR BLD AUTO: 11.6 %
ERYTHROCYTE [DISTWIDTH] IN BLOOD BY AUTOMATED COUNT: 17.1 % (ref 11.5–17)
HCT VFR BLD AUTO: 36.6 % (ref 37–47)
HGB BLD-MCNC: 11.7 G/DL (ref 12–16)
IMM GRANULOCYTES # BLD AUTO: 0 X10(3)/MCL (ref 0–0.04)
IMM GRANULOCYTES NFR BLD AUTO: 0 %
LYMPHOCYTES # BLD AUTO: 0.98 X10(3)/MCL (ref 0.6–4.6)
LYMPHOCYTES NFR BLD AUTO: 36.6 %
MCH RBC QN AUTO: 28.3 PG (ref 27–31)
MCHC RBC AUTO-ENTMCNC: 32 G/DL (ref 33–36)
MCV RBC AUTO: 88.4 FL (ref 80–94)
MONOCYTES # BLD AUTO: 0.45 X10(3)/MCL (ref 0.1–1.3)
MONOCYTES NFR BLD AUTO: 16.8 %
NEUTROPHILS # BLD AUTO: 0.91 X10(3)/MCL (ref 2.1–9.2)
NEUTROPHILS NFR BLD AUTO: 33.9 %
NRBC BLD AUTO-RTO: 0 %
PLATELET # BLD AUTO: 89 X10(3)/MCL (ref 130–400)
PMV BLD AUTO: 10.5 FL (ref 7.4–10.4)
RBC # BLD AUTO: 4.14 X10(6)/MCL (ref 4.2–5.4)
WBC # BLD AUTO: 2.68 X10(3)/MCL (ref 4.5–11.5)

## 2025-03-13 PROCEDURE — 36415 COLL VENOUS BLD VENIPUNCTURE: CPT

## 2025-03-13 PROCEDURE — 85025 COMPLETE CBC W/AUTO DIFF WBC: CPT

## 2025-06-18 ENCOUNTER — PATIENT OUTREACH (OUTPATIENT)
Facility: CLINIC | Age: 70
End: 2025-06-18
Payer: MEDICARE